# Patient Record
Sex: FEMALE | Race: BLACK OR AFRICAN AMERICAN | NOT HISPANIC OR LATINO | Employment: OTHER | URBAN - METROPOLITAN AREA
[De-identification: names, ages, dates, MRNs, and addresses within clinical notes are randomized per-mention and may not be internally consistent; named-entity substitution may affect disease eponyms.]

---

## 2017-03-05 DIAGNOSIS — E78.5 HYPERLIPIDEMIA LDL GOAL <100: ICD-10-CM

## 2017-03-06 NOTE — TELEPHONE ENCOUNTER
simvastatin (ZOCOR) 20 MG tablet 90 tablet 3 12/22/2016          Last Written Prescription Date: 12/22/2016  Last Fill Quantity: 90, # refills: 3  Last Office Visit with [facility], [facility] or [facility] prescribing provider: 12/22/2016       Lab Results   Component Value Date    CHOL 143 07/07/2016     Lab Results   Component Value Date    HDL 51 07/07/2016     Lab Results   Component Value Date    LDL 72 07/07/2016     Lab Results   Component Value Date    TRIG 100 07/07/2016     Lab Results   Component Value Date    CHOLHDLRATIO 2.7 09/22/2015

## 2017-03-07 RX ORDER — SIMVASTATIN 20 MG
TABLET ORAL
Qty: 90 TABLET | Refills: 1 | Status: SHIPPED | OUTPATIENT
Start: 2017-03-07 | End: 2017-11-16

## 2017-03-07 NOTE — TELEPHONE ENCOUNTER
Prescription approved per [facility] [place] [place] Refill Protocol.  Ira Quarles RN, BSN

## 2017-04-21 ENCOUNTER — TELEPHONE (OUTPATIENT)
Dept: FAMILY MEDICINE | Facility: CLINIC | Age: 77
End: 2017-04-21

## 2017-04-21 ENCOUNTER — OFFICE VISIT (OUTPATIENT)
Dept: FAMILY MEDICINE | Facility: CLINIC | Age: 77
End: 2017-04-21
Payer: COMMERCIAL

## 2017-04-21 VITALS
TEMPERATURE: 97.7 F | WEIGHT: 156 LBS | SYSTOLIC BLOOD PRESSURE: 140 MMHG | HEIGHT: 66 IN | DIASTOLIC BLOOD PRESSURE: 80 MMHG | BODY MASS INDEX: 25.07 KG/M2 | OXYGEN SATURATION: 99 % | HEART RATE: 90 BPM

## 2017-04-21 DIAGNOSIS — R07.9 LEFT SIDED CHEST PAIN: Primary | ICD-10-CM

## 2017-04-21 PROCEDURE — 93000 ELECTROCARDIOGRAM COMPLETE: CPT | Performed by: INTERNAL MEDICINE

## 2017-04-21 PROCEDURE — 99213 OFFICE O/P EST LOW 20 MIN: CPT | Performed by: INTERNAL MEDICINE

## 2017-04-21 NOTE — MR AVS SNAPSHOT
"              After Visit Summary   2017    Ashley Liu    MRN: [id number]           Patient Information     Date Of Birth          1940        Visit Information        Provider Department      2017 12:45 PM Julio César Peres MD [facility]        Today's Diagnoses     Left sided chest pain    -  1       Follow-ups after your visit        Who to contact     If you have questions or need follow up information about today's clinic visit or your schedule please contact [facility] directly at [phone].  Normal or non-critical lab and imaging results will be communicated to you by [employer], letter or phone within 4 business days after the clinic has received the results. If you do not hear from us within 7 days, please contact the clinic through [employer] or phone. If you have a critical or abnormal lab result, we will notify you by phone as soon as possible.  Submit refill requests through [employer] | mentor | invest or call your pharmacy and they will forward the refill request to us. Please allow 3 business days for your refill to be completed.          Additional Information About Your Visit        MyChart Information     [employer] lets you send messages to your doctor, view your test results, renew your prescriptions, schedule appointments and more. To sign up, go to [email or web address] | mentor | invest . Click on \"Log in\" on the left side of the screen, which will take you to the Welcome page. Then click on \"Sign up Now\" on the right side of the page.     You will be asked to enter the access code listed below, as well as some personal information. Please follow the directions to create your username and password.     Your access code is: [email or web address]  Expires: 2017  1:14 PM     Your access code will  in 90 days. If you need help or a new code, please call your [facility] or [phone].        Care EveryWhere ID     This is your Care EveryWhere ID. This could be used by other " "organizations to access your [facility] medical records  [id number]        Your Vitals Were     Pulse Temperature Height Pulse Oximetry Breastfeeding? BMI (Body Mass Index)    90 97.7  F (36.5  C) (Oral) 5' 6\" (1.676 m) 99% No 25.18 kg/m2       Blood Pressure from Last 3 Encounters:   04/21/17 140/80   12/22/16 138/68   07/07/16 132/74    Weight from Last 3 Encounters:   04/21/17 156 lb (70.8 kg)   12/22/16 158 lb (71.7 kg)   07/07/16 158 lb (71.7 kg)              We Performed the Following     EKG 12-lead complete w/read - Clinics        Primary Care Provider Office Phone # Fax #    Julio César Peres [phone]       [facility] [address]  [facility] [address]        Thank you!     Thank you for choosing [facility]  for your care. Our goal is always to provide you with excellent care. Hearing back from our patients is one way we can continue to improve our services. Please take a few minutes to complete the written survey that you may receive in the mail after your visit with us. Thank you!             Your Updated Medication List - Protect others around you: Learn how to safely use, store and throw away your medicines at www.disposemymeds.org.          This list is accurate as of: 4/21/17  1:14 PM.  Always use your most recent med list.                   Brand Name Dispense Instructions for use    ALPHAGAN P 0.1 % ophthalmic solution   Generic drug:  brimonidine      Place 1 drop into both eyes 2 times daily.       amLODIPine 5 MG tablet    NORVASC    90 tablet    Take 1 tablet (5 mg) by mouth daily       aspirin 325 MG EC tablet     100 tablet    Take 1 tablet by mouth daily.       blood glucose monitoring lancets     1 Box    1 each daily.       blood glucose monitoring test strip    ACCU-CHEK DEIDRA PLUS    50 each    USE ONE STRIP ONCE DAILY       colchicine 0.6 MG tablet    COLCRYS    20 tablet    Take 2 tablets at the first sign of flare, take 1 " additional tablet one hour later.       irbesartan 300 MG tablet    AVAPRO    90 tablet    Take 1 tablet (300 mg) by mouth At Bedtime       LORazepam 0.5 MG tablet    ATIVAN    30 tablet    Take 1 tablet by mouth daily as needed for anxiety.       MULTIPLE VITAMIN PO      Take  by mouth. 2 childrens multi vitamins daily.       simvastatin 20 MG tablet    ZOCOR    90 tablet    TAKE ONE TABLET BY MOUTH AT BEDTIME       spironolactone 25 MG tablet    ALDACTONE    90 tablet    Take 1 tablet (25 mg) by mouth daily       TRAVATAN 0.004 % ophthalmic solution   Generic drug:  travoprost Z (benzalkonium)      1 drop every evening.       vitamin D 2000 UNITS Caps      Take 2,000 Units by mouth.

## 2017-04-21 NOTE — TELEPHONE ENCOUNTER
Reason for call:  Patient reporting a symptom    Symptom or request: Pain in left breast    Duration (how long have symptoms been present): woke up with pain this morning    Have you been treated for this before? No    Additional comments: patient is wondering what she can do to relieve the pain and if she should be worried.    Phone Number patient can be reached at:  Home number on file [phone] (home)    Best Time:  Any time    Can we leave a detailed message on this number:  YES    Call taken on 4/21/2017 at 10:25 AM by Ly Pulido.

## 2017-04-21 NOTE — TELEPHONE ENCOUNTER
"Call to patient.  She states she has been having left breast pain since waking up this morning.  She notes the pain to go from her nipple to \"almost\" her axillary area on the left side.  She denies any nipple discharge, no apparent breast swelling.  She states pain is not severe, but \"enough to know it is there\" and it feels more like a dull ache.  She denies any other chest symptoms such as SOB, heaviness, or pain.  She denies N/V, headache or vision concerns, or clammy/sweaty skin.  There is no radiation of pain to any other areas.  Informed would check with PCP and call her back.  Advised if worsening symptoms to call 911.  Provider please review and advise.  Olimpia Tate RN       "

## 2017-04-21 NOTE — NURSING NOTE
"Chief Complaint   Patient presents with     Breast Problem       Initial /80  Pulse 90  Temp 97.7  F (36.5  C) (Oral)  Ht 5' 6\" (1.676 m)  Wt 156 lb (70.8 kg)  SpO2 99%  Breastfeeding? No  BMI 25.18 kg/m2 Estimated body mass index is 25.18 kg/(m^2) as calculated from the following:    Height as of this encounter: 5' 6\" (1.676 m).    Weight as of this encounter: 156 lb (70.8 kg).  Medication Reconciliation: complete   Carolann ROTH CMA      "

## 2017-04-21 NOTE — PROGRESS NOTES
Ashley Liu is a 76 year old female who presents for discomfort left breast area.  Started when woke this am, not before.  Nothing makes it better or worse.  No f,c,s.  NO chest pain or shortness of breath, no rash.  NO gi c/o and otherwise feels fine.  Has not had this before, right side fine.  NO lifting or trauma.    Past Medical History:   Diagnosis Date     Anxiety     rare ativan use     Atypical chest pain 2013    neg est echo     CKD (chronic kidney disease) stage 3, GFR 30-59 ml/min      Dizziness 2014    neg est echo     Glaucoma      Gout attack 6/29/15    elevated uric acid     HTN (hypertension)     stopped hctz 2015 due to gout     Hx of colonoscopy 2008    tics     Hypercholesteremia     added rx 12/12, aches with zocor, then added lipitor 2015     OA (osteoarthritis)      Peripheral neuropathy (H)      Stroke (H) 2004    intimal dissection     Type II or unspecified type diabetes mellitus without mention of complication, not stated as uncontrolled     diet controlled     Vertigo 2004    hosp fsd, mri pos     Vitamin D deficiency 2013     Past Surgical History:   Procedure Laterality Date     ECTOPIC PREGNANCY SURGERY  80's     HYSTERECTOMY, PAP NO LONGER INDICATED  1980    fibriods and endometriosis, maggy and bso     Social History     Social History     Marital status:      Spouse name: N/A     Number of children: 2     Years of education: N/A     Occupational History     retired, worked for [employer]      Social History Main Topics     Smoking status: Never Smoker     Smokeless tobacco: Never Used     Alcohol use No     Drug use: No     Sexual activity: Not Currently     Other Topics Concern     Not on file     Social History Narrative     Current Outpatient Prescriptions   Medication Sig Dispense Refill     simvastatin (ZOCOR) 20 MG tablet TAKE ONE TABLET BY MOUTH AT BEDTIME 90 tablet 1     irbesartan (AVAPRO) 300 MG tablet Take 1 tablet (300 mg) by mouth At Bedtime 90 tablet 3      "spironolactone (ALDACTONE) 25 MG tablet Take 1 tablet (25 mg) by mouth daily 90 tablet 3     amLODIPine (NORVASC) 5 MG tablet Take 1 tablet (5 mg) by mouth daily 90 tablet 3     blood glucose monitoring (ACCU-CHEK DEIDRA PLUS) test strip USE ONE STRIP ONCE DAILY 50 each 3     Cholecalciferol (VITAMIN D) 2000 UNITS CAPS Take 2,000 Units by mouth.       ACCU-CHEK FASTCLIX LANCETS MISC 1 each daily. 1 Box 3     aspirin 325 MG EC tablet Take 1 tablet by mouth daily. 100 tablet 3     MULTIPLE VITAMIN PO Take  by mouth. 2 childrens multi vitamins daily.        travoprost Z, benzalkonium, (TRAVATAN) 0.004 % ophthalmic solution 1 drop every evening.       brimonidine (ALPHAGAN P) 0.1 % ophthalmic solution Place 1 drop into both eyes 2 times daily.       colchicine (COLCRYS) 0.6 MG tablet Take 2 tablets at the first sign of flare, take 1 additional tablet one hour later. (Patient not taking: Reported on 4/21/2017) 20 tablet 0     LORazepam (ATIVAN) 0.5 MG tablet Take 1 tablet by mouth daily as needed for anxiety. (Patient not taking: Reported on 4/21/2017) 30 tablet 0     No Known Allergies  FAMILY HISTORY NOTED AND REVIEWED    REVIEW OF SYSTEMS: above    PHYSICAL EXAM    /80  Pulse 90  Temp 97.7  F (36.5  C) (Oral)  Ht 5' 6\" (1.676 m)  Wt 156 lb (70.8 kg)  SpO2 99%  Breastfeeding? No  BMI 25.18 kg/m2    Patient appears non toxic  Lungs clear  cv reglar rate and rhythm, nomrg, no jvp  Abdomen non-tender, no mgr, no hepatosplenomegaly  Left chest wall not red, no lesions, nipple within normal limits, breast within normal limits on left, does have area of tenderness, no masses, no nipple dc, no ax adenopathy.    ekg - nsr, within normal limits.    ASSESSMENT:  Chest wall pain, most c/w msk, doubt cv ischemia, ptc, pulmonary embolis, infection    PLAN:  If rash call for treatment  Use heat, advil  If worsens, shortness of breath, fever call if not gone by Monday call    Julio César Peres M.D.        "

## 2017-06-11 DIAGNOSIS — E11.9 TYPE 2 DIABETES MELLITUS WITHOUT COMPLICATIONS (H): ICD-10-CM

## 2017-06-12 NOTE — TELEPHONE ENCOUNTER
ACCU-CHEK DEIDRA PLUS test strip        Last Written Prescription Date: 7/7/2016  Last Fill Quantity: 50,  # refills: 3   Last Office Visit with [facility], [facility] or [facility] prescribing provider: 4/21/2017

## 2017-06-13 RX ORDER — BLOOD SUGAR DIAGNOSTIC
STRIP MISCELLANEOUS
Qty: 50 STRIP | Refills: 6 | Status: SHIPPED | OUTPATIENT
Start: 2017-06-13 | End: 2018-01-20

## 2017-06-13 NOTE — TELEPHONE ENCOUNTER
Prescription approved per [facility] Refill Protocol.  Jojo Aguilar RN

## 2017-10-31 ENCOUNTER — TELEPHONE (OUTPATIENT)
Dept: FAMILY MEDICINE | Facility: CLINIC | Age: 77
End: 2017-10-31

## 2017-10-31 NOTE — TELEPHONE ENCOUNTER
Reason for Call:   appointment    Detailed comments: patient wants to know if  can squeeze her in  She will be available between Nov.14th-21st and she prefers to see     Phone Number Patient can be reached at: [phone]    Best Time: anytime    Can we leave a detailed message on this number? YES    Call taken on 10/31/2017 at 9:28 AM by Saman Tello

## 2017-11-16 ENCOUNTER — OFFICE VISIT (OUTPATIENT)
Dept: FAMILY MEDICINE | Facility: CLINIC | Age: 77
End: 2017-11-16
Payer: COMMERCIAL

## 2017-11-16 ENCOUNTER — TRANSFERRED RECORDS (OUTPATIENT)
Dept: HEALTH INFORMATION MANAGEMENT | Facility: CLINIC | Age: 77
End: 2017-11-16

## 2017-11-16 VITALS
WEIGHT: 149 LBS | DIASTOLIC BLOOD PRESSURE: 71 MMHG | OXYGEN SATURATION: 99 % | TEMPERATURE: 96.7 F | HEIGHT: 66 IN | BODY MASS INDEX: 23.95 KG/M2 | HEART RATE: 70 BPM | SYSTOLIC BLOOD PRESSURE: 134 MMHG

## 2017-11-16 DIAGNOSIS — E11.9 TYPE 2 DIABETES MELLITUS WITHOUT COMPLICATION, WITHOUT LONG-TERM CURRENT USE OF INSULIN (H): Primary | ICD-10-CM

## 2017-11-16 DIAGNOSIS — I63.9 CEREBROVASCULAR ACCIDENT (CVA), UNSPECIFIED MECHANISM (H): ICD-10-CM

## 2017-11-16 DIAGNOSIS — G62.9 PERIPHERAL POLYNEUROPATHY: ICD-10-CM

## 2017-11-16 DIAGNOSIS — E55.9 VITAMIN D DEFICIENCY: ICD-10-CM

## 2017-11-16 DIAGNOSIS — I10 ESSENTIAL HYPERTENSION, BENIGN: ICD-10-CM

## 2017-11-16 DIAGNOSIS — N18.30 CKD (CHRONIC KIDNEY DISEASE) STAGE 3, GFR 30-59 ML/MIN (H): ICD-10-CM

## 2017-11-16 DIAGNOSIS — F41.9 ANXIETY: ICD-10-CM

## 2017-11-16 DIAGNOSIS — E78.5 HYPERLIPIDEMIA LDL GOAL <100: ICD-10-CM

## 2017-11-16 DIAGNOSIS — M62.830 BACK MUSCLE SPASM: ICD-10-CM

## 2017-11-16 LAB
ANION GAP SERPL CALCULATED.3IONS-SCNC: 6 MMOL/L (ref 3–14)
BUN SERPL-MCNC: 16 MG/DL (ref 7–30)
CALCIUM SERPL-MCNC: 9.6 MG/DL (ref 8.5–10.1)
CHLORIDE SERPL-SCNC: 109 MMOL/L (ref 94–109)
CHOLEST SERPL-MCNC: 127 MG/DL
CO2 SERPL-SCNC: 26 MMOL/L (ref 20–32)
CREAT SERPL-MCNC: 1.12 MG/DL (ref 0.52–1.04)
CREAT UR-MCNC: 155 MG/DL
ERYTHROCYTE [DISTWIDTH] IN BLOOD BY AUTOMATED COUNT: 14 % (ref 10–15)
GFR SERPL CREATININE-BSD FRML MDRD: 47 ML/MIN/1.7M2
GLUCOSE SERPL-MCNC: 122 MG/DL (ref 70–99)
HBA1C MFR BLD: 6.6 % (ref 4.3–6)
HCT VFR BLD AUTO: 39 % (ref 35–47)
HDLC SERPL-MCNC: 57 MG/DL
HGB BLD-MCNC: 12.9 G/DL (ref 11.7–15.7)
LDLC SERPL CALC-MCNC: 58 MG/DL
MCH RBC QN AUTO: 31.2 PG (ref 26.5–33)
MCHC RBC AUTO-ENTMCNC: 33.1 G/DL (ref 31.5–36.5)
MCV RBC AUTO: 94 FL (ref 78–100)
MICROALBUMIN UR-MCNC: 6 MG/L
MICROALBUMIN/CREAT UR: 4.14 MG/G CR (ref 0–25)
NONHDLC SERPL-MCNC: 70 MG/DL
PLATELET # BLD AUTO: 299 10E9/L (ref 150–450)
POTASSIUM SERPL-SCNC: 4.7 MMOL/L (ref 3.4–5.3)
RBC # BLD AUTO: 4.14 10E12/L (ref 3.8–5.2)
SODIUM SERPL-SCNC: 141 MMOL/L (ref 133–144)
TRIGL SERPL-MCNC: 60 MG/DL
WBC # BLD AUTO: 5.4 10E9/L (ref 4–11)

## 2017-11-16 PROCEDURE — 82043 UR ALBUMIN QUANTITATIVE: CPT | Performed by: INTERNAL MEDICINE

## 2017-11-16 PROCEDURE — 99214 OFFICE O/P EST MOD 30 MIN: CPT | Mod: 25 | Performed by: INTERNAL MEDICINE

## 2017-11-16 PROCEDURE — 80048 BASIC METABOLIC PNL TOTAL CA: CPT | Performed by: INTERNAL MEDICINE

## 2017-11-16 PROCEDURE — 99207 C FOOT EXAM  NO CHARGE: CPT | Performed by: INTERNAL MEDICINE

## 2017-11-16 PROCEDURE — 85027 COMPLETE CBC AUTOMATED: CPT | Performed by: INTERNAL MEDICINE

## 2017-11-16 PROCEDURE — 83036 HEMOGLOBIN GLYCOSYLATED A1C: CPT | Performed by: INTERNAL MEDICINE

## 2017-11-16 PROCEDURE — 80061 LIPID PANEL: CPT | Performed by: INTERNAL MEDICINE

## 2017-11-16 PROCEDURE — 82306 VITAMIN D 25 HYDROXY: CPT | Performed by: INTERNAL MEDICINE

## 2017-11-16 PROCEDURE — 36415 COLL VENOUS BLD VENIPUNCTURE: CPT | Performed by: INTERNAL MEDICINE

## 2017-11-16 RX ORDER — SIMVASTATIN 20 MG
TABLET ORAL
Qty: 90 TABLET | Refills: 3 | Status: SHIPPED | OUTPATIENT
Start: 2017-11-16 | End: 2019-01-20

## 2017-11-16 RX ORDER — LORAZEPAM 0.5 MG/1
0.5 TABLET ORAL DAILY PRN
Qty: 30 TABLET | Refills: 0 | Status: SHIPPED | OUTPATIENT
Start: 2017-11-16 | End: 2019-09-16

## 2017-11-16 RX ORDER — IRBESARTAN 300 MG/1
300 TABLET ORAL AT BEDTIME
Qty: 90 TABLET | Refills: 3 | Status: SHIPPED | OUTPATIENT
Start: 2017-11-16 | End: 2019-01-20

## 2017-11-16 RX ORDER — CYCLOBENZAPRINE HCL 10 MG
10 TABLET ORAL 3 TIMES DAILY PRN
Qty: 30 TABLET | Refills: 0 | Status: ON HOLD | OUTPATIENT
Start: 2017-11-16 | End: 2018-08-23

## 2017-11-16 RX ORDER — SPIRONOLACTONE 25 MG/1
25 TABLET ORAL DAILY
Qty: 90 TABLET | Refills: 3 | Status: SHIPPED | OUTPATIENT
Start: 2017-11-16 | End: 2019-01-20

## 2017-11-16 RX ORDER — AMLODIPINE BESYLATE 5 MG/1
5 TABLET ORAL DAILY
Qty: 90 TABLET | Refills: 3 | Status: SHIPPED | OUTPATIENT
Start: 2017-11-16 | End: 2019-01-20

## 2017-11-16 NOTE — NURSING NOTE
"Chief Complaint   Patient presents with     RECHECK     Hypertension     Diabetes     Lipids       Initial /71 (BP Location: Left arm, Cuff Size: Adult Regular)  Pulse 70  Temp 96.7  F (35.9  C) (Tympanic)  Ht 5' 6\" (1.676 m)  Wt 149 lb (67.6 kg)  SpO2 99%  BMI 24.05 kg/m2 Estimated body mass index is 24.05 kg/(m^2) as calculated from the following:    Height as of this encounter: 5' 6\" (1.676 m).    Weight as of this encounter: 149 lb (67.6 kg).  Medication Reconciliation: complete     Sharon Arnold MA    "

## 2017-11-16 NOTE — PROGRESS NOTES
Ashley Liu is a 76 year old female who presents for follow up mmp    1. Follow up diabetes, not on meds for it, up to date eye exam and no sores on feet or toes, no t/n.  Checks sugars daily and on avg 120 or so    2. Follow up cvi, as noted had prior and on med mgmt and no issues    3. Follow up ckd, to get labs today    4. Follow up peripheral neurop, minimal symptoms    5. Follow up vit d defic, to get labs today    6. Elevated cholesterol, taking statin and no diff    7. Follow up hypertension, blood pressure fine as noted.    8. Occasionally has back spasms, off and on, not often or new, wants muscle relaxant to use prn    She has had stress dealing with son in [place] who had cvi.  Is working out.  No chest pain or shortness of breath, no gi or genitourinary symptoms    Past Medical History:   Diagnosis Date     Anxiety     rare ativan use     Atypical chest pain 2013    neg est echo     CKD (chronic kidney disease) stage 3, GFR 30-59 ml/min      Dizziness 2014    neg est echo     Glaucoma      Gout attack 6/29/15    elevated uric acid     HTN (hypertension)     stopped hctz 2015 due to gout     Hx of colonoscopy 2008    tics     Hypercholesteremia     added rx 12/12, aches with zocor, then added lipitor 2015     OA (osteoarthritis)      Peripheral neuropathy      Stroke (H) 2004    intimal dissection     Type II or unspecified type diabetes mellitus without mention of complication, not stated as uncontrolled     diet controlled     Vertigo 2004    hosp fsd, mri pos     Vitamin D deficiency 2013     Past Surgical History:   Procedure Laterality Date     ECTOPIC PREGNANCY SURGERY  80's     HYSTERECTOMY, PAP NO LONGER INDICATED  1980    fibriods and endometriosis, maggy and bso     Social History     Social History     Marital status:      Spouse name: N/A     Number of children: 2     Years of education: N/A     Occupational History     retired, worked for [employer]      Social History Main Topics      Smoking status: Never Smoker     Smokeless tobacco: Never Used     Alcohol use No     Drug use: No     Sexual activity: Not Currently     Other Topics Concern     Not on file     Social History Narrative     Current Outpatient Prescriptions   Medication Sig Dispense Refill     simvastatin (ZOCOR) 20 MG tablet TAKE ONE TABLET BY MOUTH AT BEDTIME 90 tablet 3     irbesartan (AVAPRO) 300 MG tablet Take 1 tablet (300 mg) by mouth At Bedtime 90 tablet 3     spironolactone (ALDACTONE) 25 MG tablet Take 1 tablet (25 mg) by mouth daily 90 tablet 3     amLODIPine (NORVASC) 5 MG tablet Take 1 tablet (5 mg) by mouth daily 90 tablet 3     LORazepam (ATIVAN) 0.5 MG tablet Take 1 tablet (0.5 mg) by mouth daily as needed for anxiety 30 tablet 0     cyclobenzaprine (FLEXERIL) 10 MG tablet Take 1 tablet (10 mg) by mouth 3 times daily as needed for muscle spasms 30 tablet 0     ACCU-CHEK DEIDRA PLUS test strip USE ONE STRIP TO TEST DAILY 50 strip 6     Cholecalciferol (VITAMIN D) 2000 UNITS CAPS Take 2,000 Units by mouth.       ACCU-CHEK FASTCLIX LANCETS MISC 1 each daily. 1 Box 3     aspirin 325 MG EC tablet Take 1 tablet by mouth daily. 100 tablet 3     MULTIPLE VITAMIN PO Take  by mouth. 2 childrens multi vitamins daily.        travoprost Z, benzalkonium, (TRAVATAN) 0.004 % ophthalmic solution 1 drop every evening.       brimonidine (ALPHAGAN P) 0.1 % ophthalmic solution Place 1 drop into both eyes 2 times daily.       [DISCONTINUED] simvastatin (ZOCOR) 20 MG tablet TAKE ONE TABLET BY MOUTH AT BEDTIME 90 tablet 1     [DISCONTINUED] irbesartan (AVAPRO) 300 MG tablet Take 1 tablet (300 mg) by mouth At Bedtime 90 tablet 3     [DISCONTINUED] spironolactone (ALDACTONE) 25 MG tablet Take 1 tablet (25 mg) by mouth daily 90 tablet 3     [DISCONTINUED] amLODIPine (NORVASC) 5 MG tablet Take 1 tablet (5 mg) by mouth daily 90 tablet 3     colchicine (COLCRYS) 0.6 MG tablet Take 2 tablets at the first sign of flare, take 1 additional tablet one  "hour later. (Patient not taking: Reported on 11/16/2017) 20 tablet 0     No Known Allergies  FAMILY HISTORY NOTED AND REVIEWED    REVIEW OF SYSTEMS: abovd    PHYSICAL EXAM    /71 (BP Location: Left arm, Cuff Size: Adult Regular)  Pulse 70  Temp 96.7  F (35.9  C) (Tympanic)  Ht 5' 6\" (1.676 m)  Wt 149 lb (67.6 kg)  SpO2 99%  BMI 24.05 kg/m2    Patient appears non toxic  Mouth - tongue midline and within normal limits, mucous membranes and posterior pharynx within normal limits, no lesions seen.  Neck - no masses, lesions or tenderness  Nodes - no supraclavicular, cervical or axially adenopathy .  Lungs - clear, normal flow  Cardiovascular - regular rate and rhythm, no murmer, rub or gallop, no jvp or edema, carotids within normal limits, no bruits.  Abdomen - normal active bowel sounds, soft, non tender, no masses, guarding or rebound, no hepatosplenomegaly  Feet within normal limits, normal pulses, no sores    Labs sent    ASSESSMENT:  1. Diabetes, no c/o, follow up labs, to get eye exam, feet fine  2. Hypertension, controlled  3. Elevated cholesterol, on statin  4. Ckd, follow up labs, blood pressure fine  5. Vit d defic, follow up labs  6. Cvi, no issues  7. Anxiety, using prn ativan and no signs abuse, rare use  8. Back spasms, doubt renal, gi    PLAN:  Flexeril prn  Exercise, diet  Did not want immunizations  Eye exam  Letter with labs      Julio César Peres M.D.                          "

## 2017-11-16 NOTE — PATIENT INSTRUCTIONS
I will send you a letter with your lab results.  If you do not get it within 2 weeks please call me.    Call if problems    Julio César Peres M.D.

## 2017-11-16 NOTE — LETTER
[facility]  [address] [place] [address]. [facility]  [address]  [place]  [address]  Tel: [phone]    November 17, 2017    Ashley Liu  [address] [facility] [address]        Dear Ms. Liu,    Your labs look super.  Your a1c or diabetes test is lower, your cholesterol is lower, your urine is clear, your Vitamin D level is normal and your blood count and chemistries are also fine.    I am happy to bring you this overall excellent report.    If you have any further questions or problems, please contact our office.      Sincerely,    Julio César Peres MD/ Brenda Martines CMA  Results for orders placed or performed in visit on 11/16/17   CBC with platelets   Result Value Ref Range    WBC 5.4 4.0 - 11.0 10e9/L    RBC Count 4.14 3.8 - 5.2 10e12/L    Hemoglobin 12.9 11.7 - 15.7 g/dL    Hematocrit 39.0 35.0 - 47.0 %    MCV 94 78 - 100 fl    MCH 31.2 26.5 - 33.0 pg    MCHC 33.1 31.5 - 36.5 g/dL    RDW 14.0 10.0 - 15.0 %    Platelet Count 299 150 - 450 10e9/L   Basic metabolic panel   Result Value Ref Range    Sodium 141 133 - 144 mmol/L    Potassium 4.7 3.4 - 5.3 mmol/L    Chloride 109 94 - 109 mmol/L    Carbon Dioxide 26 20 - 32 mmol/L    Anion Gap 6 3 - 14 mmol/L    Glucose 122 (H) 70 - 99 mg/dL    Urea Nitrogen 16 7 - 30 mg/dL    Creatinine 1.12 (H) 0.52 - 1.04 mg/dL    GFR Estimate 47 (L) >60 mL/min/1.7m2    GFR Estimate If Black 57 (L) >60 mL/min/1.7m2    Calcium 9.6 8.5 - 10.1 mg/dL   Albumin Random Urine Quantitative with Creat Ratio   Result Value Ref Range    Creatinine Urine 155 mg/dL    Albumin Urine mg/L 6 mg/L    Albumin Urine mg/g Cr 4.14 0 - 25 mg/g Cr   Hemoglobin A1c   Result Value Ref Range    Hemoglobin A1C 6.6 (H) 4.3 - 6.0 %   Lipid panel reflex to direct LDL Non-fasting   Result Value Ref Range    Cholesterol 127 <200 mg/dL    Triglycerides 60 <150 mg/dL    HDL Cholesterol 57 >49 mg/dL    LDL Cholesterol Calculated 58 <100 mg/dL    Non HDL Cholesterol 70 <130 mg/dL   Vitamin D Deficiency    Result Value Ref Range    Vitamin D Deficiency screening 44 20 - 75 ug/L               Enclosure: Lab Results

## 2017-11-16 NOTE — MR AVS SNAPSHOT
"              After Visit Summary   11/16/2017    Ashley Liu    MRN: [id number]           Patient Information     Date Of Birth          1940        Visit Information        Provider Department      11/16/2017 9:45 AM Julio César Peres MD [facility]        Today's Diagnoses     Type 2 diabetes mellitus without complication, without long-term current use of insulin (H)    -  1    Cerebrovascular accident (CVA), unspecified mechanism (H)        CKD (chronic kidney disease) stage 3, GFR 30-59 ml/min        Peripheral polyneuropathy        Vitamin D deficiency        Hyperlipidemia LDL goal <100        Essential hypertension, benign        Anxiety        Back muscle spasm          Care Instructions    I will send you a letter with your lab results.  If you do not get it within 2 weeks please call me.    Call if problems    Julio César Peres M.D.            Follow-ups after your visit        Who to contact     If you have questions or need follow up information about today's clinic visit or your schedule please contact [facility] directly at [phone].  Normal or non-critical lab and imaging results will be communicated to you by MyChart, letter or phone within 4 business days after the clinic has received the results. If you do not hear from us within 7 days, please contact the clinic through Sunnyloftt or phone. If you have a critical or abnormal lab result, we will notify you by phone as soon as possible.  Submit refill requests through Intergloss or call your pharmacy and they will forward the refill request to us. Please allow 3 business days for your refill to be completed.          Additional Information About Your Visit        WagonharAriisto Information     Intergloss lets you send messages to your doctor, view your test results, renew your prescriptions, schedule appointments and more. To sign up, go to [email or web address] . Click on \"Log in\" on the left side of the screen, which will " "take you to the Welcome page. Then click on \"Sign up Now\" on the right side of the page.     You will be asked to enter the access code listed below, as well as some personal information. Please follow the directions to create your username and password.     Your access code is: [email or web address]  Expires: 2018  9:57 AM     Your access code will  in 90 days. If you need help or a new code, please call your [place] clinic or [phone].        Care EveryWhere ID     This is your Care EveryWhere ID. This could be used by other organizations to access your [place] medical records  [id number]        Your Vitals Were     Pulse Temperature Height Pulse Oximetry BMI (Body Mass Index)       70 96.7  F (35.9  C) (Tympanic) 5' 6\" (1.676 m) 99% 24.05 kg/m2        Blood Pressure from Last 3 Encounters:   17 134/71   17 140/80   16 138/68    Weight from Last 3 Encounters:   17 149 lb (67.6 kg)   17 156 lb (70.8 kg)   16 158 lb (71.7 kg)              We Performed the Following     Albumin Random Urine Quantitative with Creat Ratio     Basic metabolic panel     C FOOT EXAM  NO CHARGE     CBC with platelets     Hemoglobin A1c     Lipid panel reflex to direct LDL Non-fasting     Vitamin D Deficiency          Today's Medication Changes          These changes are accurate as of: 17 11:59 PM.  If you have any questions, ask your nurse or doctor.               Start taking these medicines.        Dose/Directions    cyclobenzaprine 10 MG tablet   Commonly known as:  FLEXERIL   Used for:  Back muscle spasm   Started by:  Julio César Peres MD        Dose:  10 mg   Take 1 tablet (10 mg) by mouth 3 times daily as needed for muscle spasms   Quantity:  30 tablet   Refills:  0         These medicines have changed or have updated prescriptions.        Dose/Directions    simvastatin 20 MG tablet   Commonly known as:  ZOCOR   This may have changed:  See the new instructions.   Used for:  " Hyperlipidemia LDL goal <100   Changed by:  Julio César Peres MD        TAKE ONE TABLET BY MOUTH AT BEDTIME   Quantity:  90 tablet   Refills:  3            Where to get your medicines      These medications were sent to [place] [address] [place] [facility] [place] [address]  [address], [facility] [address]     Phone:  [phone]     amLODIPine 5 MG tablet    cyclobenzaprine 10 MG tablet    irbesartan 300 MG tablet    simvastatin 20 MG tablet    spironolactone 25 MG tablet         Some of these will need a paper prescription and others can be bought over the counter.  Ask your nurse if you have questions.     Bring a paper prescription for each of these medications     LORazepam 0.5 MG tablet                Primary Care Provider Office Phone # Fax #    Julio César Peres [phone] [address] [address]        Equal Access to Services     [facility]: Hadii aad ku hadasho Soomaali, waaxda luqadaha, qaybta kaalmada adeegyada, waxay tainain hayaan kermit kim . So [facility] [phone].    ATENCIÓN: Si habla español, tiene a xiong disposición servicios gratuitos de asistencia lingüística. Bekah al [phone].    We comply with applicable federal civil rights laws and [place] laws. We do not discriminate on the basis of race, color, national origin, age, disability, sex, sexual orientation, or gender identity.            Thank you!     Thank you for choosing [facility]  for your care. Our goal is always to provide you with excellent care. Hearing back from our patients is one way we can continue to improve our services. Please take a few minutes to complete the written survey that you may receive in the mail after your visit with us. Thank you!             Your Updated Medication List - Protect others around you: Learn how to safely use, store and throw away your medicines at www.disposemymeds.org.          This list is accurate as of:  11/16/17 11:59 PM.  Always use your most recent med list.                   Brand Name Dispense Instructions for use Diagnosis    ACCU-CHEK DEIDRA PLUS test strip   Generic drug:  blood glucose monitoring     50 strip    USE ONE STRIP TO TEST DAILY    Type 2 diabetes mellitus without complications (H)       ALPHAGAN P 0.1 % ophthalmic solution   Generic drug:  brimonidine      Place 1 drop into both eyes 2 times daily.        amLODIPine 5 MG tablet    NORVASC    90 tablet    Take 1 tablet (5 mg) by mouth daily    Essential hypertension, benign       aspirin 325 MG EC tablet     100 tablet    Take 1 tablet by mouth daily.        blood glucose monitoring lancets     1 Box    1 each daily.    Type 2 diabetes, HbA1c goal < 7% (H)       colchicine 0.6 MG tablet    COLCRYS    20 tablet    Take 2 tablets at the first sign of flare, take 1 additional tablet one hour later.    Gout attack       cyclobenzaprine 10 MG tablet    FLEXERIL    30 tablet    Take 1 tablet (10 mg) by mouth 3 times daily as needed for muscle spasms    Back muscle spasm       irbesartan 300 MG tablet    AVAPRO    90 tablet    Take 1 tablet (300 mg) by mouth At Bedtime    Essential hypertension, benign       LORazepam 0.5 MG tablet    ATIVAN    30 tablet    Take 1 tablet (0.5 mg) by mouth daily as needed for anxiety    Anxiety       MULTIPLE VITAMIN PO      Take  by mouth. 2 childrens multi vitamins daily.        simvastatin 20 MG tablet    ZOCOR    90 tablet    TAKE ONE TABLET BY MOUTH AT BEDTIME    Hyperlipidemia LDL goal <100       spironolactone 25 MG tablet    ALDACTONE    90 tablet    Take 1 tablet (25 mg) by mouth daily    Essential hypertension, benign       TRAVATAN 0.004 % ophthalmic solution   Generic drug:  travoprost Z (benzalkonium)      1 drop every evening.        vitamin D 2000 UNITS Caps      Take 2,000 Units by mouth.

## 2017-11-17 LAB — DEPRECATED CALCIDIOL+CALCIFEROL SERPL-MC: 44 UG/L (ref 20–75)

## 2017-11-17 NOTE — PROGRESS NOTES
It was a pleasure seeing you.  I wanted to get back to you with your test results.  I have enclosed a copy for your records.    Your labs look super.  Your a1c or diabetes test is lower, your cholesterol is lower, your urine is clear, your Vitamin D level is normal and your blood count and chemistries are also fine.    I am happy to bring you this overall excellent report.  If you have any questions please call me.

## 2017-11-20 ENCOUNTER — TELEPHONE (OUTPATIENT)
Dept: FAMILY MEDICINE | Facility: CLINIC | Age: 77
End: 2017-11-20

## 2017-11-20 NOTE — TELEPHONE ENCOUNTER
Reason for Call:  Other AVS Questions     Detailed comments: Pt was seen in clinic on 11/16 and was looking over AVS and has some questions regarding Cerebrovascular accident (CVA), and Peripheral polyneuropathy        Phone Number Patient can be reached at: Home number on file [phone] (home)    Best Time: Afternoon     Can we leave a detailed message on this number? YES    Call taken on 11/20/2017 at 9:37 AM by Tri Maddox

## 2017-11-20 NOTE — TELEPHONE ENCOUNTER
Spoke to Ashley and was able to answer the questions she had regarding diagnosis hx. She just wanted to know if there was a new issue she had not been aware of. No other questions.

## 2018-01-20 DIAGNOSIS — E11.9 TYPE 2 DIABETES MELLITUS WITHOUT COMPLICATIONS (H): ICD-10-CM

## 2018-01-20 NOTE — TELEPHONE ENCOUNTER
Last Written Prescription Date:  6/13/17  Last Fill Quantity: 50 strip,  # refills: 6   Last Office Visit with G, P or [facility] prescribing provider:  11/16/17 [facility]   Future Office Visit:       Requested Prescriptions   Pending Prescriptions Disp Refills     ACCU-CHEK DEIDRA PLUS test strip [Pharmacy Med Name: ACCU-CHEK DEIDRA PLUS TEST STRP] 50 strip 6     Sig: TEST ONCE DAILY    Diabetic Supplies Protocol Passed    1/20/2018  8:29 AM       Passed - Patient is 18 years of age or older       Passed - Patient has had appt within past 6 mos    IV to PO - Antibiotics     None

## 2018-01-23 RX ORDER — BLOOD SUGAR DIAGNOSTIC
STRIP MISCELLANEOUS
Qty: 50 STRIP | Refills: 6 | Status: SHIPPED | OUTPATIENT
Start: 2018-01-23 | End: 2019-01-25

## 2018-03-13 ENCOUNTER — OFFICE VISIT (OUTPATIENT)
Dept: FAMILY MEDICINE | Facility: CLINIC | Age: 78
End: 2018-03-13
Payer: COMMERCIAL

## 2018-03-13 ENCOUNTER — TELEPHONE (OUTPATIENT)
Dept: FAMILY MEDICINE | Facility: CLINIC | Age: 78
End: 2018-03-13

## 2018-03-13 ENCOUNTER — RADIANT APPOINTMENT (OUTPATIENT)
Dept: GENERAL RADIOLOGY | Facility: CLINIC | Age: 78
End: 2018-03-13
Attending: INTERNAL MEDICINE
Payer: COMMERCIAL

## 2018-03-13 VITALS
DIASTOLIC BLOOD PRESSURE: 70 MMHG | OXYGEN SATURATION: 100 % | HEART RATE: 88 BPM | TEMPERATURE: 97.5 F | WEIGHT: 147 LBS | BODY MASS INDEX: 23.63 KG/M2 | SYSTOLIC BLOOD PRESSURE: 115 MMHG | HEIGHT: 66 IN

## 2018-03-13 DIAGNOSIS — E11.9 TYPE 2 DIABETES MELLITUS WITHOUT COMPLICATION, WITHOUT LONG-TERM CURRENT USE OF INSULIN (H): ICD-10-CM

## 2018-03-13 DIAGNOSIS — R07.89 CHEST PRESSURE: Primary | ICD-10-CM

## 2018-03-13 DIAGNOSIS — R07.89 CHEST PRESSURE: ICD-10-CM

## 2018-03-13 LAB
ALBUMIN SERPL-MCNC: 4.1 G/DL (ref 3.4–5)
ALP SERPL-CCNC: 131 U/L (ref 40–150)
ALT SERPL W P-5'-P-CCNC: 17 U/L (ref 0–50)
AST SERPL W P-5'-P-CCNC: 12 U/L (ref 0–45)
BILIRUB DIRECT SERPL-MCNC: 0.1 MG/DL (ref 0–0.2)
BILIRUB SERPL-MCNC: 0.3 MG/DL (ref 0.2–1.3)
ERYTHROCYTE [DISTWIDTH] IN BLOOD BY AUTOMATED COUNT: 14.2 % (ref 10–15)
HBA1C MFR BLD: 6.3 % (ref 4.3–6)
HCT VFR BLD AUTO: 40.4 % (ref 35–47)
HGB BLD-MCNC: 13.1 G/DL (ref 11.7–15.7)
MCH RBC QN AUTO: 30.8 PG (ref 26.5–33)
MCHC RBC AUTO-ENTMCNC: 32.4 G/DL (ref 31.5–36.5)
MCV RBC AUTO: 95 FL (ref 78–100)
PLATELET # BLD AUTO: 292 10E9/L (ref 150–450)
PROT SERPL-MCNC: 8.1 G/DL (ref 6.8–8.8)
RBC # BLD AUTO: 4.26 10E12/L (ref 3.8–5.2)
TROPONIN I SERPL-MCNC: <0.015 UG/L (ref 0–0.04)
WBC # BLD AUTO: 4.5 10E9/L (ref 4–11)

## 2018-03-13 PROCEDURE — 36415 COLL VENOUS BLD VENIPUNCTURE: CPT | Performed by: INTERNAL MEDICINE

## 2018-03-13 PROCEDURE — 83036 HEMOGLOBIN GLYCOSYLATED A1C: CPT | Performed by: INTERNAL MEDICINE

## 2018-03-13 PROCEDURE — 85027 COMPLETE CBC AUTOMATED: CPT | Performed by: INTERNAL MEDICINE

## 2018-03-13 PROCEDURE — 71046 X-RAY EXAM CHEST 2 VIEWS: CPT

## 2018-03-13 PROCEDURE — 93005 ELECTROCARDIOGRAM TRACING: CPT | Performed by: INTERNAL MEDICINE

## 2018-03-13 PROCEDURE — 80076 HEPATIC FUNCTION PANEL: CPT | Performed by: INTERNAL MEDICINE

## 2018-03-13 PROCEDURE — 84484 ASSAY OF TROPONIN QUANT: CPT | Performed by: INTERNAL MEDICINE

## 2018-03-13 PROCEDURE — 99214 OFFICE O/P EST MOD 30 MIN: CPT | Performed by: INTERNAL MEDICINE

## 2018-03-13 NOTE — PROGRESS NOTES
The patient is here as an urgent work in for substernal chest pressure.  It started on Sunday and has been persistent ever since then.  It does not radiate.  She has had no coughs, fevers, shortness of breath, PND or edema.  She tried Pepto-Bismol and this may have helped a little bit.  She does not have acid reflux.  No dysphasia.  No bowel changes or bloody or black stools.  No history of blood clots.  She has never had this before.  Nothing makes it better or worse.  It is not severe.  She has multiple medical problems as noted below.  She otherwise feels fine.    Past Medical History:   Diagnosis Date     Anxiety     rare ativan use     Atypical chest pain 2013    neg est echo     CKD (chronic kidney disease) stage 3, GFR 30-59 ml/min      Dizziness 2014    neg est echo     Glaucoma      Gout attack 6/29/15    elevated uric acid     HTN (hypertension)     stopped hctz 2015 due to gout     Hx of colonoscopy 2008    tics     Hypercholesteremia     added rx 12/12, aches with zocor, then added lipitor 2015     OA (osteoarthritis)      Peripheral neuropathy      Stroke (H) 2004    intimal dissection     Type II or unspecified type diabetes mellitus without mention of complication, not stated as uncontrolled     diet controlled     Vertigo 2004    hosp fsd, mri pos     Vitamin D deficiency 2013     Past Surgical History:   Procedure Laterality Date     ECTOPIC PREGNANCY SURGERY  80's     HYSTERECTOMY, PAP NO LONGER INDICATED  1980    fibriods and endometriosis, maggy and bso     Social History     Social History     Marital status:      Spouse name: N/A     Number of children: 2     Years of education: N/A     Occupational History     retired, worked for [employer]      Social History Main Topics     Smoking status: Never Smoker     Smokeless tobacco: Never Used     Alcohol use No     Drug use: No     Sexual activity: Not Currently     Other Topics Concern     Not on file     Social History Narrative     Current  "Outpatient Prescriptions   Medication Sig Dispense Refill     ACCU-CHEK DEIDRA PLUS test strip TEST ONCE DAILY 50 strip 6     simvastatin (ZOCOR) 20 MG tablet TAKE ONE TABLET BY MOUTH AT BEDTIME 90 tablet 3     irbesartan (AVAPRO) 300 MG tablet Take 1 tablet (300 mg) by mouth At Bedtime 90 tablet 3     spironolactone (ALDACTONE) 25 MG tablet Take 1 tablet (25 mg) by mouth daily 90 tablet 3     amLODIPine (NORVASC) 5 MG tablet Take 1 tablet (5 mg) by mouth daily 90 tablet 3     LORazepam (ATIVAN) 0.5 MG tablet Take 1 tablet (0.5 mg) by mouth daily as needed for anxiety 30 tablet 0     cyclobenzaprine (FLEXERIL) 10 MG tablet Take 1 tablet (10 mg) by mouth 3 times daily as needed for muscle spasms 30 tablet 0     colchicine (COLCRYS) 0.6 MG tablet Take 2 tablets at the first sign of flare, take 1 additional tablet one hour later. 20 tablet 0     Cholecalciferol (VITAMIN D) 2000 UNITS CAPS Take 2,000 Units by mouth.       ACCU-CHEK FASTCLIX LANCETS MISC 1 each daily. 1 Box 3     aspirin 325 MG EC tablet Take 1 tablet by mouth daily. 100 tablet 3     MULTIPLE VITAMIN PO Take  by mouth. 2 childrens multi vitamins daily.        travoprost Z, benzalkonium, (TRAVATAN) 0.004 % ophthalmic solution 1 drop every evening.       brimonidine (ALPHAGAN P) 0.1 % ophthalmic solution Place 1 drop into both eyes 2 times daily.       No Known Allergies  FAMILY HISTORY NOTED AND REVIEWED    REVIEW OF SYSTEMS: above    PHYSICAL EXAM    /70 (BP Location: Right arm, Cuff Size: Adult Regular)  Pulse 88  Temp 97.5  F (36.4  C) (Tympanic)  Ht 5' 6\" (1.676 m)  Wt 147 lb (66.7 kg)  SpO2 100%  BMI 23.73 kg/m2    Patient appears non toxic  Mouth - tongue midline and within normal limits, mucous membranes and posterior pharynx within normal limits, no lesions seen.  Neck - no masses, lesions or tenderness  Nodes - no supraclavicular, cervical or axially adenopathy .  Lungs - clear, normal flow  Cardiovascular - regular rate and rhythm, no " murmer, rub or gallop, no jvp or edema, carotids within normal limits, no bruits.  Abdomen - normal active bowel sounds, soft, non tender, no masses, guarding or rebound, no hepatosplenomegaly  No chest wall rashes, slightly tender to pressure over the area    Labs stat labs, ekg and cxr to be done, patient will wait for the results.    ASSESSMENT:  Chest pressure, needs immediate evaluation.  Will get stat labs, ekg and cxr and then discussed with patient further    PLAN:  Above          Julio César Peres M.D.    Later, labs returned at 2 PM.  I discussed all the results with the patient and they are all completely normal.  Her A1c is also fine.    I suspect that this is likely musculoskeletal.  I doubt that this is cardiovascular, PE, or pneumothorax.  At this time the plan will be to use Advil 2 3 times daily with meals for the next 3-5 days.  However, if she gets increasing symptoms shortness of breath or fever she will call right away.  I have also recommended a follow-up stress test to be safe but she does not want it at this time.    Plan is as noted above.  If her symptoms are not constant she will let me know.    Julio César Peres M.D.

## 2018-03-13 NOTE — PATIENT INSTRUCTIONS
Take 2 advil three times daily with meals for the next 5 days.  If the pain worsens or you get shortness of breath or a fever call right away.    Call if the discomfort is not gone in the next few days    Julio César Peres M.D.

## 2018-03-13 NOTE — MR AVS SNAPSHOT
"              After Visit Summary   3/13/2018    Ashley Liu    MRN: [id number]           Patient Information     Date Of Birth          1940        Visit Information        Provider Department      3/13/2018 12:00 PM Julio César Peres MD [facility]        Today's Diagnoses     Chest pressure    -  1    Type 2 diabetes mellitus without complication, without long-term current use of insulin (H)          Care Instructions    Take 2 advil three times daily with meals for the next 5 days.  If the pain worsens or you get shortness of breath or a fever call right away.    Call if the discomfort is not gone in the next few days    Julio César Peres M.D.            Follow-ups after your visit        Who to contact     If you have questions or need follow up information about today's clinic visit or your schedule please contact [facility] directly at [phone].  Normal or non-critical lab and imaging results will be communicated to you by MyChart, letter or phone within 4 business days after the clinic has received the results. If you do not hear from us within 7 days, please contact the clinic through MyChart or phone. If you have a critical or abnormal lab result, we will notify you by phone as soon as possible.  Submit refill requests through MedaPhor or call your pharmacy and they will forward the refill request to us. Please allow 3 business days for your refill to be completed.          Additional Information About Your Visit        MyChart Information     MedaPhor lets you send messages to your doctor, view your test results, renew your prescriptions, schedule appointments and more. To sign up, go to [email or web address] . Click on \"Log in\" on the left side of the screen, which will take you to the Welcome page. Then click on \"Sign up Now\" on the right side of the page.     You will be asked to enter the access code listed below, as well as some personal information. Please " "follow the directions to create your username and password.     Your access code is: [email or web address]  Expires: 2018  1:58 PM     Your access code will  in 90 days. If you need help or a new code, please call your [place] clinic or [phone].        Care EveryWhere ID     This is your Care EveryWhere ID. This could be used by other organizations to access your [place] medical records  [id number]        Your Vitals Were     Pulse Temperature Height Pulse Oximetry BMI (Body Mass Index)       88 97.5  F (36.4  C) (Tympanic) 5' 6\" (1.676 m) 100% 23.73 kg/m2        Blood Pressure from Last 3 Encounters:   18 115/70   17 134/71   17 140/80    Weight from Last 3 Encounters:   18 147 lb (66.7 kg)   17 149 lb (67.6 kg)   17 156 lb (70.8 kg)              We Performed the Following     CBC with platelets     EKG 12-LEAD CLINIC READ     Hemoglobin A1c     Hepatic panel     Troponin I        Primary Care Provider Office Phone # Fax #    Julio César Peres [phone] [address] [facility] [address]  [facility] [address]        Equal Access to Services     [facility]: Hadii aad ku hadasho Soomaali, waaxda luqadaha, qaybta kaalmada adeegyada, waxay idiin hayaan aderachell kim . So [facility] [phone].    ATENCIÓN: Si habla español, tiene a xiong disposición servicios gratuitos de asistencia lingüística. Llame al [phone].    We comply with applicable federal civil rights laws and [place] laws. We do not discriminate on the basis of race, color, national origin, age, disability, sex, sexual orientation, or gender identity.            Thank you!     Thank you for choosing [facility]  for your care. Our goal is always to provide you with excellent care. Hearing back from our patients is one way we can continue to improve our services. Please take a few minutes to complete the written survey that you may receive in the mail after your visit with us. " Thank you!             Your Updated Medication List - Protect others around you: Learn how to safely use, store and throw away your medicines at www.disposemymeds.org.          This list is accurate as of 3/13/18  1:59 PM.  Always use your most recent med list.                   Brand Name Dispense Instructions for use Diagnosis    ACCU-CHEK DEIDRA PLUS test strip   Generic drug:  blood glucose monitoring     50 strip    TEST ONCE DAILY    Type 2 diabetes mellitus without complications (H)       ALPHAGAN P 0.1 % ophthalmic solution   Generic drug:  brimonidine      Place 1 drop into both eyes 2 times daily.        amLODIPine 5 MG tablet    NORVASC    90 tablet    Take 1 tablet (5 mg) by mouth daily    Essential hypertension, benign       aspirin 325 MG EC tablet     100 tablet    Take 1 tablet by mouth daily.        blood glucose monitoring lancets     1 Box    1 each daily.    Type 2 diabetes, HbA1c goal < 7% (H)       colchicine 0.6 MG tablet    COLCRYS    20 tablet    Take 2 tablets at the first sign of flare, take 1 additional tablet one hour later.    Gout attack       cyclobenzaprine 10 MG tablet    FLEXERIL    30 tablet    Take 1 tablet (10 mg) by mouth 3 times daily as needed for muscle spasms    Back muscle spasm       irbesartan 300 MG tablet    AVAPRO    90 tablet    Take 1 tablet (300 mg) by mouth At Bedtime    Essential hypertension, benign       LORazepam 0.5 MG tablet    ATIVAN    30 tablet    Take 1 tablet (0.5 mg) by mouth daily as needed for anxiety    Anxiety       MULTIPLE VITAMIN PO      Take  by mouth. 2 childrens multi vitamins daily.        simvastatin 20 MG tablet    ZOCOR    90 tablet    TAKE ONE TABLET BY MOUTH AT BEDTIME    Hyperlipidemia LDL goal <100       spironolactone 25 MG tablet    ALDACTONE    90 tablet    Take 1 tablet (25 mg) by mouth daily    Essential hypertension, benign       TRAVATAN 0.004 % ophthalmic solution   Generic drug:  travoprost Z (benzalkonium)      1 drop every  evening.        vitamin D 2000 UNITS Caps      Take 2,000 Units by mouth.

## 2018-03-13 NOTE — TELEPHONE ENCOUNTER
Returned patient's call:     Pt states this is a new symptoms x2 days. Started really noticing on Sunday, did not go away.     Took Peptobismal yesterday, relieved pressure some. Returned on Monday into today.     Constant, denies changes with positioning, deep breathing, eating   Denies radiation  SOB: Denies  Feels like she maybe isn't breathing as deeply   Wheezing: Denies   Nausea: Denies   Vision Changes: Denies     Offered [address]- Pt declined, as is unavailable until 12pm due to care needs for her son.     Will huddle with provider    Advised to call/seek care sooner with changes from pressure to pain, radiation, SOB, nausea    Pt agreed with plan,     Jazzmine GAINES RN

## 2018-03-13 NOTE — TELEPHONE ENCOUNTER
"Reason for call:  Patient reporting a symptom    Symptom or request: chest pressure    Duration (how long have symptoms been present): 2 days    Have you been treated for this before? Yes    Additional comments: pt has been complaining \"chest pressure\" but no pain just pressure. No other sx but is having little bit of the chest pressure sx at this time . Trans to [employer]     Phone Number patient can be reached at:  Home number on file [phone] (home)    Best Time:  any    Can we leave a detailed message on this number:  YES    Call taken on 3/13/2018 at 9:55 AM by Aubrey Cervantes    "

## 2018-03-13 NOTE — NURSING NOTE
"Chief Complaint   Patient presents with     Chest Pain     pt c/o pressure in chest since Sunday;  no arm pain, no SOB       Initial /70 (BP Location: Right arm, Cuff Size: Adult Regular)  Pulse 88  Temp 97.5  F (36.4  C) (Tympanic)  Ht 5' 6\" (1.676 m)  Wt 147 lb (66.7 kg)  SpO2 100%  BMI 23.73 kg/m2 Estimated body mass index is 23.73 kg/(m^2) as calculated from the following:    Height as of this encounter: 5' 6\" (1.676 m).    Weight as of this encounter: 147 lb (66.7 kg).  Medication Reconciliation: complete     Sharon Arnold MA    "

## 2018-03-13 NOTE — TELEPHONE ENCOUNTER
Huddled with provider:  Offered 12pm    Recalled patient: she will do her very best to be here by noon for appt with PCP.     Jazzmine GAINES RN

## 2018-07-17 ENCOUNTER — OFFICE VISIT (OUTPATIENT)
Dept: FAMILY MEDICINE | Facility: CLINIC | Age: 78
End: 2018-07-17
Payer: COMMERCIAL

## 2018-07-17 VITALS
OXYGEN SATURATION: 99 % | HEART RATE: 60 BPM | SYSTOLIC BLOOD PRESSURE: 138 MMHG | TEMPERATURE: 98.3 F | HEIGHT: 66 IN | WEIGHT: 147 LBS | BODY MASS INDEX: 23.63 KG/M2 | DIASTOLIC BLOOD PRESSURE: 66 MMHG

## 2018-07-17 DIAGNOSIS — N75.0 INFECTED CYST OF BARTHOLIN'S GLAND DUCT: ICD-10-CM

## 2018-07-17 DIAGNOSIS — N18.30 CKD (CHRONIC KIDNEY DISEASE) STAGE 3, GFR 30-59 ML/MIN (H): ICD-10-CM

## 2018-07-17 DIAGNOSIS — N18.30 TYPE 2 DIABETES MELLITUS WITH STAGE 3 CHRONIC KIDNEY DISEASE, WITHOUT LONG-TERM CURRENT USE OF INSULIN (H): ICD-10-CM

## 2018-07-17 DIAGNOSIS — I10 ESSENTIAL HYPERTENSION, BENIGN: ICD-10-CM

## 2018-07-17 DIAGNOSIS — Z12.11 SPECIAL SCREENING FOR MALIGNANT NEOPLASMS, COLON: ICD-10-CM

## 2018-07-17 DIAGNOSIS — E78.5 HYPERLIPIDEMIA LDL GOAL <100: Primary | ICD-10-CM

## 2018-07-17 DIAGNOSIS — G62.9 PERIPHERAL POLYNEUROPATHY: ICD-10-CM

## 2018-07-17 DIAGNOSIS — E11.22 TYPE 2 DIABETES MELLITUS WITH STAGE 3 CHRONIC KIDNEY DISEASE, WITHOUT LONG-TERM CURRENT USE OF INSULIN (H): ICD-10-CM

## 2018-07-17 LAB
ERYTHROCYTE [DISTWIDTH] IN BLOOD BY AUTOMATED COUNT: 13.8 % (ref 10–15)
HBA1C MFR BLD: 6.4 % (ref 0–5.6)
HCT VFR BLD AUTO: 39.9 % (ref 35–47)
HGB BLD-MCNC: 13.1 G/DL (ref 11.7–15.7)
MCH RBC QN AUTO: 30.5 PG (ref 26.5–33)
MCHC RBC AUTO-ENTMCNC: 32.8 G/DL (ref 31.5–36.5)
MCV RBC AUTO: 93 FL (ref 78–100)
PLATELET # BLD AUTO: 301 10E9/L (ref 150–450)
RBC # BLD AUTO: 4.29 10E12/L (ref 3.8–5.2)
WBC # BLD AUTO: 5.8 10E9/L (ref 4–11)

## 2018-07-17 PROCEDURE — 99207 C FOOT EXAM  NO CHARGE: CPT | Mod: 25 | Performed by: INTERNAL MEDICINE

## 2018-07-17 PROCEDURE — 84443 ASSAY THYROID STIM HORMONE: CPT | Performed by: INTERNAL MEDICINE

## 2018-07-17 PROCEDURE — 36415 COLL VENOUS BLD VENIPUNCTURE: CPT | Performed by: INTERNAL MEDICINE

## 2018-07-17 PROCEDURE — 80048 BASIC METABOLIC PNL TOTAL CA: CPT | Performed by: INTERNAL MEDICINE

## 2018-07-17 PROCEDURE — 99214 OFFICE O/P EST MOD 30 MIN: CPT | Performed by: INTERNAL MEDICINE

## 2018-07-17 PROCEDURE — 80061 LIPID PANEL: CPT | Performed by: INTERNAL MEDICINE

## 2018-07-17 PROCEDURE — 85027 COMPLETE CBC AUTOMATED: CPT | Performed by: INTERNAL MEDICINE

## 2018-07-17 PROCEDURE — 83036 HEMOGLOBIN GLYCOSYLATED A1C: CPT | Performed by: INTERNAL MEDICINE

## 2018-07-17 RX ORDER — CEPHALEXIN 500 MG/1
500 CAPSULE ORAL 3 TIMES DAILY
Qty: 21 CAPSULE | Refills: 0 | Status: SHIPPED | OUTPATIENT
Start: 2018-07-17 | End: 2018-10-26

## 2018-07-17 NOTE — PROGRESS NOTES
The patient is here for multiple issues.  The patient has had a lump near the right vaginal area for quite some time but in the last week or so has become painful and drained a little bit.  She does not feel ill and she has had no fevers.  No nausea or vomiting.    The patient has diabetes with stage III CKD.  She is not on medication for it.  Her feet are fine.  She is up-to-date in terms of her eye exam.  She checks her sugars regularly and they have been about 120.    Patient has hyperlipidemia for which she is on simvastatin.  She has hypertension and her blood pressure initially was high but the second 1 was fine.  She has peripheral neuropathy.  She is due for colonoscopy.    A complete review of systems is otherwise negative.    Past Medical History:   Diagnosis Date     Anxiety     rare ativan use     Atypical chest pain 2013    neg est echo     CKD (chronic kidney disease) stage 3, GFR 30-59 ml/min      Dizziness 2014    neg est echo     Glaucoma      Gout attack 6/29/15    elevated uric acid     HTN (hypertension)     stopped hctz 2015 due to gout     Hx of colonoscopy 2008    tics     Hypercholesteremia     added rx 12/12, aches with zocor, then added lipitor 2015     OA (osteoarthritis)      Peripheral neuropathy      Stroke (H) 2004    intimal dissection     Type II or unspecified type diabetes mellitus without mention of complication, not stated as uncontrolled     diet controlled     Vertigo 2004    hosp fsd, mri pos     Vitamin D deficiency 2013     Past Surgical History:   Procedure Laterality Date     ECTOPIC PREGNANCY SURGERY  80's     HYSTERECTOMY, PAP NO LONGER INDICATED  1980    fibriods and endometriosis, maggy and bso     Social History     Social History     Marital status:      Spouse name: N/A     Number of children: 2     Years of education: N/A     Occupational History     retired, worked for [employer]      Social History Main Topics     Smoking status: Never Smoker     Smokeless  "tobacco: Never Used     Alcohol use No     Drug use: No     Sexual activity: Not Currently     Other Topics Concern     Not on file     Social History Narrative     Current Outpatient Prescriptions   Medication Sig Dispense Refill     ACCU-CHEK FASTCLIX LANCETS MISC 1 each daily. 1 Box 3     amLODIPine (NORVASC) 5 MG tablet Take 1 tablet (5 mg) by mouth daily 90 tablet 3     aspirin 325 MG EC tablet Take 1 tablet by mouth daily. 100 tablet 3     brimonidine (ALPHAGAN P) 0.1 % ophthalmic solution Place 1 drop into both eyes 2 times daily.       Cholecalciferol (VITAMIN D) 2000 UNITS CAPS Take 2,000 Units by mouth.       cyclobenzaprine (FLEXERIL) 10 MG tablet Take 1 tablet (10 mg) by mouth 3 times daily as needed for muscle spasms 30 tablet 0     irbesartan (AVAPRO) 300 MG tablet Take 1 tablet (300 mg) by mouth At Bedtime 90 tablet 3     LORazepam (ATIVAN) 0.5 MG tablet Take 1 tablet (0.5 mg) by mouth daily as needed for anxiety 30 tablet 0     MULTIPLE VITAMIN PO Take  by mouth. 2 childrens multi vitamins daily.        simvastatin (ZOCOR) 20 MG tablet TAKE ONE TABLET BY MOUTH AT BEDTIME 90 tablet 3     spironolactone (ALDACTONE) 25 MG tablet Take 1 tablet (25 mg) by mouth daily 90 tablet 3     travoprost Z, benzalkonium, (TRAVATAN) 0.004 % ophthalmic solution 1 drop every evening.       ACCU-CHEK DEIDRA PLUS test strip TEST ONCE DAILY 50 strip 6     cephALEXin (KEFLEX) 500 MG capsule Take 1 capsule (500 mg) by mouth 3 times daily 21 capsule 0     colchicine (COLCRYS) 0.6 MG tablet Take 2 tablets at the first sign of flare, take 1 additional tablet one hour later. 20 tablet 0     No Known Allergies  FAMILY HISTORY NOTED AND REVIEWED    REVIEW OF SYSTEMS: above    PHYSICAL EXAM    /66  Pulse 60  Temp 98.3  F (36.8  C) (Oral)  Ht 5' 6\" (1.676 m)  Wt 147 lb (66.7 kg)  SpO2 99%  Breastfeeding? No  BMI 23.73 kg/m2    Patient appears non toxic  Mouth - tongue midline and within normal limits, mucous membranes " and posterior pharynx within normal limits, no lesions seen.  Neck - no masses, lesions or tenderness  Nodes - no supraclavicular, cervical or axially adenopathy .  Lungs - clear, normal flow  Cardiovascular - regular rate and rhythm, no murmer, rub or gallop, no jvp or edema, carotids within normal limits, no bruits.  Abdomen - normal active bowel sounds, soft, non tender, no masses, guarding or rebound, no hepatosplenomegaly  The patient has small, raised, slightly red and tender mass right groin area.  I was able to express fair amt of pus    Labs sent    ASSESSMENT:  1. Diabetes, ckd stage 3, not on meds, no other c/o  2. Ckd, follow up labs  3. Hypertension, controlled  4. Infected batholin's cyst  5. Elevated cholesterol on statin  6. hcm    PLAN:  Colonoscopy  Did not want immunizations  Keflex 500mg tid for 7 days, heat, call if worsens, not gone soon  Letter with labs      Julio César Peres M.D.

## 2018-07-17 NOTE — LETTER
[facility]  [address] [place], [place] [address]. [facility]  [address]  [place]  [address]  Tel: [phone]    July 18, 2018    Ashley Liu  [address] [place] [address]        Dear Ms. Liu,    It was a pleasure seeing you.  I wanted to get back to you with your test results.  I have enclosed a copy for your records.     Your labs look very good.  This includes your blood count, chemistries, thyroid test and cholesterol.  Your diabetes test or a1c is also very good.     If you have any questions please call me.     Sincerely,    Julio César Peres MD/emeter[facility]    Results for orders placed or performed in visit on 07/17/18   CBC with platelets   Result Value Ref Range    WBC 5.8 4.0 - 11.0 10e9/L    RBC Count 4.29 3.8 - 5.2 10e12/L    Hemoglobin 13.1 11.7 - 15.7 g/dL    Hematocrit 39.9 35.0 - 47.0 %    MCV 93 78 - 100 fl    MCH 30.5 26.5 - 33.0 pg    MCHC 32.8 31.5 - 36.5 g/dL    RDW 13.8 10.0 - 15.0 %    Platelet Count 301 150 - 450 10e9/L   Basic metabolic panel   Result Value Ref Range    Sodium 139 133 - 144 mmol/L    Potassium 4.1 3.4 - 5.3 mmol/L    Chloride 105 94 - 109 mmol/L    Carbon Dioxide 27 20 - 32 mmol/L    Anion Gap 7 3 - 14 mmol/L    Glucose 95 70 - 99 mg/dL    Urea Nitrogen 17 7 - 30 mg/dL    Creatinine 1.12 (H) 0.52 - 1.04 mg/dL    GFR Estimate 47 (L) >60 mL/min/1.7m2    GFR Estimate If Black 57 (L) >60 mL/min/1.7m2    Calcium 9.4 8.5 - 10.1 mg/dL   TSH with free T4 reflex   Result Value Ref Range    TSH 2.42 0.40 - 4.00 mU/L   Hemoglobin A1c   Result Value Ref Range    Hemoglobin A1C 6.4 (H) 0 - 5.6 %   Lipid panel reflex to direct LDL Non-fasting   Result Value Ref Range    Cholesterol 160 <200 mg/dL    Triglycerides 79 <150 mg/dL    HDL Cholesterol 61 >49 mg/dL    LDL Cholesterol Calculated 83 <100 mg/dL    Non HDL Cholesterol 99 <130 mg/dL           Enclosure: Lab Results

## 2018-07-17 NOTE — MR AVS SNAPSHOT
After Visit Summary   7/17/2018    Ashley Liu    MRN: [id number]           Patient Information     Date Of Birth          1940        Visit Information        Provider Department      7/17/2018 12:30 PM Julio César Peres MD [facility]        Today's Diagnoses     Hyperlipidemia LDL goal <100    -  1    Type 2 diabetes mellitus with stage 3 chronic kidney disease, without long-term current use of insulin (H)        CKD (chronic kidney disease) stage 3, GFR 30-59 ml/min        Essential hypertension, benign        Peripheral polyneuropathy        Infected cyst of Bartholin's gland duct        Special screening for malignant neoplasms, colon          Care Instructions    Start the antibiotics and use heat to the area 4x a day for 15 minutes.  If this does not clear up over the next week or worsens call me.    I will send you a letter with your lab results.  If you do not get it within 2 weeks please call me.    Julio César ePres M.D.            Follow-ups after your visit        Additional Services     GASTROENTEROLOGY ADULT REF PROCEDURE ONLY Misbah Braun [phone]; Dr. IRA Lea                 Who to contact     If you have questions or need follow up information about today's clinic visit or your schedule please contact [facility] directly at [phone].  Normal or non-critical lab and imaging results will be communicated to you by MyChart, letter or phone within 4 business days after the clinic has received the results. If you do not hear from us within 7 days, please contact the clinic through MyChart or phone. If you have a critical or abnormal lab result, we will notify you by phone as soon as possible.  Submit refill requests through [employer] or call your pharmacy and they will forward the refill request to us. Please allow 3 business days for your refill to be completed.          Additional Information About Your Visit        MyChart Information   "   [email or web address] lets you send messages to your doctor, view your test results, renew your prescriptions, schedule appointments and more. To sign up, go to [email or web address] . Click on \"Log in\" on the left side of the screen, which will take you to the Welcome page. Then click on \"Sign up Now\" on the right side of the page.     You will be asked to enter the access code listed below, as well as some personal information. Please follow the directions to create your username and password.     Your access code is: [email or web address]  Expires: 10/15/2018 12:52 PM     Your access code will  in 90 days. If you need help or a new code, please call your [facility] clinic or [phone].        Care EveryWhere ID     This is your Care EveryWhere ID. This could be used by other organizations to access your [facility] medical records  [id number]        Your Vitals Were     Pulse Temperature Height Pulse Oximetry Breastfeeding? BMI (Body Mass Index)    60 98.3  F (36.8  C) (Oral) 5' 6\" (1.676 m) 99% No 23.73 kg/m2       Blood Pressure from Last 3 Encounters:   18 138/66   18 115/70   17 134/71    Weight from Last 3 Encounters:   18 147 lb (66.7 kg)   18 147 lb (66.7 kg)   17 149 lb (67.6 kg)              We Performed the Following     Basic metabolic panel     C FOOT EXAM  NO CHARGE     CBC with platelets     GASTROENTEROLOGY ADULT REF PROCEDURE ONLY Misbah Braun [phone]; Dr. IRA Lea     Hemoglobin A1c     Lipid panel reflex to direct LDL Non-fasting     TSH with free T4 reflex          Today's Medication Changes          These changes are accurate as of 18 12:52 PM.  If you have any questions, ask your nurse or doctor.               Start taking these medicines.        Dose/Directions    cephALEXin 500 MG capsule   Commonly known as:  KEFLEX   Used for:  Infected cyst of Bartholin's gland duct   Started by:  Julio César Peres MD        Dose:  500 mg   Take 1 capsule " (500 mg) by mouth 3 times daily   Quantity:  21 capsule   Refills:  0            Where to get your medicines      These medications were sent to [place] [address] [place] [facility] [address]  [address] [facility] [address]     Phone:  [phone]     cephALEXin 500 MG capsule                Primary Care Provider Office Phone # Fax #    Julio César Peres [phone] [address] [facility] [address]  [facility] [address]        Equal Access to Services     [facility]: Hadii aad ku hadasho Soomaali, waaxda luqadaha, qaybta kaalmada adeegyada, waxay idiin hayaan adeeg jacque kim . So [facility] [phone].    ATENCIÓN: Si habla español, tiene a xiong disposición servicios gratuitos de asistencia lingüística. Llame al [phone].    We comply with applicable federal civil rights laws and [place] laws. We do not discriminate on the basis of race, color, national origin, age, disability, sex, sexual orientation, or gender identity.            Thank you!     Thank you for choosing [facility]  for your care. Our goal is always to provide you with excellent care. Hearing back from our patients is one way we can continue to improve our services. Please take a few minutes to complete the written survey that you may receive in the mail after your visit with us. Thank you!             Your Updated Medication List - Protect others around you: Learn how to safely use, store and throw away your medicines at www.disposemymeds.org.          This list is accurate as of 7/17/18 12:52 PM.  Always use your most recent med list.                   Brand Name Dispense Instructions for use Diagnosis    ACCU-CHEK DEIDRA PLUS test strip   Generic drug:  blood glucose monitoring     50 strip    TEST ONCE DAILY    Type 2 diabetes mellitus without complications (H)       ALPHAGAN P 0.1 % ophthalmic solution   Generic drug:  brimonidine      Place 1 drop into both eyes 2 times daily.         amLODIPine 5 MG tablet    NORVASC    90 tablet    Take 1 tablet (5 mg) by mouth daily    Essential hypertension, benign       aspirin 325 MG EC tablet     100 tablet    Take 1 tablet by mouth daily.        blood glucose monitoring lancets     1 Box    1 each daily.    Type 2 diabetes, HbA1c goal < 7% (H)       cephALEXin 500 MG capsule    KEFLEX    21 capsule    Take 1 capsule (500 mg) by mouth 3 times daily    Infected cyst of Bartholin's gland duct       colchicine 0.6 MG tablet    COLCRYS    20 tablet    Take 2 tablets at the first sign of flare, take 1 additional tablet one hour later.    Gout attack       cyclobenzaprine 10 MG tablet    FLEXERIL    30 tablet    Take 1 tablet (10 mg) by mouth 3 times daily as needed for muscle spasms    Back muscle spasm       irbesartan 300 MG tablet    AVAPRO    90 tablet    Take 1 tablet (300 mg) by mouth At Bedtime    Essential hypertension, benign       LORazepam 0.5 MG tablet    ATIVAN    30 tablet    Take 1 tablet (0.5 mg) by mouth daily as needed for anxiety    Anxiety       MULTIPLE VITAMIN PO      Take  by mouth. 2 childrens multi vitamins daily.        simvastatin 20 MG tablet    ZOCOR    90 tablet    TAKE ONE TABLET BY MOUTH AT BEDTIME    Hyperlipidemia LDL goal <100       spironolactone 25 MG tablet    ALDACTONE    90 tablet    Take 1 tablet (25 mg) by mouth daily    Essential hypertension, benign       TRAVATAN 0.004 % ophthalmic solution   Generic drug:  travoprost Z (benzalkonium)      1 drop every evening.        vitamin D 2000 units Caps      Take 2,000 Units by mouth.

## 2018-07-17 NOTE — PATIENT INSTRUCTIONS
Start the antibiotics and use heat to the area 4x a day for 15 minutes.  If this does not clear up over the next week or worsens call me.    I will send you a letter with your lab results.  If you do not get it within 2 weeks please call me.    Julio César Peres M.D.

## 2018-07-18 LAB
ANION GAP SERPL CALCULATED.3IONS-SCNC: 7 MMOL/L (ref 3–14)
BUN SERPL-MCNC: 17 MG/DL (ref 7–30)
CALCIUM SERPL-MCNC: 9.4 MG/DL (ref 8.5–10.1)
CHLORIDE SERPL-SCNC: 105 MMOL/L (ref 94–109)
CHOLEST SERPL-MCNC: 160 MG/DL
CO2 SERPL-SCNC: 27 MMOL/L (ref 20–32)
CREAT SERPL-MCNC: 1.12 MG/DL (ref 0.52–1.04)
GFR SERPL CREATININE-BSD FRML MDRD: 47 ML/MIN/1.7M2
GLUCOSE SERPL-MCNC: 95 MG/DL (ref 70–99)
HDLC SERPL-MCNC: 61 MG/DL
LDLC SERPL CALC-MCNC: 83 MG/DL
NONHDLC SERPL-MCNC: 99 MG/DL
POTASSIUM SERPL-SCNC: 4.1 MMOL/L (ref 3.4–5.3)
SODIUM SERPL-SCNC: 139 MMOL/L (ref 133–144)
TRIGL SERPL-MCNC: 79 MG/DL
TSH SERPL DL<=0.005 MIU/L-ACNC: 2.42 MU/L (ref 0.4–4)

## 2018-07-18 NOTE — PROGRESS NOTES
It was a pleasure seeing you.  I wanted to get back to you with your test results.  I have enclosed a copy for your records.    Your labs look very good.  This includes your blood count, chemistries, thyroid test and cholesterol.  Your diabetes test or a1c is also very good.    If you have any questions please call me.

## 2018-08-23 ENCOUNTER — SURGERY (OUTPATIENT)
Age: 78
End: 2018-08-23

## 2018-08-23 ENCOUNTER — HOSPITAL ENCOUNTER (OUTPATIENT)
Facility: CLINIC | Age: 78
Discharge: HOME | End: 2018-08-23
Attending: INTERNAL MEDICINE | Admitting: INTERNAL MEDICINE
Payer: COMMERCIAL

## 2018-08-23 VITALS
DIASTOLIC BLOOD PRESSURE: 61 MMHG | RESPIRATION RATE: 18 BRPM | BODY MASS INDEX: 23.78 KG/M2 | HEIGHT: 66 IN | SYSTOLIC BLOOD PRESSURE: 130 MMHG | WEIGHT: 148 LBS | OXYGEN SATURATION: 98 %

## 2018-08-23 LAB — COLONOSCOPY: NORMAL

## 2018-08-23 PROCEDURE — 25000128 H RX IP 250 OP 636: Performed by: INTERNAL MEDICINE

## 2018-08-23 PROCEDURE — G0121 COLON CA SCRN NOT HI RSK IND: HCPCS | Performed by: INTERNAL MEDICINE

## 2018-08-23 PROCEDURE — 45378 DIAGNOSTIC COLONOSCOPY: CPT | Performed by: INTERNAL MEDICINE

## 2018-08-23 PROCEDURE — G0500 MOD SEDAT ENDO SERVICE >5YRS: HCPCS | Performed by: INTERNAL MEDICINE

## 2018-08-23 RX ORDER — ONDANSETRON 2 MG/ML
4 INJECTION INTRAMUSCULAR; INTRAVENOUS
Status: DISCONTINUED | OUTPATIENT
Start: 2018-08-23 | End: 2018-08-23 | Stop reason: HOSPADM

## 2018-08-23 RX ORDER — FENTANYL CITRATE 50 UG/ML
INJECTION, SOLUTION INTRAMUSCULAR; INTRAVENOUS PRN
Status: DISCONTINUED | OUTPATIENT
Start: 2018-08-23 | End: 2018-08-23 | Stop reason: HOSPADM

## 2018-08-23 RX ORDER — LIDOCAINE 40 MG/G
CREAM TOPICAL
Status: DISCONTINUED | OUTPATIENT
Start: 2018-08-23 | End: 2018-08-23 | Stop reason: HOSPADM

## 2018-08-23 RX ADMIN — FENTANYL CITRATE 100 MCG: 50 INJECTION, SOLUTION INTRAMUSCULAR; INTRAVENOUS at 08:57

## 2018-08-23 RX ADMIN — MIDAZOLAM 2 MG: 1 INJECTION INTRAMUSCULAR; INTRAVENOUS at 09:05

## 2018-08-23 RX ADMIN — MIDAZOLAM 2 MG: 1 INJECTION INTRAMUSCULAR; INTRAVENOUS at 08:57

## 2018-10-01 ENCOUNTER — TRANSFERRED RECORDS (OUTPATIENT)
Dept: HEALTH INFORMATION MANAGEMENT | Facility: CLINIC | Age: 78
End: 2018-10-01

## 2018-10-25 ENCOUNTER — TELEPHONE (OUTPATIENT)
Dept: FAMILY MEDICINE | Facility: CLINIC | Age: 78
End: 2018-10-25

## 2018-10-25 NOTE — TELEPHONE ENCOUNTER
Reason for Call:  Other appointment    Detailed comments: The patient has had Flank pain before on the Right side and now it is back and she has an appointment for 11/27 but wants to know if she can be seen sooner?    Phone Number Patient can be reached at: Home number on file [phone] (home)    Best Time: anytime    Can we leave a detailed message on this number? YES    Call taken on 10/25/2018 at 3:02 PM by Suzie Ceballos

## 2018-10-26 ENCOUNTER — TELEPHONE (OUTPATIENT)
Dept: FAMILY MEDICINE | Facility: CLINIC | Age: 78
End: 2018-10-26

## 2018-10-26 ENCOUNTER — OFFICE VISIT (OUTPATIENT)
Dept: FAMILY MEDICINE | Facility: CLINIC | Age: 78
End: 2018-10-26
Payer: COMMERCIAL

## 2018-10-26 VITALS
SYSTOLIC BLOOD PRESSURE: 136 MMHG | WEIGHT: 150 LBS | HEIGHT: 66 IN | DIASTOLIC BLOOD PRESSURE: 64 MMHG | BODY MASS INDEX: 24.11 KG/M2 | HEART RATE: 90 BPM | OXYGEN SATURATION: 97 % | TEMPERATURE: 97.7 F

## 2018-10-26 DIAGNOSIS — M54.50 ACUTE RIGHT-SIDED LOW BACK PAIN WITHOUT SCIATICA: Primary | ICD-10-CM

## 2018-10-26 LAB
ALBUMIN UR-MCNC: NEGATIVE MG/DL
APPEARANCE UR: CLEAR
BILIRUB UR QL STRIP: NEGATIVE
COLOR UR AUTO: YELLOW
GLUCOSE UR STRIP-MCNC: NEGATIVE MG/DL
HGB UR QL STRIP: NEGATIVE
KETONES UR STRIP-MCNC: NEGATIVE MG/DL
LEUKOCYTE ESTERASE UR QL STRIP: NEGATIVE
NITRATE UR QL: NEGATIVE
PH UR STRIP: 5.5 PH (ref 5–7)
SOURCE: NORMAL
SP GR UR STRIP: <=1.005 (ref 1–1.03)
UROBILINOGEN UR STRIP-ACNC: 0.2 EU/DL (ref 0.2–1)

## 2018-10-26 PROCEDURE — 81003 URINALYSIS AUTO W/O SCOPE: CPT | Performed by: INTERNAL MEDICINE

## 2018-10-26 PROCEDURE — 99213 OFFICE O/P EST LOW 20 MIN: CPT | Performed by: INTERNAL MEDICINE

## 2018-10-26 NOTE — PROGRESS NOTES
The patient is here for right low back pain.  It has been 2 weeks.  It is a nagging ache that is not severe but is fairly constant worse at times and others.  She had no trauma and no new activities.  The pain does not radiate and there is no leg tingling numbness or weakness.  No rashes or fevers.  No GI symptoms.  No  symptoms.  Nothing seems to make it better or worse.    Past Medical History:   Diagnosis Date     Anxiety     rare ativan use     Atypical chest pain 2013    neg est echo     CKD (chronic kidney disease) stage 3, GFR 30-59 ml/min (H)      Dizziness 2014    neg est echo     Glaucoma      Gout attack 6/29/15    elevated uric acid     HTN (hypertension)     stopped hctz 2015 due to gout     Hx of colonoscopy 2008, 2018    tics     Hypercholesteremia     added rx 12/12, aches with zocor, then added lipitor 2015     OA (osteoarthritis)      Peripheral neuropathy      Stroke (H) 2004    intimal dissection     Type II or unspecified type diabetes mellitus without mention of complication, not stated as uncontrolled     diet controlled     Vertigo 2004    hosp fsd, mri pos     Vitamin D deficiency 2013     Past Surgical History:   Procedure Laterality Date     COLONOSCOPY N/A 8/23/2018    Procedure: COLONOSCOPY;  colonoscopy;  Surgeon: Alexei Lea MD;  Location:  GI     ECTOPIC PREGNANCY SURGERY  80's     HYSTERECTOMY, PAP NO LONGER INDICATED  1980    fibriods and endometriosis, maggy and bso     Social History     Social History     Marital status:      Spouse name: N/A     Number of children: 2     Years of education: N/A     Occupational History     retired, worked for [employer]      Social History Main Topics     Smoking status: Never Smoker     Smokeless tobacco: Never Used     Alcohol use No     Drug use: No     Sexual activity: Not Currently     Other Topics Concern     Not on file     Social History Narrative     Current Outpatient Prescriptions   Medication Sig Dispense Refill      "ACCU-CHEK DEIDRA PLUS test strip TEST ONCE DAILY 50 strip 6     ACCU-CHEK FASTCLIX LANCETS MISC 1 each daily. 1 Box 3     amLODIPine (NORVASC) 5 MG tablet Take 1 tablet (5 mg) by mouth daily 90 tablet 3     aspirin 325 MG EC tablet Take 1 tablet by mouth daily. 100 tablet 3     brimonidine (ALPHAGAN P) 0.1 % ophthalmic solution Place 1 drop into both eyes 2 times daily.       Cholecalciferol (VITAMIN D) 2000 UNITS CAPS Take 2,000 Units by mouth.       irbesartan (AVAPRO) 300 MG tablet Take 1 tablet (300 mg) by mouth At Bedtime 90 tablet 3     LORazepam (ATIVAN) 0.5 MG tablet Take 1 tablet (0.5 mg) by mouth daily as needed for anxiety 30 tablet 0     MULTIPLE VITAMIN PO Take  by mouth. 2 childrens multi vitamins daily.        simvastatin (ZOCOR) 20 MG tablet TAKE ONE TABLET BY MOUTH AT BEDTIME 90 tablet 3     spironolactone (ALDACTONE) 25 MG tablet Take 1 tablet (25 mg) by mouth daily 90 tablet 3     travoprost Z, benzalkonium, (TRAVATAN) 0.004 % ophthalmic solution 1 drop every evening.       No Known Allergies  FAMILY HISTORY NOTED AND REVIEWED    REVIEW OF SYSTEMS: above    PHYSICAL EXAM    /64  Pulse 90  Temp 97.7  F (36.5  C) (Oral)  Ht 5' 6\" (1.676 m)  Wt 150 lb (68 kg)  SpO2 97%  Breastfeeding? No  BMI 24.21 kg/m2    Patient appears non toxic  Abdomen normal active bowel sounds, soft non-tender, no mgr, no hepatosplenomegaly  No back lesions or tenderness  Gait within normal limits    ua sent    ASSESSMENT:  Low back pain, I believe msk, doubt gi or genitourinary issues, doubt stone, tumor    PLAN:  If ua neg then heat and physical therapy and if not gone with that let me know    Julio César Peres M.D.        "

## 2018-10-26 NOTE — PATIENT INSTRUCTIONS
I will let you know the urine result.    Continue the tylenol and use heat on the back 4x a day for 20 minutes.    If this does not fix the discomfort then I want you to do physical therapy    If this does not fix it let me know    Julio César Peres M.D.

## 2018-10-26 NOTE — MR AVS SNAPSHOT
After Visit Summary   10/26/2018    Ashley Liu    MRN: [id number]           Patient Information     Date Of Birth          1940        Visit Information        Provider Department      10/26/2018 3:30 PM Julio César Peres MD [facility]        Today's Diagnoses     Acute right-sided low back pain without sciatica    -  1      Care Instructions    I will let you know the urine result.    Continue the tylenol and use heat on the back 4x a day for 20 minutes.    If this does not fix the discomfort then I want you to do physical therapy    If this does not fix it let me know    Julio César Peres M.D.            Follow-ups after your visit        Additional Services     [facility], HAND, AND CHIROPRACTIC REFERRAL       Physical Therapy, Hand Therapy and Chiropractic Care are available through:  *[facility]  *Hand Therapy (Occupational Therapy or Physical Therapy)  *[facility]    Call one number to schedule at any of the above locations: [phone].    Physical therapy, Hand therapy and/or Chiropractic care has been recommended by your physician as an excellent treatment option to reduce pain and help people return to normal activities, including sports.  Therapy and/or chiropractic care services are a great complement or alternative to expensive and invasive surgery, injections, or long-term use of prescription medications. The primary goal is to identify the underlying problem and provide you the tools to manage your condition on your own.     Please be aware that coverage of these services is subject to the terms and limitations of your health insurance plan.  Call member services at your health plan with any benefit or coverage questions.      Please bring the following to your appointment:  *Your personal calendar for scheduling future appointments  *Comfortable clothing                  Follow-up notes from your care team     Return in  "about 4 months (around 2019).      Future tests that were ordered for you today     Open Future Orders        Priority Expected Expires Ordered    [facility], HAND, AND CHIROPRACTIC REFERRAL Routine  10/26/2019 10/26/2018            Who to contact     If you have questions or need follow up information about today's clinic visit or your schedule please contact [facility] directly at [phone].  Normal or non-critical lab and imaging results will be communicated to you by Int[employer] Fingerprint[employer], letter or phone within 4 business days after the clinic has received the results. If you do not hear from us within 7 days, please contact the clinic through [employer] or phone. If you have a critical or abnormal lab result, we will notify you by phone as soon as possible.  Submit refill requests through [employer] or call your pharmacy and they will forward the refill request to us. Please allow 3 business days for your refill to be completed.          Additional Information About Your Visit        [employer] Information     [employer] lets you send messages to your doctor, view your test results, renew your prescriptions, schedule appointments and more. To sign up, go to [email or web address] . Click on \"Log in\" on the left side of the screen, which will take you to the Welcome page. Then click on \"Sign up Now\" on the right side of the page.     You will be asked to enter the access code listed below, as well as some personal information. Please follow the directions to create your username and password.     Your access code is: [email or web address]  Expires: 2019  3:26 PM     Your access code will  in 90 days. If you need help or a new code, please call your [facility] clinic or [phone].        Care EveryWhere ID     This is your Care EveryWhere ID. This could be used by other organizations to access your [facility] medical records  [id number]        Your Vitals Were     Pulse Temperature Height Pulse Oximetry Breastfeeding? " "BMI (Body Mass Index)    90 97.7  F (36.5  C) (Oral) 5' 6\" (1.676 m) 97% No 24.21 kg/m2       Blood Pressure from Last 3 Encounters:   10/26/18 136/64   08/23/18 130/61   07/17/18 138/66    Weight from Last 3 Encounters:   10/26/18 150 lb (68 kg)   08/23/18 148 lb (67.1 kg)   07/17/18 147 lb (66.7 kg)              We Performed the Following     *UA reflex to Microscopic          Today's Medication Changes          These changes are accurate as of 10/26/18  3:40 PM.  If you have any questions, ask your nurse or doctor.               Stop taking these medicines if you haven't already. Please contact your care team if you have questions.     cephALEXin 500 MG capsule   Commonly known as:  KEFLEX   Stopped by:  Julio César Peres MD                    Primary Care Provider Office Phone # Fax #    Julio César Peres [phone] [address] [address]        Equal Access to Services     [facility]: Hadii aad ku hadasho Soildefonso, waaxda luqadaha, qaybta kaalmada chandu, rosetta kim . So [facility] [phone].    ATENCIÓN: Si habla español, tiene a xiong disposición servicios gratuitos de asistencia lingüística. Bekah al [phone].    We comply with applicable federal civil rights laws and [place] laws. We do not discriminate on the basis of race, color, national origin, age, disability, sex, sexual orientation, or gender identity.            Thank you!     Thank you for choosing [facility]  for your care. Our goal is always to provide you with excellent care. Hearing back from our patients is one way we can continue to improve our services. Please take a few minutes to complete the written survey that you may receive in the mail after your visit with us. Thank you!             Your Updated Medication List - Protect others around you: Learn how to safely use, store and throw away your medicines at www.disposemymeds.org.          This " list is accurate as of 10/26/18  3:40 PM.  Always use your most recent med list.                   Brand Name Dispense Instructions for use Diagnosis    ACCU-CHEK DEIDRA PLUS test strip   Generic drug:  blood glucose monitoring     50 strip    TEST ONCE DAILY    Type 2 diabetes mellitus without complications (H)       ALPHAGAN P 0.1 % ophthalmic solution   Generic drug:  brimonidine      Place 1 drop into both eyes 2 times daily.        amLODIPine 5 MG tablet    NORVASC    90 tablet    Take 1 tablet (5 mg) by mouth daily    Essential hypertension, benign       aspirin 325 MG EC tablet     100 tablet    Take 1 tablet by mouth daily.        blood glucose monitoring lancets     1 Box    1 each daily.    Type 2 diabetes, HbA1c goal < 7% (H)       irbesartan 300 MG tablet    AVAPRO    90 tablet    Take 1 tablet (300 mg) by mouth At Bedtime    Essential hypertension, benign       LORazepam 0.5 MG tablet    ATIVAN    30 tablet    Take 1 tablet (0.5 mg) by mouth daily as needed for anxiety    Anxiety       MULTIPLE VITAMIN PO      Take  by mouth. 2 childrens multi vitamins daily.        simvastatin 20 MG tablet    ZOCOR    90 tablet    TAKE ONE TABLET BY MOUTH AT BEDTIME    Hyperlipidemia LDL goal <100       spironolactone 25 MG tablet    ALDACTONE    90 tablet    Take 1 tablet (25 mg) by mouth daily    Essential hypertension, benign       TRAVATAN 0.004 % ophthalmic solution   Generic drug:  travoprost Z (benzalkonium)      1 drop every evening.        vitamin D 2000 units Caps      Take 2,000 Units by mouth.

## 2018-10-26 NOTE — TELEPHONE ENCOUNTER
Dr. Peres I called and offered today at 10:30 but patient  Is not available. Can you fit her in next week?

## 2018-11-19 ENCOUNTER — TRANSFERRED RECORDS (OUTPATIENT)
Dept: HEALTH INFORMATION MANAGEMENT | Facility: CLINIC | Age: 78
End: 2018-11-19

## 2019-01-20 DIAGNOSIS — E78.5 HYPERLIPIDEMIA LDL GOAL <100: ICD-10-CM

## 2019-01-20 DIAGNOSIS — I10 ESSENTIAL HYPERTENSION, BENIGN: ICD-10-CM

## 2019-01-21 NOTE — TELEPHONE ENCOUNTER
"amLODIPine (NORVASC) 5 MG tablet 90 tablet 3 11/16/2017  No   Sig - Route: Take 1 tablet (5 mg) by mouth daily        spironolactone (ALDACTONE) 25 MG tablet 90 tablet 3 11/16/2017  No   Sig - Route: Take 1 tablet (25 mg) by mouth daily        simvastatin (ZOCOR) 20 MG tablet 90 tablet 3 11/16/2017  No   Sig: TAKE ONE TABLET BY MOUTH AT BEDTIME       irbesartan (AVAPRO) 300 MG tablet 90 tablet 3 11/16/2017  No   Sig - Route: Take 1 tablet (300 mg) by mouth At Bedtime      Last Written Prescription Date:  11/16/2017  Last Fill Quantity: 90,  # refills: 3   Last office visit: 10/26/2018 with prescribing provider:     Future Office Visit:      Requested Prescriptions   Pending Prescriptions Disp Refills     irbesartan (AVAPRO) 300 MG tablet [Pharmacy Med Name: IRBESARTAN 300 MG TABLET] 90 tablet 3     Sig: TAKE 1 TABLET (300 MG) BY MOUTH AT BEDTIME    Angiotensin-II Receptors Failed - 1/20/2019  6:29 PM       Failed - Normal serum creatinine on file in past 12 months    Recent Labs   Lab Test 07/17/18  1256   CR 1.12*            Passed - Blood pressure under 140/90 in past 12 months    BP Readings from Last 3 Encounters:   10/26/18 136/64   08/23/18 130/61   07/17/18 138/66                Passed - Recent (12 mo) or future (30 days) visit within the authorizing provider's specialty    Patient had office visit in the last 12 months or has a visit in the next 30 days with authorizing provider or within the authorizing provider's specialty.  See \"Patient Info\" tab in inbasket, or \"Choose Columns\" in Meds & Orders section of the refill encounter.             Passed - Medication is active on med list       Passed - Patient is age 18 or older       Passed - No active pregnancy on record       Passed - Normal serum potassium on file in past 12 months    Recent Labs   Lab Test 07/17/18  1256   POTASSIUM 4.1                   Passed - No positive pregnancy test in past 12 months        simvastatin (ZOCOR) 20 MG tablet [Pharmacy " "Med Name: SIMVASTATIN 20 MG TABLET] 90 tablet 2     Sig: TAKE ONE TABLET BY MOUTH AT BEDTIME    Statins Protocol Passed - 1/20/2019  6:29 PM       Passed - LDL on file in past 12 months    Recent Labs   Lab Test 07/17/18  1256   LDL 83            Passed - No abnormal creatine kinase in past 12 months    Recent Labs   Lab Test 09/04/14  0947   CKT 91               Passed - Recent (12 mo) or future (30 days) visit within the authorizing provider's specialty    Patient had office visit in the last 12 months or has a visit in the next 30 days with authorizing provider or within the authorizing provider's specialty.  See \"Patient Info\" tab in inbasket, or \"Choose Columns\" in Meds & Orders section of the refill encounter.             Passed - Medication is active on med list       Passed - Patient is age 18 or older       Passed - No active pregnancy on record       Passed - No positive pregnancy test in past 12 months        spironolactone (ALDACTONE) 25 MG tablet [Pharmacy Med Name: SPIRONOLACTONE 25 MG TABLET] 90 tablet 3     Sig: TAKE 1 TABLET (25 MG) BY MOUTH DAILY    Diuretics (Including Combos) Protocol Failed - 1/20/2019  6:29 PM       Failed - Normal serum creatinine on file in past 12 months    Recent Labs   Lab Test 07/17/18  1256   CR 1.12*             Passed - Blood pressure under 140/90 in past 12 months    BP Readings from Last 3 Encounters:   10/26/18 136/64   08/23/18 130/61   07/17/18 138/66                Passed - Recent (12 mo) or future (30 days) visit within the authorizing provider's specialty    Patient had office visit in the last 12 months or has a visit in the next 30 days with authorizing provider or within the authorizing provider's specialty.  See \"Patient Info\" tab in inbasket, or \"Choose Columns\" in Meds & Orders section of the refill encounter.             Passed - Medication is active on med list       Passed - Patient is age 18 or older       Passed - No active pregancy on record       " "Passed - Normal serum potassium on file in past 12 months    Recent Labs   Lab Test 07/17/18  1256   POTASSIUM 4.1                   Passed - Normal serum sodium on file in past 12 months    Recent Labs   Lab Test 07/17/18  1256                Passed - No positive pregnancy test in past 12 months        amLODIPine (NORVASC) 5 MG tablet [Pharmacy Med Name: AMLODIPINE BESYLATE 5 MG TAB] 90 tablet 3     Sig: TAKE 1 TABLET (5 MG) BY MOUTH DAILY    Calcium Channel Blockers Protocol  Failed - 1/20/2019  6:29 PM       Failed - Normal serum creatinine on file in past 12 months    Recent Labs   Lab Test 07/17/18  1256   CR 1.12*            Passed - Blood pressure under 140/90 in past 12 months    BP Readings from Last 3 Encounters:   10/26/18 136/64   08/23/18 130/61   07/17/18 138/66                Passed - Recent (12 mo) or future (30 days) visit within the authorizing provider's specialty    Patient had office visit in the last 12 months or has a visit in the next 30 days with authorizing provider or within the authorizing provider's specialty.  See \"Patient Info\" tab in inbasket, or \"Choose Columns\" in Meds & Orders section of the refill encounter.             Passed - Medication is active on med list       Passed - Patient is age 18 or older       Passed - No active pregnancy on record       Passed - No positive pregnancy test in past 12 months            "

## 2019-01-22 RX ORDER — SPIRONOLACTONE 25 MG/1
25 TABLET ORAL DAILY
Qty: 90 TABLET | Refills: 1 | Status: SHIPPED | OUTPATIENT
Start: 2019-01-22 | End: 2019-03-15

## 2019-01-22 RX ORDER — SIMVASTATIN 20 MG
TABLET ORAL
Qty: 90 TABLET | Refills: 1 | Status: SHIPPED | OUTPATIENT
Start: 2019-01-22 | End: 2019-03-15

## 2019-01-22 RX ORDER — IRBESARTAN 300 MG/1
300 TABLET ORAL AT BEDTIME
Qty: 90 TABLET | Refills: 1 | Status: SHIPPED | OUTPATIENT
Start: 2019-01-22 | End: 2019-03-15

## 2019-01-22 RX ORDER — AMLODIPINE BESYLATE 5 MG/1
5 TABLET ORAL DAILY
Qty: 90 TABLET | Refills: 1 | Status: SHIPPED | OUTPATIENT
Start: 2019-01-22 | End: 2019-03-15

## 2019-01-22 NOTE — TELEPHONE ENCOUNTER
Simvastatin - Prescription approved per [facility] Refill Protocol.    Amlodipine, Spironolactone, Irbesartan - Routing refill request to provider for review/approval because:  Labs out of range:  Last creatinine 1.12    [facility] ROTH RN

## 2019-01-25 DIAGNOSIS — E11.9 TYPE 2 DIABETES MELLITUS WITHOUT COMPLICATIONS (H): ICD-10-CM

## 2019-01-25 RX ORDER — BLOOD SUGAR DIAGNOSTIC
STRIP MISCELLANEOUS
Qty: 100 STRIP | Refills: 1 | Status: SHIPPED | OUTPATIENT
Start: 2019-01-25 | End: 2019-08-21

## 2019-01-25 NOTE — TELEPHONE ENCOUNTER
"ACCU-CHEK DEIDRA PLUS test strip 50 strip 6 1/23/2018  No   Sig: TEST ONCE DAILY       Last Written Prescription Date:  01/23/2018  Last Fill Quantity: 50,  # refills: 6   Last office visit: 10/26/2018 with prescribing provider:     Future Office Visit:      Requested Prescriptions   Pending Prescriptions Disp Refills     ACCU-CHEK DEIDRA PLUS test strip [Pharmacy Med Name: ACCU-CHEK DEIDRA PLUS TEST STRP] 50 strip 6     Sig: TEST ONCE DAILY    Diabetic Supplies Protocol Passed - 1/25/2019  1:42 AM       Passed - Medication is active on med list       Passed - Patient is 18 years of age or older       Passed - Recent (6 mo) or future (30 days) visit within the authorizing provider's specialty    Patient had office visit in the last 6 months or has a visit in the next 30 days with authorizing provider.  See \"Patient Info\" tab in inbasket, or \"Choose Columns\" in Meds & Orders section of the refill encounter.              "

## 2019-03-15 ENCOUNTER — OFFICE VISIT (OUTPATIENT)
Dept: FAMILY MEDICINE | Facility: CLINIC | Age: 79
End: 2019-03-15
Payer: COMMERCIAL

## 2019-03-15 VITALS
OXYGEN SATURATION: 100 % | DIASTOLIC BLOOD PRESSURE: 74 MMHG | HEIGHT: 66 IN | WEIGHT: 157 LBS | BODY MASS INDEX: 25.23 KG/M2 | TEMPERATURE: 97.2 F | HEART RATE: 70 BPM | SYSTOLIC BLOOD PRESSURE: 131 MMHG

## 2019-03-15 DIAGNOSIS — G62.9 PERIPHERAL POLYNEUROPATHY: ICD-10-CM

## 2019-03-15 DIAGNOSIS — I10 ESSENTIAL HYPERTENSION, BENIGN: ICD-10-CM

## 2019-03-15 DIAGNOSIS — E78.5 HYPERLIPIDEMIA LDL GOAL <100: ICD-10-CM

## 2019-03-15 DIAGNOSIS — E55.9 VITAMIN D DEFICIENCY: ICD-10-CM

## 2019-03-15 DIAGNOSIS — N18.30 TYPE 2 DIABETES MELLITUS WITH STAGE 3 CHRONIC KIDNEY DISEASE, WITHOUT LONG-TERM CURRENT USE OF INSULIN (H): ICD-10-CM

## 2019-03-15 DIAGNOSIS — E11.22 TYPE 2 DIABETES MELLITUS WITH STAGE 3 CHRONIC KIDNEY DISEASE, WITHOUT LONG-TERM CURRENT USE OF INSULIN (H): ICD-10-CM

## 2019-03-15 DIAGNOSIS — Z74.09 DECREASED FUNCTIONAL MOBILITY AND ENDURANCE: ICD-10-CM

## 2019-03-15 DIAGNOSIS — N18.30 CKD (CHRONIC KIDNEY DISEASE) STAGE 3, GFR 30-59 ML/MIN (H): Primary | ICD-10-CM

## 2019-03-15 DIAGNOSIS — R06.09 DOE (DYSPNEA ON EXERTION): ICD-10-CM

## 2019-03-15 LAB
ANION GAP SERPL CALCULATED.3IONS-SCNC: 5 MMOL/L (ref 3–14)
BUN SERPL-MCNC: 21 MG/DL (ref 7–30)
CALCIUM SERPL-MCNC: 9.7 MG/DL (ref 8.5–10.1)
CHLORIDE SERPL-SCNC: 108 MMOL/L (ref 94–109)
CHOLEST SERPL-MCNC: 156 MG/DL
CO2 SERPL-SCNC: 27 MMOL/L (ref 20–32)
CREAT SERPL-MCNC: 1.19 MG/DL (ref 0.52–1.04)
CREAT UR-MCNC: 97 MG/DL
DEPRECATED CALCIDIOL+CALCIFEROL SERPL-MC: 41 UG/L (ref 20–75)
ERYTHROCYTE [DISTWIDTH] IN BLOOD BY AUTOMATED COUNT: 13.6 % (ref 10–15)
GFR SERPL CREATININE-BSD FRML MDRD: 44 ML/MIN/{1.73_M2}
GLUCOSE SERPL-MCNC: 151 MG/DL (ref 70–99)
HBA1C MFR BLD: 7.5 % (ref 0–5.6)
HCT VFR BLD AUTO: 39.2 % (ref 35–47)
HDLC SERPL-MCNC: 49 MG/DL
HGB BLD-MCNC: 12.9 G/DL (ref 11.7–15.7)
LDLC SERPL CALC-MCNC: 91 MG/DL
MCH RBC QN AUTO: 31.1 PG (ref 26.5–33)
MCHC RBC AUTO-ENTMCNC: 32.9 G/DL (ref 31.5–36.5)
MCV RBC AUTO: 95 FL (ref 78–100)
MICROALBUMIN UR-MCNC: 7 MG/L
MICROALBUMIN/CREAT UR: 7.07 MG/G CR (ref 0–25)
NONHDLC SERPL-MCNC: 107 MG/DL
PLATELET # BLD AUTO: 289 10E9/L (ref 150–450)
POTASSIUM SERPL-SCNC: 4.5 MMOL/L (ref 3.4–5.3)
RBC # BLD AUTO: 4.15 10E12/L (ref 3.8–5.2)
SODIUM SERPL-SCNC: 140 MMOL/L (ref 133–144)
TRIGL SERPL-MCNC: 82 MG/DL
TSH SERPL DL<=0.005 MIU/L-ACNC: 2.34 MU/L (ref 0.4–4)
WBC # BLD AUTO: 5.1 10E9/L (ref 4–11)

## 2019-03-15 PROCEDURE — 99207 C FOOT EXAM  NO CHARGE: CPT | Performed by: INTERNAL MEDICINE

## 2019-03-15 PROCEDURE — 99214 OFFICE O/P EST MOD 30 MIN: CPT | Performed by: INTERNAL MEDICINE

## 2019-03-15 PROCEDURE — 80048 BASIC METABOLIC PNL TOTAL CA: CPT | Performed by: INTERNAL MEDICINE

## 2019-03-15 PROCEDURE — 82306 VITAMIN D 25 HYDROXY: CPT | Performed by: INTERNAL MEDICINE

## 2019-03-15 PROCEDURE — 84443 ASSAY THYROID STIM HORMONE: CPT | Performed by: INTERNAL MEDICINE

## 2019-03-15 PROCEDURE — 36415 COLL VENOUS BLD VENIPUNCTURE: CPT | Performed by: INTERNAL MEDICINE

## 2019-03-15 PROCEDURE — 83036 HEMOGLOBIN GLYCOSYLATED A1C: CPT | Performed by: INTERNAL MEDICINE

## 2019-03-15 PROCEDURE — 80061 LIPID PANEL: CPT | Performed by: INTERNAL MEDICINE

## 2019-03-15 PROCEDURE — 85027 COMPLETE CBC AUTOMATED: CPT | Performed by: INTERNAL MEDICINE

## 2019-03-15 PROCEDURE — 82043 UR ALBUMIN QUANTITATIVE: CPT | Performed by: INTERNAL MEDICINE

## 2019-03-15 RX ORDER — SPIRONOLACTONE 25 MG/1
25 TABLET ORAL DAILY
Qty: 90 TABLET | Refills: 3 | Status: SHIPPED | OUTPATIENT
Start: 2019-03-15 | End: 2019-09-16

## 2019-03-15 RX ORDER — IRBESARTAN 300 MG/1
300 TABLET ORAL AT BEDTIME
Qty: 90 TABLET | Refills: 3 | Status: SHIPPED | OUTPATIENT
Start: 2019-03-15 | End: 2019-09-16

## 2019-03-15 RX ORDER — AMLODIPINE BESYLATE 5 MG/1
5 TABLET ORAL DAILY
Qty: 90 TABLET | Refills: 3 | Status: SHIPPED | OUTPATIENT
Start: 2019-03-15 | End: 2019-09-16

## 2019-03-15 RX ORDER — SIMVASTATIN 20 MG
20 TABLET ORAL AT BEDTIME
Qty: 90 TABLET | Refills: 3 | Status: SHIPPED | OUTPATIENT
Start: 2019-03-15 | End: 2019-09-16

## 2019-03-15 ASSESSMENT — MIFFLIN-ST. JEOR: SCORE: 1218.9

## 2019-03-15 NOTE — LETTER
[facility]  [address] [facility]  [address]  [place]  [address]  Tel: [phone]    March 18, 2019    Ashley Liu  [address] [address]        Dear Ms. Liu,    It was a pleasure seeing you.  I wanted to get back to you with your test results.  I have enclosed a copy for your records.    For the most part your labs look good.  The only one I am worried about is the diabetes test or hemoglobin a1c.  It is 7.5 which is higher then 8 months ago.  Please be sure to exercise and eat a healthy diet to keep this down.    Your vitamin D level, blood count, urine, thyroid, chemistries and cholesterol are all fine.    If you have any questions please call me.        Sincerely,    Julio César Peres MD          Enclosure: Lab Results    Results for orders placed or performed in visit on 03/15/19   Basic metabolic panel   Result Value Ref Range    Sodium 140 133 - 144 mmol/L    Potassium 4.5 3.4 - 5.3 mmol/L    Chloride 108 94 - 109 mmol/L    Carbon Dioxide 27 20 - 32 mmol/L    Anion Gap 5 3 - 14 mmol/L    Glucose 151 (H) 70 - 99 mg/dL    Urea Nitrogen 21 7 - 30 mg/dL    Creatinine 1.19 (H) 0.52 - 1.04 mg/dL    GFR Estimate 44 (L) >60 mL/min/[1.73_m2]    GFR Estimate If Black 51 (L) >60 mL/min/[1.73_m2]    Calcium 9.7 8.5 - 10.1 mg/dL   CBC with platelets   Result Value Ref Range    WBC 5.1 4.0 - 11.0 10e9/L    RBC Count 4.15 3.8 - 5.2 10e12/L    Hemoglobin 12.9 11.7 - 15.7 g/dL    Hematocrit 39.2 35.0 - 47.0 %    MCV 95 78 - 100 fl    MCH 31.1 26.5 - 33.0 pg    MCHC 32.9 31.5 - 36.5 g/dL    RDW 13.6 10.0 - 15.0 %    Platelet Count 289 150 - 450 10e9/L   Albumin Random Urine Quantitative with Creat Ratio   Result Value Ref Range    Creatinine Urine 97 mg/dL    Albumin Urine mg/L 7 mg/L    Albumin Urine mg/g Cr 7.07 0 - 25 mg/g Cr   Hemoglobin A1c   Result Value Ref Range    Hemoglobin A1C 7.5 (H) 0 - 5.6 %   Lipid panel reflex to direct LDL Non-fasting   Result Value Ref Range    Cholesterol  156 <200 mg/dL    Triglycerides 82 <150 mg/dL    HDL Cholesterol 49 (L) >49 mg/dL    LDL Cholesterol Calculated 91 <100 mg/dL    Non HDL Cholesterol 107 <130 mg/dL   TSH with free T4 reflex   Result Value Ref Range    TSH 2.34 0.40 - 4.00 mU/L   Vitamin D Deficiency   Result Value Ref Range    Vitamin D Deficiency screening 41 20 - 75 ug/L

## 2019-03-15 NOTE — PROGRESS NOTES
The patient presents for follow-up on multiple issues.    The patient has diabetes and checks her sugars once a day with an average roughly 140.  Her eye exam was normal in October.  She is not on medication for this.  She has no sores on her feet or toes.    The patient had some chest tightness a month ago.  Since then she has not had this but has decreased energy and is harder to do things when she exerts herself but does not of chest discomfort.  She may have some slight shortness of breath.  No shortness of breath at rest, PND or edema.  No coughs or fevers.    The patient otherwise has felt well.  No other cardiovascular, respiratory, GI or  symptoms.    Past Medical History:   Diagnosis Date     Anxiety     rare ativan use     Atypical chest pain 2013    neg est echo     CKD (chronic kidney disease) stage 3, GFR 30-59 ml/min (H)      Dizziness 2014    neg est echo     Glaucoma      Gout attack 6/29/15    elevated uric acid     HTN (hypertension)     stopped hctz 2015 due to gout     Hx of colonoscopy 2008, 2018    tics     Hypercholesteremia     added rx 12/12, aches with zocor, then added lipitor 2015     OA (osteoarthritis)      Peripheral neuropathy      Stroke (H) 2004    intimal dissection     Type II or unspecified type diabetes mellitus without mention of complication, not stated as uncontrolled     diet controlled     Vertigo 2004    hosp fsd, mri pos     Vitamin D deficiency 2013     Past Surgical History:   Procedure Laterality Date     COLONOSCOPY N/A 8/23/2018    Procedure: COLONOSCOPY;  colonoscopy;  Surgeon: Alexei Lea MD;  Location:  GI     ECTOPIC PREGNANCY SURGERY  80's     HYSTERECTOMY, PAP NO LONGER INDICATED  1980    fibriods and endometriosis, maggy and bso     Social History     Socioeconomic History     Marital status:      Spouse name: Not on file     Number of children: 2     Years of education: Not on file     Highest education level: Not on file   Occupational  History     Occupation: retired, worked for [employer]   Social Needs     Financial resource strain: Not on file     Food insecurity:     Worry: Not on file     Inability: Not on file     Transportation needs:     Medical: Not on file     Non-medical: Not on file   Tobacco Use     Smoking status: Never Smoker     Smokeless tobacco: Never Used   Substance and Sexual Activity     Alcohol use: No     Alcohol/week: 0.0 oz     Drug use: No     Sexual activity: Not Currently   Lifestyle     Physical activity:     Days per week: Not on file     Minutes per session: Not on file     Stress: Not on file   Relationships     Social connections:     Talks on phone: Not on file     Gets together: Not on file     Attends Orthodox service: Not on file     Active member of club or organization: Not on file     Attends meetings of clubs or organizations: Not on file     Relationship status: Not on file     Intimate partner violence:     Fear of current or ex partner: Not on file     Emotionally abused: Not on file     Physically abused: Not on file     Forced sexual activity: Not on file   Other Topics Concern     Parent/sibling w/ CABG, MI or angioplasty before 65F 55M? Not Asked   Social History Narrative     Not on file     Current Outpatient Medications   Medication Sig Dispense Refill     ACCU-CHEK DEIDRA PLUS test strip TEST ONCE DAILY 100 strip 1     ACCU-CHEK FASTCLIX LANCETS MISC 1 each daily. 1 Box 3     amLODIPine (NORVASC) 5 MG tablet Take 1 tablet (5 mg) by mouth daily 90 tablet 3     aspirin 325 MG EC tablet Take 1 tablet by mouth daily. 100 tablet 3     brimonidine (ALPHAGAN P) 0.1 % ophthalmic solution Place 1 drop into both eyes 2 times daily.       Cholecalciferol (VITAMIN D) 2000 UNITS CAPS Take 2,000 Units by mouth.       irbesartan (AVAPRO) 300 MG tablet Take 1 tablet (300 mg) by mouth At Bedtime 90 tablet 3     LORazepam (ATIVAN) 0.5 MG tablet Take 1 tablet (0.5 mg) by mouth daily as needed for anxiety 30 tablet 0      "MULTIPLE VITAMIN PO Take  by mouth. 2 childrens multi vitamins daily.        simvastatin (ZOCOR) 20 MG tablet Take 1 tablet (20 mg) by mouth At Bedtime 90 tablet 3     spironolactone (ALDACTONE) 25 MG tablet Take 1 tablet (25 mg) by mouth daily 90 tablet 3     travoprost Z, benzalkonium, (TRAVATAN) 0.004 % ophthalmic solution 1 drop every evening.       No Known Allergies  FAMILY HISTORY NOTED AND REVIEWED    REVIEW OF SYSTEMS: above    PHYSICAL EXAM    /74 (BP Location: Right arm, Cuff Size: Adult Regular)   Pulse 70   Temp 97.2  F (36.2  C) (Oral)   Ht 1.676 m (5' 6\")   Wt 71.2 kg (157 lb)   SpO2 100%   BMI 25.34 kg/m      Patient appears non toxic  Mouth - tongue midline and within normal limits, mucous membranes and posterior pharynx within normal limits, no lesions seen.  Neck - no masses, lesions or tenderness  Nodes - no supraclavicular, cervical or axially adenopathy .  Lungs - clear, normal flow  Cardiovascular - regular rate and rhythm, no murmer, rub or gallop, no jvp or edema, carotids within normal limits, no bruits.  Abdomen - normal active bowel sounds, soft, non tender, no masses, guarding or rebound, no hepatosplenomegaly  Feet within normal limits, no sores, normal pulses    Labs sent    ASSESSMENT:  1. Diabetes, no c/o, not on meds, follow up labs  2. Endurance and dyspnea on exertion, needs eval, labs today and est  3. Elevated cholesterol on statin  4. Hypertension, controlled  5. Vit d defic, follow up labs  6. neurop      PLAN:  Labs  Est echo  Call if changes  Follow up 6 months      Julio César Peres M.D.                  "

## 2019-03-16 NOTE — RESULT ENCOUNTER NOTE
It was a pleasure seeing you.  I wanted to get back to you with your test results.  I have enclosed a copy for your records.    For the most part your labs look good.  The only one I am worried about is the diabetes test or hemoglobin a1c.  It is 7.5 which is higher then 8 months ago.  Please be sure to exercise and eat a healthy diet to keep this down.    Your vitamin D level, blood count, urine, thyroid, chemistries and cholesterol are all fine.    If you have any questions please call me.

## 2019-03-22 ENCOUNTER — HOSPITAL ENCOUNTER (OUTPATIENT)
Dept: CARDIOLOGY | Facility: CLINIC | Age: 79
Discharge: HOME | End: 2019-03-22
Attending: INTERNAL MEDICINE | Admitting: INTERNAL MEDICINE
Payer: COMMERCIAL

## 2019-03-22 DIAGNOSIS — Z74.09 DECREASED FUNCTIONAL MOBILITY AND ENDURANCE: ICD-10-CM

## 2019-03-22 DIAGNOSIS — R06.09 DOE (DYSPNEA ON EXERTION): ICD-10-CM

## 2019-03-22 PROCEDURE — 25500064 ZZH RX 255 OP 636: Performed by: INTERNAL MEDICINE

## 2019-03-22 PROCEDURE — 40000264 ECHO STRESS ECHOCARDIOGRAM

## 2019-03-22 PROCEDURE — 93016 CV STRESS TEST SUPVJ ONLY: CPT | Performed by: INTERNAL MEDICINE

## 2019-03-22 PROCEDURE — 93018 CV STRESS TEST I&R ONLY: CPT | Performed by: INTERNAL MEDICINE

## 2019-03-22 PROCEDURE — 93325 DOPPLER ECHO COLOR FLOW MAPG: CPT | Mod: 26 | Performed by: INTERNAL MEDICINE

## 2019-03-22 PROCEDURE — 93321 DOPPLER ECHO F-UP/LMTD STD: CPT | Mod: 26 | Performed by: INTERNAL MEDICINE

## 2019-03-22 PROCEDURE — 93350 STRESS TTE ONLY: CPT | Mod: 26 | Performed by: INTERNAL MEDICINE

## 2019-03-22 RX ADMIN — HUMAN ALBUMIN MICROSPHERES AND PERFLUTREN 6 ML: 10; .22 INJECTION, SOLUTION INTRAVENOUS at 13:30

## 2019-03-22 NOTE — LETTER
[facility]  [address] [facility]  [address]  [place]  [address]  Tel: [phone]    2019    Ashley Liu  [address] [address]        Dear Ms. Liu,    As you likely know your stress test is normal.  Let me know if you have more chest symptoms or shortness of breath.    If you have any further questions or problems, please contact our office.      Sincerely,    Julio César Peres MD / kenny       Results for orders placed or performed during the hospital encounter of 19   Echo Stress Test with Definity    Narrative    [id number]  [id number]  [id number]  [id number]^GARY^JULIO CÉSAR^SUSANNA        [facility]  [facility]  [address]  [place] [address]  Phone [phone]  Fax [phone]        Name: ASHLEY LIU  MRN: [id number]  : 1940  Study Date: 2019 01:03 PM  Age: 78 yrs  Gender: Female  Patient Location: [facility]  Reason For Study: Decreased functional mobility and endurance, PAN (dyspnea on  exe  Ordering Physician: JULIO CÉSAR PERES  Referring Physician: JULIO CÉSAR PERES  Performed By: Vikki Vazquez     BSA: 1.8 m2  Height: 66 in  Weight: 157 lb  HR: 74  BP: 134/80 mmHg  _____________________________________________________________________________  __     Procedure  Stress Echo Complete. Contrast Optison.     _____________________________________________________________________________  __        Interpretation Summary  The patient exercised 6:00.  Exercise was stopped due to fatigue.  There was a normal BP response to exercise.  Target Heart Rate was achieved.  There was no chest pain or significant ST changes with exercise.  Normal resting wall motion and no stress-induced wall motion abnormality.  _____________________________________________________________________________  __  Stress  The patient exercised 6:00.  The patient exhibited no chest pain during exercise.  Exercise was stopped  due to fatigue.  There was a normal BP response to exercise.  A treadmill exercise test according to the Leobardo protocol was performed.  Target Heart Rate was achieved.  There was no chest pain or significant ST changes with exercise.  Arrhythmia induced during stress: occasional PVC's.  The visual ejection fraction is estimated at 65-70%.  Global LV systolic function augments with exercise.  Normal resting wall motion and no stress-induced wall motion abnormality.     Baseline  Normal baseline electrocardiogram.  Normal left ventricular function and wall motion at rest and post-stress.  The visual ejection fraction is estimated at 55-60%.  Trace MR, Mild TR.        Stress Results         Protocol:  Leobardo Protocol        Maximum Predicted HR:   144 bpm         Target HR: 122 bpm               % Maximum Predicted HR: 90 %               Stage  DurationHeart Rate  BP             Comment                   (mm:ss)   (bpm)           Stage 1   3:00     113    142/76           Stage 2   3:00     130    166/74RPP: 21.580; FAC: AVG; DUKE:6          RECOVERYR  4:00      85    138/70                Stress Duration:   6:00 mm:ss        Recovery Time: 4:00 mm:ss          Maximum Stress HR: 130 bpm           METS:          7  _____________________________________________________________________________  __           Doppler Measurements & Calculations  TR max romario: 234.9 cm/sec  TR max P.3 mmHg              _____________________________________________________________________________  __        Report approved by: Ton Mar 2019 02:18 PM

## 2019-03-24 NOTE — RESULT ENCOUNTER NOTE
As you likely know your stress test is normal.  Let me know if you have more chest symptoms or shortness of breath.

## 2019-03-29 ENCOUNTER — TELEPHONE (OUTPATIENT)
Dept: FAMILY MEDICINE | Facility: CLINIC | Age: 79
End: 2019-03-29

## 2019-03-29 NOTE — TELEPHONE ENCOUNTER
Reason for Call:  Other call back    Detailed comments: patient would like a nurse to call her to discuss whether or not she should schedule an OV with Dr. Peres after her recent stress test.    (patient would not provide any additional information).    Phone Number Patient can be reached at: Home number on file [phone] (home)    Best Time: anytime    Can we leave a detailed message on this number? YES    Call taken on 3/29/2019 at 2:46 PM by Ly Pulido  .

## 2019-03-29 NOTE — TELEPHONE ENCOUNTER
Placed call.  Information given to patient from PCP.  States understood and agreed with advise.  Ira Quarles RN

## 2019-06-07 ENCOUNTER — OFFICE VISIT (OUTPATIENT)
Dept: FAMILY MEDICINE | Facility: CLINIC | Age: 79
End: 2019-06-07
Payer: COMMERCIAL

## 2019-06-07 VITALS
HEART RATE: 80 BPM | WEIGHT: 154 LBS | TEMPERATURE: 98 F | HEIGHT: 66 IN | BODY MASS INDEX: 24.75 KG/M2 | SYSTOLIC BLOOD PRESSURE: 118 MMHG | OXYGEN SATURATION: 98 % | DIASTOLIC BLOOD PRESSURE: 76 MMHG

## 2019-06-07 DIAGNOSIS — B07.0 PLANTAR WARTS: ICD-10-CM

## 2019-06-07 DIAGNOSIS — N18.30 TYPE 2 DIABETES MELLITUS WITH STAGE 3 CHRONIC KIDNEY DISEASE, WITHOUT LONG-TERM CURRENT USE OF INSULIN (H): Primary | ICD-10-CM

## 2019-06-07 DIAGNOSIS — I10 ESSENTIAL HYPERTENSION, BENIGN: ICD-10-CM

## 2019-06-07 DIAGNOSIS — E11.22 TYPE 2 DIABETES MELLITUS WITH STAGE 3 CHRONIC KIDNEY DISEASE, WITHOUT LONG-TERM CURRENT USE OF INSULIN (H): Primary | ICD-10-CM

## 2019-06-07 PROCEDURE — 99214 OFFICE O/P EST MOD 30 MIN: CPT | Mod: 25 | Performed by: INTERNAL MEDICINE

## 2019-06-07 PROCEDURE — 17110 DESTRUCTION B9 LES UP TO 14: CPT | Performed by: INTERNAL MEDICINE

## 2019-06-07 ASSESSMENT — MIFFLIN-ST. JEOR: SCORE: 1205.29

## 2019-06-07 NOTE — PROGRESS NOTES
The patient presents for a few issues.    When I saw her in March she was having energy issues.  Stress test was negative and labs were all fine although her hemoglobin A1c was higher.  Since then she notes that her energy is better but still does not seem to have full energy.  For the most part she sleeps fine but some days she does not.  She is not having chest pain or shortness of breath or GI symptoms.  No fevers.    With respect to her diabetes she finds that her control is not as good as it used to be.  Lately the lowest is in the 120s and is often 1 50-1 70s.  There is been no diet change or weight change.    She was having pain in the right neck with some tingling and numbness but this is improved.  That was about 2 weeks ago.  No radiating pain or arm tingling or numbness or headaches or fevers.    The patient has a wart on the bottom of her left foot.    She otherwise is feeling well.  She has hypertension.  She takes her medications regularly.    Past Medical History:   Diagnosis Date     Anxiety     rare ativan use     Atypical chest pain 2013    neg est echo     Chest tightness 03/2019    neg est echo     CKD (chronic kidney disease) stage 3, GFR 30-59 ml/min (H)      Dizziness 2014    neg est echo     Glaucoma      Gout attack 6/29/15    elevated uric acid     HTN (hypertension)     stopped hctz 2015 due to gout     Hx of colonoscopy 2008, 2018    tics     Hypercholesteremia     added rx 12/12, aches with zocor, then added lipitor 2015     OA (osteoarthritis)      Peripheral neuropathy      Stroke (H) 2004    intimal dissection     Type II or unspecified type diabetes mellitus without mention of complication, not stated as uncontrolled     diet controlled     Vertigo 2004    hosp fsd, mri pos     Vitamin D deficiency 2013     Past Surgical History:   Procedure Laterality Date     COLONOSCOPY N/A 8/23/2018    Procedure: COLONOSCOPY;  colonoscopy;  Surgeon: Alexei Lea MD;  Location:  GI      ECTOPIC PREGNANCY SURGERY  80's     HYSTERECTOMY, PAP NO LONGER INDICATED  1980    fibriods and endometriosis, maggy and bso     Social History     Socioeconomic History     Marital status:      Spouse name: Not on file     Number of children: 2     Years of education: Not on file     Highest education level: Not on file   Occupational History     Occupation: retired, worked for [employer]   Social Needs     Financial resource strain: Not on file     Food insecurity:     Worry: Not on file     Inability: Not on file     Transportation needs:     Medical: Not on file     Non-medical: Not on file   Tobacco Use     Smoking status: Never Smoker     Smokeless tobacco: Never Used   Substance and Sexual Activity     Alcohol use: No     Alcohol/week: 0.0 oz     Drug use: No     Sexual activity: Not Currently   Lifestyle     Physical activity:     Days per week: Not on file     Minutes per session: Not on file     Stress: Not on file   Relationships     Social connections:     Talks on phone: Not on file     Gets together: Not on file     Attends Yazdanism service: Not on file     Active member of club or organization: Not on file     Attends meetings of clubs or organizations: Not on file     Relationship status: Not on file     Intimate partner violence:     Fear of current or ex partner: Not on file     Emotionally abused: Not on file     Physically abused: Not on file     Forced sexual activity: Not on file   Other Topics Concern     Parent/sibling w/ CABG, MI or angioplasty before 65F 55M? Not Asked   Social History Narrative     Not on file     Current Outpatient Medications   Medication Sig Dispense Refill     ACCU-CHEK DEIDRA PLUS test strip TEST ONCE DAILY 100 strip 1     ACCU-CHEK FASTCLIX LANCETS MISC 1 each daily. 1 Box 3     amLODIPine (NORVASC) 5 MG tablet Take 1 tablet (5 mg) by mouth daily 90 tablet 3     aspirin 325 MG EC tablet Take 1 tablet by mouth daily. 100 tablet 3     brimonidine (ALPHAGAN P) 0.1 %  "ophthalmic solution Place 1 drop into both eyes 2 times daily.       Cholecalciferol (VITAMIN D) 2000 UNITS CAPS Take 2,000 Units by mouth.       irbesartan (AVAPRO) 300 MG tablet Take 1 tablet (300 mg) by mouth At Bedtime 90 tablet 3     LORazepam (ATIVAN) 0.5 MG tablet Take 1 tablet (0.5 mg) by mouth daily as needed for anxiety 30 tablet 0     MULTIPLE VITAMIN PO Take  by mouth. 2 childrens multi vitamins daily.        simvastatin (ZOCOR) 20 MG tablet Take 1 tablet (20 mg) by mouth At Bedtime 90 tablet 3     spironolactone (ALDACTONE) 25 MG tablet Take 1 tablet (25 mg) by mouth daily 90 tablet 3     travoprost Z, benzalkonium, (TRAVATAN) 0.004 % ophthalmic solution 1 drop every evening.       No Known Allergies  FAMILY HISTORY NOTED AND REVIEWED    REVIEW OF SYSTEMS: above    PHYSICAL EXAM    /76 (BP Location: Left arm, Patient Position: Chair, Cuff Size: Adult Large)   Pulse 80   Temp 98  F (36.7  C) (Oral)   Ht 1.676 m (5' 6\")   Wt 69.9 kg (154 lb)   SpO2 98%   Breastfeeding? No   BMI 24.86 kg/m      Patient appears non toxic  Mouth - tongue midline and within normal limits, mucous membranes and posterior pharynx within normal limits, no lesions seen.  Neck - no masses, lesions or tenderness  Nodes - no supraclavicular, cervical or axially adenopathy .  Lungs - clear, normal flow  Cardiovascular - regular rate and rhythm, no murmer, rub or gallop, no jvp or edema, carotids within normal limits, no bruits.  Abdomen - normal active bowel sounds, soft, non tender, no masses, guarding or rebound, no hepatosplenomegaly  Has small plantar wart bottom left foot    Labs noted    ASSESSMENT:  1. Energy issues, suspect age, diabetes, could be aldactone, not sure, doubt cv, pulmonary embolis, tumor, not anemic, tsh fine  2. Plantar wart, frozen today  3. Diabetes, control not great, I recommended adding a diabetes medication but she does not want to at this point.  4. Hypertension, controlled    PLAN:  Liquid " nigtrogen applied today  Discontinue aldactone and call update 2 weeks  She will consider diabetes meds      Julio César Peres M.D.

## 2019-06-07 NOTE — PATIENT INSTRUCTIONS
Stop the spironolactone and call me in 2 weeks with an update and let me know if you feel better.    Julio César Peres M.D.

## 2019-06-21 ENCOUNTER — TELEPHONE (OUTPATIENT)
Dept: FAMILY MEDICINE | Facility: CLINIC | Age: 79
End: 2019-06-21

## 2019-06-21 NOTE — TELEPHONE ENCOUNTER
Reason for Call:  Other FYI    Detailed comments: patient called to let Dr. Peres know that since she has discontinued taking spironolactone (ALDACTONE) 25 MG tablet  She is feeling much better!    No need for call back unless additional questions/concerns.    Phone Number Patient can be reached at: Home number on file [phone] (home)    Best Time: anytime    Can we leave a detailed message on this number? YES    Call taken on 6/21/2019 at 9:02 AM by Ly Pulido  .

## 2019-07-15 ENCOUNTER — TELEPHONE (OUTPATIENT)
Dept: FAMILY MEDICINE | Facility: CLINIC | Age: 79
End: 2019-07-15

## 2019-07-15 NOTE — TELEPHONE ENCOUNTER
Per last OV notes pt was to try stopping Aldactone and call with an update in 2 weeks     Per pt's follow up call she was feeling better off this medication     Noted medication is still on pt's med list - should we discontinue off med list? Pt also asking if she needs an alternative med?     Sinai ROTH RN

## 2019-07-15 NOTE — TELEPHONE ENCOUNTER
Reason for Call:  Other     Detailed comments: Pt would like to know if Dr. Peres will be replacing the spironolactone (ALDACTONE) 25 MG tablet that was discontinued     Phone Number Patient can be reached at: Home number on file [phone] (home)    Best Time: any    Can we leave a detailed message on this number? YES    Call taken on 7/15/2019 at 11:25 AM by Renea Pearson

## 2019-07-17 ENCOUNTER — ALLIED HEALTH/NURSE VISIT (OUTPATIENT)
Dept: NURSING | Facility: CLINIC | Age: 79
End: 2019-07-17
Payer: COMMERCIAL

## 2019-07-17 VITALS — HEART RATE: 74 BPM | DIASTOLIC BLOOD PRESSURE: 76 MMHG | OXYGEN SATURATION: 98 % | SYSTOLIC BLOOD PRESSURE: 152 MMHG

## 2019-07-17 DIAGNOSIS — I10 ESSENTIAL HYPERTENSION, BENIGN: Primary | ICD-10-CM

## 2019-07-17 PROCEDURE — 99207 ZZC NO CHARGE NURSE ONLY: CPT

## 2019-07-17 NOTE — Clinical Note
Ashley Liu is a 78 year old patient who comes in today for a Blood Pressure check.Initial BP:  /76 (BP Location: Right arm, Cuff Size: Adult Regular)   Pulse 74   SpO2 98%    74Disposition: follow-up as previously indicated by provider and results routed to providerBP was taken 2 times.  First reading was 145/78.  Please let her know if any changes need to be made or if she should schedule to see you soon. Sharon Arnold MA

## 2019-07-18 NOTE — PROGRESS NOTES
Ashley Liu is a 78 year old patient who comes in today for a Blood Pressure check.  Initial BP:  /76 (BP Location: Right arm, Cuff Size: Adult Regular)   Pulse 74   SpO2 98%      74  Disposition: follow-up as previously indicated by provider and results routed to provider      BP was taken 2 times.  First reading was 145/78.  Please let her know if any changes need to be made or if she should schedule to see you soon.     Sharon Arnold MA

## 2019-07-24 ENCOUNTER — ALLIED HEALTH/NURSE VISIT (OUTPATIENT)
Dept: NURSING | Facility: CLINIC | Age: 79
End: 2019-07-24
Payer: COMMERCIAL

## 2019-07-24 VITALS — SYSTOLIC BLOOD PRESSURE: 128 MMHG | DIASTOLIC BLOOD PRESSURE: 76 MMHG | HEART RATE: 76 BPM

## 2019-07-24 DIAGNOSIS — I10 ESSENTIAL HYPERTENSION, BENIGN: Primary | ICD-10-CM

## 2019-07-24 PROCEDURE — 99207 ZZC NO CHARGE NURSE ONLY: CPT

## 2019-07-24 NOTE — PROGRESS NOTES
Ashley Liu is a 78 year old patient who comes in today for a Blood Pressure check.  Initial BP:  /76 (BP Location: Right arm, Patient Position: Sitting, Cuff Size: Adult Regular)   Pulse 76      76  Disposition: results routed to provider    Leo Mckinley CMA on 7/24/2019 at 9:39 AM

## 2019-07-24 NOTE — Clinical Note
Ashley Liu is a 78 year old patient who comes in today for a Blood Pressure check.Initial BP:  /76 (BP Location: Right arm, Patient Position: Sitting, Cuff Size: Adult Regular)   Pulse 76    76Disposition: results routed to provider

## 2019-08-01 ENCOUNTER — TRANSFERRED RECORDS (OUTPATIENT)
Dept: HEALTH INFORMATION MANAGEMENT | Facility: CLINIC | Age: 79
End: 2019-08-01

## 2019-08-21 DIAGNOSIS — E11.9 TYPE 2 DIABETES MELLITUS WITHOUT COMPLICATIONS (H): ICD-10-CM

## 2019-08-21 RX ORDER — BLOOD SUGAR DIAGNOSTIC
STRIP MISCELLANEOUS
Qty: 100 STRIP | Refills: 1 | Status: SHIPPED | OUTPATIENT
Start: 2019-08-21 | End: 2020-03-04

## 2019-08-21 NOTE — TELEPHONE ENCOUNTER
Prescription approved per [facility] Refill Protocol.  Sinai ROTH RN

## 2019-08-21 NOTE — TELEPHONE ENCOUNTER
"Pending Prescriptions:                       Disp   Refills    ACCU-CHEK DEIDRA PLUS test strip [Pharmacy*50 str*3            Sig: TEST ONCE DAILY AS DIRECTED    Last Written Prescription Date:  1/25/19  Last Fill Quantity: 100,  # refills: 1   Last office visit: 6/7/2019 with prescribing provider:     Future Office Visit:   Next 5 appointments (look out 90 days)    Sep 16, 2019 10:00 AM CDT  Office Visit with Julio César Peres MD  [facility] [address] [facility] [address]  [phone]         Requested Prescriptions   Pending Prescriptions Disp Refills     ACCU-CHEK DEIDRA PLUS test strip [Pharmacy Med Name: ACCU-CHEK DEIDRA PLUS TEST STRP] 50 strip 3     Sig: TEST ONCE DAILY AS DIRECTED       Diabetic Supplies Protocol Passed - 8/21/2019  1:01 AM        Passed - Medication is active on med list        Passed - Patient is 18 years of age or older        Passed - Recent (6 mo) or future (30 days) visit within the authorizing provider's specialty     Patient had office visit in the last 6 months or has a visit in the next 30 days with authorizing provider.  See \"Patient Info\" tab in inbasket, or \"Choose Columns\" in Meds & Orders section of the refill encounter.              "

## 2019-09-10 ENCOUNTER — TELEPHONE (OUTPATIENT)
Dept: FAMILY MEDICINE | Facility: CLINIC | Age: 79
End: 2019-09-10

## 2019-09-10 DIAGNOSIS — M79.675 TOE PAIN, LEFT: ICD-10-CM

## 2019-09-10 DIAGNOSIS — M10.9 GOUT ATTACK: ICD-10-CM

## 2019-09-10 RX ORDER — COLCHICINE 0.6 MG/1
TABLET ORAL
Qty: 20 TABLET | Refills: 1 | Status: SHIPPED | OUTPATIENT
Start: 2019-09-10 | End: 2020-10-06

## 2019-09-10 NOTE — TELEPHONE ENCOUNTER
PCP,    Pt states she's having a gout flare up and would like an Rx for this. Last OV 6/7/19.  Pended Colchcine (last Rx 2015)    Please review and authorize if appropriate.    Thank you,   Tima DODGE RN

## 2019-09-10 NOTE — TELEPHONE ENCOUNTER
Reason for Call:  Medication or medication refill:    Do you use a [facility]?  Name of the pharmacy and phone number for the current request:   [facility] [id number] [facility], [address]    Name of the medication requested:   Gout medication     Other request: Pt called and is requesting a RX for the gout medication that Dr. Peres has given her in the past as she is having a flare up.     Can we leave a detailed message on this number? YES    Phone number patient can be reached at: Cell number on file:    Telephone Information:   Mobile [phone]       Best Time: Any    Call taken on 9/10/2019 at 10:16 AM by Karis Engel

## 2019-09-10 NOTE — TELEPHONE ENCOUNTER
"Patient states no trauma; this is her \"typical\" gout pain.  Awakened during noc with pain, swelling, redness.    Has appointment Monday, 9/16/19 which will be a good opportunity to check her progress.  Call sooner if not improving.    Jojo Aguilar RN on 9/10/2019 at 1:51 PM    "

## 2019-09-16 ENCOUNTER — OFFICE VISIT (OUTPATIENT)
Dept: FAMILY MEDICINE | Facility: CLINIC | Age: 79
End: 2019-09-16
Payer: COMMERCIAL

## 2019-09-16 VITALS
DIASTOLIC BLOOD PRESSURE: 84 MMHG | BODY MASS INDEX: 25.66 KG/M2 | OXYGEN SATURATION: 95 % | HEART RATE: 76 BPM | TEMPERATURE: 97.8 F | SYSTOLIC BLOOD PRESSURE: 153 MMHG | WEIGHT: 159 LBS

## 2019-09-16 DIAGNOSIS — E11.22 TYPE 2 DIABETES MELLITUS WITH STAGE 3 CHRONIC KIDNEY DISEASE, WITHOUT LONG-TERM CURRENT USE OF INSULIN (H): Primary | ICD-10-CM

## 2019-09-16 DIAGNOSIS — M10.9 ACUTE GOUT, UNSPECIFIED CAUSE, UNSPECIFIED SITE: ICD-10-CM

## 2019-09-16 DIAGNOSIS — E78.5 HYPERLIPIDEMIA LDL GOAL <100: ICD-10-CM

## 2019-09-16 DIAGNOSIS — I10 ESSENTIAL HYPERTENSION, BENIGN: ICD-10-CM

## 2019-09-16 DIAGNOSIS — N18.30 CKD (CHRONIC KIDNEY DISEASE) STAGE 3, GFR 30-59 ML/MIN (H): ICD-10-CM

## 2019-09-16 DIAGNOSIS — F41.9 ANXIETY: ICD-10-CM

## 2019-09-16 DIAGNOSIS — N18.30 TYPE 2 DIABETES MELLITUS WITH STAGE 3 CHRONIC KIDNEY DISEASE, WITHOUT LONG-TERM CURRENT USE OF INSULIN (H): Primary | ICD-10-CM

## 2019-09-16 LAB
ANION GAP SERPL CALCULATED.3IONS-SCNC: 5 MMOL/L (ref 3–14)
BUN SERPL-MCNC: 18 MG/DL (ref 7–30)
CALCIUM SERPL-MCNC: 9.6 MG/DL (ref 8.5–10.1)
CHLORIDE SERPL-SCNC: 105 MMOL/L (ref 94–109)
CHOLEST SERPL-MCNC: 155 MG/DL
CO2 SERPL-SCNC: 27 MMOL/L (ref 20–32)
CREAT SERPL-MCNC: 0.95 MG/DL (ref 0.52–1.04)
GFR SERPL CREATININE-BSD FRML MDRD: 58 ML/MIN/{1.73_M2}
GLUCOSE SERPL-MCNC: 173 MG/DL (ref 70–99)
HBA1C MFR BLD: 7.9 % (ref 0–5.6)
HDLC SERPL-MCNC: 56 MG/DL
LDLC SERPL CALC-MCNC: 75 MG/DL
NONHDLC SERPL-MCNC: 99 MG/DL
POTASSIUM SERPL-SCNC: 4.4 MMOL/L (ref 3.4–5.3)
SODIUM SERPL-SCNC: 137 MMOL/L (ref 133–144)
TRIGL SERPL-MCNC: 122 MG/DL

## 2019-09-16 PROCEDURE — 36415 COLL VENOUS BLD VENIPUNCTURE: CPT | Performed by: INTERNAL MEDICINE

## 2019-09-16 PROCEDURE — 80048 BASIC METABOLIC PNL TOTAL CA: CPT | Performed by: INTERNAL MEDICINE

## 2019-09-16 PROCEDURE — 83036 HEMOGLOBIN GLYCOSYLATED A1C: CPT | Performed by: INTERNAL MEDICINE

## 2019-09-16 PROCEDURE — 99214 OFFICE O/P EST MOD 30 MIN: CPT | Performed by: INTERNAL MEDICINE

## 2019-09-16 PROCEDURE — 80061 LIPID PANEL: CPT | Performed by: INTERNAL MEDICINE

## 2019-09-16 PROCEDURE — 99207 C FOOT EXAM  NO CHARGE: CPT | Performed by: INTERNAL MEDICINE

## 2019-09-16 RX ORDER — LORAZEPAM 0.5 MG/1
0.5 TABLET ORAL DAILY PRN
Qty: 30 TABLET | Refills: 0 | Status: SHIPPED | OUTPATIENT
Start: 2019-09-16 | End: 2020-12-15

## 2019-09-16 RX ORDER — AMLODIPINE BESYLATE 5 MG/1
5 TABLET ORAL DAILY
Qty: 90 TABLET | Refills: 3 | Status: SHIPPED | OUTPATIENT
Start: 2019-09-16 | End: 2020-09-22

## 2019-09-16 RX ORDER — IRBESARTAN 300 MG/1
300 TABLET ORAL AT BEDTIME
Qty: 90 TABLET | Refills: 3 | Status: SHIPPED | OUTPATIENT
Start: 2019-09-16 | End: 2020-09-23

## 2019-09-16 RX ORDER — SIMVASTATIN 20 MG
20 TABLET ORAL AT BEDTIME
Qty: 90 TABLET | Refills: 3 | Status: SHIPPED | OUTPATIENT
Start: 2019-09-16 | End: 2020-12-01

## 2019-09-16 NOTE — PROGRESS NOTES
The patient presents for follow-up on multiple medical problems.    Patient has diabetes and her sugars have been staying higher than normal at 1 50-1 70.  Her diet has not changed.  Her weight is up a bit.    Her energy is better since we stopped the Spironolactone.  Her blood pressure today is high.    The patient had a recent gout attack of the left great toe which is the second 1 she is had.  Her uric acid was elevated.  It has resolved with colchicine.    The patient has anxiety and would like a refill on her lorazepam.  She does not use it very often.    The patient has CKD and hyperlipidemia.    She otherwise is felt well.  No chest pain or breathing difficulty.  No GI symptoms.  She does not want a flu shot    Past Medical History:   Diagnosis Date     Anxiety     rare ativan use     Atypical chest pain 2013    neg est echo     Chest tightness 03/2019    neg est echo     CKD (chronic kidney disease) stage 3, GFR 30-59 ml/min (H)      Dizziness 2014    neg est echo     Glaucoma      Gout attack 06/29/2015, 9/19    elevated uric acid     HTN (hypertension)     stopped hctz 2015 due to gout     Hx of colonoscopy 2008, 2018    tics     Hypercholesteremia     added rx 12/12, aches with zocor, then added lipitor 2015     OA (osteoarthritis)      Peripheral neuropathy      Stroke (H) 2004    intimal dissection     Type II or unspecified type diabetes mellitus without mention of complication, not stated as uncontrolled     diet controlled     Vertigo 2004    hosp fsd, mri pos     Vitamin D deficiency 2013     Past Surgical History:   Procedure Laterality Date     COLONOSCOPY N/A 8/23/2018    Procedure: COLONOSCOPY;  colonoscopy;  Surgeon: Alexei Lea MD;  Location:  GI     ECTOPIC PREGNANCY SURGERY  80's     HYSTERECTOMY, PAP NO LONGER INDICATED  1980    fibriods and endometriosis, maggy and bso     Social History     Socioeconomic History     Marital status:      Spouse name: Not on file      Number of children: 2     Years of education: Not on file     Highest education level: Not on file   Occupational History     Occupation: retired, worked for [employer]   Social Needs     Financial resource strain: Not on file     Food insecurity:     Worry: Not on file     Inability: Not on file     Transportation needs:     Medical: Not on file     Non-medical: Not on file   Tobacco Use     Smoking status: Never Smoker     Smokeless tobacco: Never Used   Substance and Sexual Activity     Alcohol use: No     Alcohol/week: 0.0 oz     Drug use: No     Sexual activity: Not Currently   Lifestyle     Physical activity:     Days per week: Not on file     Minutes per session: Not on file     Stress: Not on file   Relationships     Social connections:     Talks on phone: Not on file     Gets together: Not on file     Attends Jew service: Not on file     Active member of club or organization: Not on file     Attends meetings of clubs or organizations: Not on file     Relationship status: Not on file     Intimate partner violence:     Fear of current or ex partner: Not on file     Emotionally abused: Not on file     Physically abused: Not on file     Forced sexual activity: Not on file   Other Topics Concern     Parent/sibling w/ CABG, MI or angioplasty before 65F 55M? Not Asked   Social History Narrative     Not on file     Current Outpatient Medications   Medication Sig Dispense Refill     ACCU-CHEK DEIDRA PLUS test strip TEST ONCE DAILY AS DIRECTED 100 strip 1     ACCU-CHEK FASTCLIX LANCETS MISC 1 each daily. 1 Box 3     amLODIPine (NORVASC) 5 MG tablet Take 1 tablet (5 mg) by mouth daily 90 tablet 3     aspirin 325 MG EC tablet Take 1 tablet by mouth daily. 100 tablet 3     brimonidine (ALPHAGAN P) 0.1 % ophthalmic solution Place 1 drop into both eyes 2 times daily.       Cholecalciferol (VITAMIN D) 2000 UNITS CAPS Take 2,000 Units by mouth.       colchicine (COLCRYS) 0.6 MG tablet Take 1 tablet daily prn gout attack 20  tablet 1     irbesartan (AVAPRO) 300 MG tablet Take 1 tablet (300 mg) by mouth At Bedtime 90 tablet 3     LORazepam (ATIVAN) 0.5 MG tablet Take 1 tablet (0.5 mg) by mouth daily as needed for anxiety 30 tablet 0     MULTIPLE VITAMIN PO Take  by mouth. 2 childrens multi vitamins daily.        simvastatin (ZOCOR) 20 MG tablet Take 1 tablet (20 mg) by mouth At Bedtime 90 tablet 3     travoprost Z, benzalkonium, (TRAVATAN) 0.004 % ophthalmic solution 1 drop every evening.       No Known Allergies  FAMILY HISTORY NOTED AND REVIEWED    REVIEW OF SYSTEMS: above    PHYSICAL EXAM    BP (!) 153/84 (BP Location: Left arm, Cuff Size: Adult Regular)   Pulse 76   Temp 97.8  F (36.6  C) (Oral)   Wt 72.1 kg (159 lb)   SpO2 95%   BMI 25.66 kg/m      Patient appears non toxic  Past Medical History:   Diagnosis Date     Anxiety     rare ativan use     Atypical chest pain 2013    neg est echo     Chest tightness 03/2019    neg est echo     CKD (chronic kidney disease) stage 3, GFR 30-59 ml/min (H)      Dizziness 2014    neg est echo     Glaucoma      Gout attack 06/29/2015, 9/19    elevated uric acid     HTN (hypertension)     stopped hctz 2015 due to gout     Hx of colonoscopy 2008, 2018    tics     Hypercholesteremia     added rx 12/12, aches with zocor, then added lipitor 2015     OA (osteoarthritis)      Peripheral neuropathy      Stroke (H) 2004    intimal dissection     Type II or unspecified type diabetes mellitus without mention of complication, not stated as uncontrolled     diet controlled     Vertigo 2004    hosp fsd, mri pos     Vitamin D deficiency 2013     Past Surgical History:   Procedure Laterality Date     COLONOSCOPY N/A 8/23/2018    Procedure: COLONOSCOPY;  colonoscopy;  Surgeon: Alexei Lea MD;  Location:  GI     ECTOPIC PREGNANCY SURGERY  80's     HYSTERECTOMY, PAP NO LONGER INDICATED  1980    fibriods and endometriosis, maggy and bso     Social History     Socioeconomic History     Marital status:       Spouse name: Not on file     Number of children: 2     Years of education: Not on file     Highest education level: Not on file   Occupational History     Occupation: retired, worked for [employer]   Social Needs     Financial resource strain: Not on file     Food insecurity:     Worry: Not on file     Inability: Not on file     Transportation needs:     Medical: Not on file     Non-medical: Not on file   Tobacco Use     Smoking status: Never Smoker     Smokeless tobacco: Never Used   Substance and Sexual Activity     Alcohol use: No     Alcohol/week: 0.0 oz     Drug use: No     Sexual activity: Not Currently   Lifestyle     Physical activity:     Days per week: Not on file     Minutes per session: Not on file     Stress: Not on file   Relationships     Social connections:     Talks on phone: Not on file     Gets together: Not on file     Attends Gnosticism service: Not on file     Active member of club or organization: Not on file     Attends meetings of clubs or organizations: Not on file     Relationship status: Not on file     Intimate partner violence:     Fear of current or ex partner: Not on file     Emotionally abused: Not on file     Physically abused: Not on file     Forced sexual activity: Not on file   Other Topics Concern     Parent/sibling w/ CABG, MI or angioplasty before 65F 55M? Not Asked   Social History Narrative     Not on file     Current Outpatient Medications   Medication Sig Dispense Refill     ACCU-CHEK DEIDRA PLUS test strip TEST ONCE DAILY AS DIRECTED 100 strip 1     ACCU-CHEK FASTCLIX LANCETS MISC 1 each daily. 1 Box 3     amLODIPine (NORVASC) 5 MG tablet Take 1 tablet (5 mg) by mouth daily 90 tablet 3     aspirin 325 MG EC tablet Take 1 tablet by mouth daily. 100 tablet 3     brimonidine (ALPHAGAN P) 0.1 % ophthalmic solution Place 1 drop into both eyes 2 times daily.       Cholecalciferol (VITAMIN D) 2000 UNITS CAPS Take 2,000 Units by mouth.       colchicine (COLCRYS) 0.6 MG tablet  Take 1 tablet daily prn gout attack 20 tablet 1     irbesartan (AVAPRO) 300 MG tablet Take 1 tablet (300 mg) by mouth At Bedtime 90 tablet 3     LORazepam (ATIVAN) 0.5 MG tablet Take 1 tablet (0.5 mg) by mouth daily as needed for anxiety 30 tablet 0     MULTIPLE VITAMIN PO Take  by mouth. 2 childrens multi vitamins daily.        simvastatin (ZOCOR) 20 MG tablet Take 1 tablet (20 mg) by mouth At Bedtime 90 tablet 3     travoprost Z, benzalkonium, (TRAVATAN) 0.004 % ophthalmic solution 1 drop every evening.       No Known Allergies  FAMILY HISTORY NOTED AND REVIEWED    REVIEW OF SYSTEMS: above    PHYSICAL EXAM    BP (!) 153/84 (BP Location: Left arm, Cuff Size: Adult Regular)   Pulse 76   Temp 97.8  F (36.6  C) (Oral)   Wt 72.1 kg (159 lb)   SpO2 95%   BMI 25.66 kg/m      Patient appears non toxic  Mouth - tongue midline and within normal limits, mucous membranes and posterior pharynx within normal limits, no lesions seen.  Neck - no masses, lesions or tenderness  Nodes - no supraclavicular, cervical or axially adenopathy .  Lungs - clear, normal flow  Cardiovascular - regular rate and rhythm, no murmer, rub or gallop, no jvp or edema, carotids within normal limits, no bruits.  Abdomen - normal active bowel sounds, soft, non tender, no masses, guarding or rebound, no hepatosplenomegaly  Feet within normal limits, normal pulses, toes fine, no sores    Labs sent    ASSESSMENT:  1. Diabetes, control not great, she has been hesitant to go on medication which she is never been on in the past.  I described metformin to her and she will consider it.  2. Gout, resolved, if more often consider allopurinol  3. Elevated cholesterol on statin  4. Hypertension, ?control  5. Ckd, follow up labs  6. anxiety    PLAN:  Refilled lorazepam  Labs and consider metformin  Follow up blood pressure check 4 weeks  Office visit 4 months      Julio César Peres M.D.    a1c noted, will add metformin    Julio César Peres M.D.

## 2019-09-17 RX ORDER — METFORMIN HCL 500 MG
1000 TABLET, EXTENDED RELEASE 24 HR ORAL
Qty: 90 TABLET | Refills: 3 | Status: SHIPPED | OUTPATIENT
Start: 2019-09-17 | End: 2020-03-05

## 2019-09-17 NOTE — RESULT ENCOUNTER NOTE
It was a please seeing you.  You should be able to view your labs.    Your blood salts and kidney tests look very good.  Your cholesterol is excellent as well as you can see.    Your diabetes test however is not very good, the hemoglobin A1c.  This indicates elevated sugars for the last 3 months.  As we discussed I would like to start you on metformin.  It is very good and safe medication.  Some people have some GI side effects such as stomach discomfort or diarrhea but if that were to be the case we could always change it.  I will send this into your pharmacy at a low dose.  Please start this and if you have any problems with it let me know.  I would like to see you back in 3 months.    If you have any questions please let me know.    Julio César Peres M.D.

## 2019-10-02 ENCOUNTER — HEALTH MAINTENANCE LETTER (OUTPATIENT)
Age: 79
End: 2019-10-02

## 2019-10-15 ENCOUNTER — ALLIED HEALTH/NURSE VISIT (OUTPATIENT)
Dept: NURSING | Facility: CLINIC | Age: 79
End: 2019-10-15
Payer: COMMERCIAL

## 2019-10-15 VITALS — SYSTOLIC BLOOD PRESSURE: 134 MMHG | OXYGEN SATURATION: 100 % | DIASTOLIC BLOOD PRESSURE: 69 MMHG | HEART RATE: 68 BPM

## 2019-10-15 DIAGNOSIS — I10 ESSENTIAL HYPERTENSION, BENIGN: Primary | ICD-10-CM

## 2019-10-15 PROCEDURE — 99207 ZZC NO CHARGE NURSE ONLY: CPT

## 2019-10-15 NOTE — NURSING NOTE
Ashley Liu is a 78 year old patient who comes in today for a Blood Pressure check.  Initial BP:  /69 (BP Location: Right arm, Patient Position: Chair, Cuff Size: Adult Regular)   Pulse 68   SpO2 100%      68  Disposition: provider notified while patient in the clinic  Kayli Box CMA

## 2019-10-28 ENCOUNTER — TRANSFERRED RECORDS (OUTPATIENT)
Dept: HEALTH INFORMATION MANAGEMENT | Facility: CLINIC | Age: 79
End: 2019-10-28

## 2019-11-20 ENCOUNTER — TRANSFERRED RECORDS (OUTPATIENT)
Dept: HEALTH INFORMATION MANAGEMENT | Facility: CLINIC | Age: 79
End: 2019-11-20

## 2019-11-25 ENCOUNTER — TRANSFERRED RECORDS (OUTPATIENT)
Dept: HEALTH INFORMATION MANAGEMENT | Facility: CLINIC | Age: 79
End: 2019-11-25

## 2019-12-16 ENCOUNTER — HEALTH MAINTENANCE LETTER (OUTPATIENT)
Age: 79
End: 2019-12-16

## 2020-01-17 ENCOUNTER — OFFICE VISIT (OUTPATIENT)
Dept: FAMILY MEDICINE | Facility: CLINIC | Age: 80
End: 2020-01-17
Payer: COMMERCIAL

## 2020-01-17 VITALS
DIASTOLIC BLOOD PRESSURE: 76 MMHG | WEIGHT: 154.7 LBS | HEIGHT: 66 IN | OXYGEN SATURATION: 100 % | SYSTOLIC BLOOD PRESSURE: 138 MMHG | BODY MASS INDEX: 24.86 KG/M2 | HEART RATE: 63 BPM | TEMPERATURE: 97.7 F

## 2020-01-17 DIAGNOSIS — I63.9 CEREBROVASCULAR ACCIDENT (CVA), UNSPECIFIED MECHANISM (H): ICD-10-CM

## 2020-01-17 DIAGNOSIS — N18.30 CKD (CHRONIC KIDNEY DISEASE) STAGE 3, GFR 30-59 ML/MIN (H): Primary | ICD-10-CM

## 2020-01-17 DIAGNOSIS — E78.5 HYPERLIPIDEMIA LDL GOAL <100: ICD-10-CM

## 2020-01-17 DIAGNOSIS — N18.30 TYPE 2 DIABETES MELLITUS WITH STAGE 3 CHRONIC KIDNEY DISEASE, WITHOUT LONG-TERM CURRENT USE OF INSULIN (H): ICD-10-CM

## 2020-01-17 DIAGNOSIS — I10 ESSENTIAL HYPERTENSION, BENIGN: ICD-10-CM

## 2020-01-17 DIAGNOSIS — E11.22 TYPE 2 DIABETES MELLITUS WITH STAGE 3 CHRONIC KIDNEY DISEASE, WITHOUT LONG-TERM CURRENT USE OF INSULIN (H): ICD-10-CM

## 2020-01-17 LAB — HBA1C MFR BLD: 6 % (ref 0–5.6)

## 2020-01-17 PROCEDURE — 36415 COLL VENOUS BLD VENIPUNCTURE: CPT | Performed by: INTERNAL MEDICINE

## 2020-01-17 PROCEDURE — 83036 HEMOGLOBIN GLYCOSYLATED A1C: CPT | Performed by: INTERNAL MEDICINE

## 2020-01-17 PROCEDURE — 99214 OFFICE O/P EST MOD 30 MIN: CPT | Performed by: INTERNAL MEDICINE

## 2020-01-17 ASSESSMENT — MIFFLIN-ST. JEOR: SCORE: 1203.46

## 2020-01-17 NOTE — RESULT ENCOUNTER NOTE
Great news, your a1c is 6.0.    I forgot to mention when you were here that simvastatin can certainly cause some of the aches.  The only way to find out for sure would be to have you stop it for 3 weeks.  If you feel the aches are significant then I would have you stop it and we can follow-up at your next office visit.  If they are fairly minor then we can continue it.    Congratulations on the A1c.     Julio César Peres M.D.

## 2020-01-17 NOTE — PROGRESS NOTES
The patient presents for follow-up to multiple medical problems.    The patient has diabetes.  I added metformin last office visit in September and she has had no side effects with it.  She has no sores on her feet or toes.  She checks her sugars in the morning and they have been much better ranging from .    The patient has hypertension.  Her second blood pressure reading was better.  She had been on spironolactone but had side effects with that.    The patient has prior gout but has had no recent episodes.    The patient has high cholesterol for which she takes simvastatin.  She does have some aching with it.    The patient has anxiety which is under control.  She has CKD and her labs have been stable.  She has had a prior stroke but no recent neurologic symptoms.  In general she feels well.    Past Medical History:   Diagnosis Date     Anxiety     rare ativan use     Atypical chest pain 2013    neg est echo     Chest tightness 03/2019    neg est echo     CKD (chronic kidney disease) stage 3, GFR 30-59 ml/min (H)      Dizziness 2014    neg est echo     Glaucoma      Gout attack 06/29/2015, 9/19    elevated uric acid     HTN (hypertension)     stopped hctz 2015 due to gout     Hx of colonoscopy 2008, 2018    tics     Hypercholesteremia     added rx 12/12, aches with zocor, then added lipitor 2015     OA (osteoarthritis)      Peripheral neuropathy      Stroke (H) 2004    intimal dissection     Type II or unspecified type diabetes mellitus without mention of complication, not stated as uncontrolled     diet controlled; added metformin 9/16/19     Vertigo 2004    hosp fsd, mri pos     Vitamin D deficiency 2013     Past Surgical History:   Procedure Laterality Date     COLONOSCOPY N/A 8/23/2018    Procedure: COLONOSCOPY;  colonoscopy;  Surgeon: Alexei Lea MD;  Location:  GI     ECTOPIC PREGNANCY SURGERY  80's     HYSTERECTOMY, PAP NO LONGER INDICATED  1980    fibriods and endometriosis, maggy and bso      Social History     Socioeconomic History     Marital status:      Spouse name: Not on file     Number of children: 2     Years of education: Not on file     Highest education level: Not on file   Occupational History     Occupation: retired, worked for [employer]   Social Needs     Financial resource strain: Not on file     Food insecurity:     Worry: Not on file     Inability: Not on file     Transportation needs:     Medical: Not on file     Non-medical: Not on file   Tobacco Use     Smoking status: Never Smoker     Smokeless tobacco: Never Used   Substance and Sexual Activity     Alcohol use: No     Alcohol/week: 0.0 standard drinks     Drug use: No     Sexual activity: Not Currently   Lifestyle     Physical activity:     Days per week: Not on file     Minutes per session: Not on file     Stress: Not on file   Relationships     Social connections:     Talks on phone: Not on file     Gets together: Not on file     Attends Uatsdin service: Not on file     Active member of club or organization: Not on file     Attends meetings of clubs or organizations: Not on file     Relationship status: Not on file     Intimate partner violence:     Fear of current or ex partner: Not on file     Emotionally abused: Not on file     Physically abused: Not on file     Forced sexual activity: Not on file   Other Topics Concern     Parent/sibling w/ CABG, MI or angioplasty before 65F 55M? Not Asked   Social History Narrative     Not on file     Current Outpatient Medications   Medication Sig Dispense Refill     ACCU-CHEK DEIDRA PLUS test strip TEST ONCE DAILY AS DIRECTED 100 strip 1     ACCU-CHEK FASTCLIX LANCETS MISC 1 each daily. 1 Box 3     amLODIPine (NORVASC) 5 MG tablet Take 1 tablet (5 mg) by mouth daily 90 tablet 3     aspirin 325 MG EC tablet Take 1 tablet by mouth daily. 100 tablet 3     brimonidine (ALPHAGAN P) 0.1 % ophthalmic solution Place 1 drop into both eyes 2 times daily.       Cholecalciferol (VITAMIN D) 2000  "UNITS CAPS Take 2,000 Units by mouth.       colchicine (COLCRYS) 0.6 MG tablet Take 1 tablet daily prn gout attack 20 tablet 1     irbesartan (AVAPRO) 300 MG tablet Take 1 tablet (300 mg) by mouth At Bedtime 90 tablet 3     LORazepam (ATIVAN) 0.5 MG tablet Take 1 tablet (0.5 mg) by mouth daily as needed for anxiety 30 tablet 0     metFORMIN (GLUCOPHAGE-XR) 500 MG 24 hr tablet Take 2 tablets (1,000 mg) by mouth daily (with dinner) 90 tablet 3     MULTIPLE VITAMIN PO Take  by mouth. 2 childrens multi vitamins daily.        simvastatin (ZOCOR) 20 MG tablet Take 1 tablet (20 mg) by mouth At Bedtime 90 tablet 3     travoprost Z, benzalkonium, (TRAVATAN) 0.004 % ophthalmic solution 1 drop every evening.       No Known Allergies  FAMILY HISTORY NOTED AND REVIEWED    REVIEW OF SYSTEMS: above    PHYSICAL EXAM    /76   Pulse 63   Temp 97.7  F (36.5  C) (Tympanic)   Ht 1.676 m (5' 6\")   Wt 70.2 kg (154 lb 11.2 oz)   SpO2 100%   BMI 24.97 kg/m       Patient appears non toxic  Mouth - tongue midline and within normal limits, mucous membranes and posterior pharynx within normal limits, no lesions seen.  Neck - no masses, lesions or tenderness  Nodes - no supraclavicular, cervical or axially adenopathy .  Lungs - clear, normal flow  Cardiovascular - regular rate and rhythm, no murmer, rub or gallop, no jvp or edema, carotids within normal limits, no bruits.  Abdomen - normal active bowel sounds, soft, non tender, no masses, guarding or rebound, no hepatosplenomegaly      Labs sent    ASSESSMENT:  1. Diabetes, no c/o, on metformin as of 9/19, follow up lab today  2. Hypertension, controlled  3. Ckd, has been stabld  4. Elevated cholesterol on statin  5. Anxiety, controlled  6. Cvi, med mgmt    PLAN:  Hemoglobin a1c  Follow up 4 months  Call if problems      Julio César Peres M.D.                  "

## 2020-03-04 DIAGNOSIS — E11.9 TYPE 2 DIABETES MELLITUS WITHOUT COMPLICATIONS (H): ICD-10-CM

## 2020-03-04 RX ORDER — BLOOD SUGAR DIAGNOSTIC
STRIP MISCELLANEOUS
Qty: 100 STRIP | Refills: 0 | Status: SHIPPED | OUTPATIENT
Start: 2020-03-04 | End: 2020-06-08

## 2020-03-04 NOTE — TELEPHONE ENCOUNTER
"Last Written Prescription Date:  8/21/19  Last Fill Quantity: 100 strip,  # refills: 1   Last office visit: 1/17/2020 with prescribing provider:  Larisa   Future Office Visit:   Next 5 appointments (look out 90 days)    May 19, 2020 10:00 AM CDT  Office Visit with Julio César Peres MD  [facility] ([facility]) [address] [facility] [id number] [phone]         Requested Prescriptions   Pending Prescriptions Disp Refills     ACCU-CHEK DEIDRA PLUS test strip [Pharmacy Med Name: ACCU-CHEK DEIDRA PLUS TEST STRP] 50 strip 3     Sig: TEST ONCE DAILY AS DIRECTED       Diabetic Supplies Protocol Passed - 3/4/2020  1:45 AM        Passed - Medication is active on med list        Passed - Patient is 18 years of age or older        Passed - Recent (6 mo) or future (30 days) visit within the authorizing provider's specialty     Patient had office visit in the last 6 months or has a visit in the next 30 days with authorizing provider.  See \"Patient Info\" tab in inbasket, or \"Choose Columns\" in Meds & Orders section of the refill encounter.              "

## 2020-03-05 DIAGNOSIS — N18.30 TYPE 2 DIABETES MELLITUS WITH STAGE 3 CHRONIC KIDNEY DISEASE, WITHOUT LONG-TERM CURRENT USE OF INSULIN (H): ICD-10-CM

## 2020-03-05 DIAGNOSIS — E11.22 TYPE 2 DIABETES MELLITUS WITH STAGE 3 CHRONIC KIDNEY DISEASE, WITHOUT LONG-TERM CURRENT USE OF INSULIN (H): ICD-10-CM

## 2020-03-05 RX ORDER — METFORMIN HCL 500 MG
1000 TABLET, EXTENDED RELEASE 24 HR ORAL
Qty: 90 TABLET | Refills: 1 | Status: SHIPPED | OUTPATIENT
Start: 2020-03-05 | End: 2020-05-27

## 2020-03-05 RX ORDER — METFORMIN HCL 500 MG
TABLET, EXTENDED RELEASE 24 HR ORAL
Qty: 90 TABLET | Refills: 3 | OUTPATIENT
Start: 2020-03-05

## 2020-03-05 NOTE — TELEPHONE ENCOUNTER
Last Written Prescription Date:  9/17/19  Last Fill Quantity: 90 tablet,  # refills: 3   Last office visit: 1/17/2020 with prescribing provider:  Larisa   Future Office Visit:   Next 5 appointments (look out 90 days)    May 19, 2020 10:00 AM CDT  Office Visit with Julio César Peres MD  [facility] ([facility]) [address] [facility] [address] [phone]         Requested Prescriptions   Pending Prescriptions Disp Refills     metFORMIN (GLUCOPHAGE-XR) 500 MG 24 hr tablet [Pharmacy Med Name: METFORMIN HCL  MG TABLET] 90 tablet 3     Sig: TAKE 2 TABLETS BY MOUTH DAILY (WITH DINNER)       Biguanide Agents Passed - 3/5/2020  9:21 AM        Passed - Blood pressure less than 140/90 in past 6 months     BP Readings from Last 3 Encounters:   01/17/20 138/76   10/15/19 134/69   09/16/19 (!) 153/84                 Passed - Patient has documented LDL within the past 12 mos.     Recent Labs   Lab Test 09/16/19  1038   LDL 75             Passed - Patient has had a Microalbumin in the past 15 mos.     Recent Labs   Lab Test 03/15/19  1100   MICROL 7   UMALCR 7.07             Passed - Patient is age 10 or older        Passed - Patient has documented A1c within the specified period of time.     If HgbA1C is 8 or greater, it needs to be on file within the past 3 months.  If less than 8, must be on file within the past 6 months.     Recent Labs   Lab Test 01/17/20  1021   A1C 6.0*             Passed - Patient's CR is NOT>1.4 OR Patient's EGFR is NOT<45 within past 12 mos.     Recent Labs   Lab Test 09/16/19  1038   GFRESTIMATED 58*   GFRESTBLACK 67       Recent Labs   Lab Test 09/16/19  1038   CR 0.95             Passed - Patient does NOT have a diagnosis of CHF.        Passed - Medication is active on med list        Passed - Patient is not pregnant        Passed - Patient has not had a positive pregnancy test within the past 12 mos.         Passed - Recent (6 mo) or future (30 days)  "visit within the authorizing provider's specialty     Patient had office visit in the last 6 months or has a visit in the next 30 days with authorizing provider or within the authorizing provider's specialty.  See \"Patient Info\" tab in inbasket, or \"Choose Columns\" in Meds & Orders section of the refill encounter.              "

## 2020-03-05 NOTE — TELEPHONE ENCOUNTER
Prescription approved per [facility] Refill Protocol.  Frances Marinelli RN

## 2020-04-24 ENCOUNTER — TELEPHONE (OUTPATIENT)
Dept: FAMILY MEDICINE | Facility: CLINIC | Age: 80
End: 2020-04-24

## 2020-04-24 NOTE — TELEPHONE ENCOUNTER
Panel Management Review      Patient has the following on her problem list:     Diabetes    ASA: Passed    Last A1C  Lab Results   Component Value Date    A1C 6.0 01/17/2020    A1C 7.9 09/16/2019    A1C 7.5 03/15/2019    A1C 6.4 07/17/2018    A1C 6.3 03/13/2018     A1C tested: FAILED    Last LDL:    Lab Results   Component Value Date    CHOL 155 09/16/2019     Lab Results   Component Value Date    HDL 56 09/16/2019     Lab Results   Component Value Date    LDL 75 09/16/2019     Lab Results   Component Value Date    TRIG 122 09/16/2019     Lab Results   Component Value Date    CHOLHDLRATIO 2.7 09/22/2015     Lab Results   Component Value Date    NHDL 99 09/16/2019       Is the patient on a Statin? YES             Is the patient on Aspirin? YES    Medications     HMG CoA Reductase Inhibitors     simvastatin (ZOCOR) 20 MG tablet       Salicylates     aspirin 325 MG EC tablet             Last three blood pressure readings:  BP Readings from Last 3 Encounters:   01/17/20 138/76   10/15/19 134/69   09/16/19 (!) 153/84       Date of last diabetes office visit: 9/16/19     Tobacco History:     History   Smoking Status     Never Smoker   Smokeless Tobacco     Never Used         Hypertension   Last three blood pressure readings:  BP Readings from Last 3 Encounters:   01/17/20 138/76   10/15/19 134/69   09/16/19 (!) 153/84     Blood pressure: Passed    HTN Guidelines:  Less than 140/90      Composite cancer screening  Chart review shows that this patient is due/due soon for the following dexa  Summary:    Patient is due/failing the following:   dexa    Action needed:   None, flu shot declined    Type of outreach:    none    Questions for provider review:    None                                                                                                                                    Brenda Martines CMA       Chart routed to Care Team .

## 2020-05-08 ENCOUNTER — VIRTUAL VISIT (OUTPATIENT)
Dept: FAMILY MEDICINE | Facility: CLINIC | Age: 80
End: 2020-05-08
Payer: COMMERCIAL

## 2020-05-08 VITALS — DIASTOLIC BLOOD PRESSURE: 74 MMHG | SYSTOLIC BLOOD PRESSURE: 122 MMHG

## 2020-05-08 DIAGNOSIS — M10.9 ACUTE GOUT, UNSPECIFIED CAUSE, UNSPECIFIED SITE: ICD-10-CM

## 2020-05-08 DIAGNOSIS — F41.9 ANXIETY: ICD-10-CM

## 2020-05-08 DIAGNOSIS — I10 ESSENTIAL HYPERTENSION, BENIGN: ICD-10-CM

## 2020-05-08 DIAGNOSIS — N18.30 CKD (CHRONIC KIDNEY DISEASE) STAGE 3, GFR 30-59 ML/MIN (H): ICD-10-CM

## 2020-05-08 DIAGNOSIS — E78.5 HYPERLIPIDEMIA LDL GOAL <100: ICD-10-CM

## 2020-05-08 DIAGNOSIS — E11.22 TYPE 2 DIABETES MELLITUS WITH STAGE 3 CHRONIC KIDNEY DISEASE, WITHOUT LONG-TERM CURRENT USE OF INSULIN (H): Primary | ICD-10-CM

## 2020-05-08 DIAGNOSIS — N18.30 TYPE 2 DIABETES MELLITUS WITH STAGE 3 CHRONIC KIDNEY DISEASE, WITHOUT LONG-TERM CURRENT USE OF INSULIN (H): Primary | ICD-10-CM

## 2020-05-08 PROCEDURE — 99213 OFFICE O/P EST LOW 20 MIN: CPT | Mod: 95 | Performed by: INTERNAL MEDICINE

## 2020-05-08 NOTE — PROGRESS NOTES
"Ashley Liu is a 79 year old female who is being evaluated via a billable telephone visit.      The patient has been notified of following:     \"This telephone visit will be conducted via a call between you and your physician/provider. We have found that certain health care needs can be provided without the need for a physical exam.  This service lets us provide the care you need with a short phone conversation.  If a prescription is necessary we can send it directly to your pharmacy.  If lab work is needed we can place an order for that and you can then stop by our lab to have the test done at a later time.    Telephone visits are billed at different rates depending on your insurance coverage. During this emergency period, for some insurers they may be billed the same as an in-person visit.  Please reach out to your insurance provider with any questions.    If during the course of the call the physician/provider feels a telephone visit is not appropriate, you will not be charged for this service.\"    Patient has given verbal consent for Telephone visit?  Yes    What phone number would you like to be contacted at? [phone]    How would you like to obtain your AVS? Mail a copy    Subjective     Ashley Liu is a 79 year old female who presents to clinic today for the following health issues:    The patient has diabetes with addition of metformin 2019 and last a1c super as noted.  She checks her sugars in am and running  with metformin, rarely higher.  No sores on feet or toes.    The patient has high cholesterol and at last office visit stopped zocor due to aches.  Off it has been better with less aches and pains.    She has hypertension and checking blood pressure occasionally, has been very good.    No chest pain or shortness of breath, no coughs or fever.  No gout.      Her anxiety has been doing very well, no significant issues lately.    Past Medical History:   Diagnosis Date     Anxiety     " rare ativan use     Atypical chest pain 2013    neg est echo     Chest tightness 03/2019    neg est echo     CKD (chronic kidney disease) stage 3, GFR 30-59 ml/min (H)      Dizziness 2014    neg est echo     Glaucoma      Gout attack 06/29/2015, 9/19    elevated uric acid     HTN (hypertension)     stopped hctz 2015 due to gout     Hx of colonoscopy 2008, 2018    tics     Hypercholesteremia     added rx 12/12, aches with zocor, then added lipitor 2015     OA (osteoarthritis)      Peripheral neuropathy      Stroke (H) 2004    intimal dissection     Type II or unspecified type diabetes mellitus without mention of complication, not stated as uncontrolled     diet controlled; added metformin 9/16/19     Vertigo 2004    hosp fsd, mri pos     Vitamin D deficiency 2013     Past Surgical History:   Procedure Laterality Date     COLONOSCOPY N/A 8/23/2018    Procedure: COLONOSCOPY;  colonoscopy;  Surgeon: Alexei Lea MD;  Location:  GI     ECTOPIC PREGNANCY SURGERY  80's     HYSTERECTOMY, PAP NO LONGER INDICATED  1980    fibriods and endometriosis, maggy and bso     Social History     Socioeconomic History     Marital status:      Spouse name: Not on file     Number of children: 2     Years of education: Not on file     Highest education level: Not on file   Occupational History     Occupation: retired, worked for [employer]   Social Needs     Financial resource strain: Not on file     Food insecurity     Worry: Not on file     Inability: Not on file     Transportation needs     Medical: Not on file     Non-medical: Not on file   Tobacco Use     Smoking status: Never Smoker     Smokeless tobacco: Never Used   Substance and Sexual Activity     Alcohol use: No     Alcohol/week: 0.0 standard drinks     Drug use: No     Sexual activity: Not Currently   Lifestyle     Physical activity     Days per week: Not on file     Minutes per session: Not on file     Stress: Not on file   Relationships     Social connections      Talks on phone: Not on file     Gets together: Not on file     Attends Denominational service: Not on file     Active member of club or organization: Not on file     Attends meetings of clubs or organizations: Not on file     Relationship status: Not on file     Intimate partner violence     Fear of current or ex partner: Not on file     Emotionally abused: Not on file     Physically abused: Not on file     Forced sexual activity: Not on file   Other Topics Concern     Parent/sibling w/ CABG, MI or angioplasty before 65F 55M? Not Asked   Social History Narrative     Not on file     Current Outpatient Medications   Medication Sig Dispense Refill     ACCU-CHEK DEIDRA PLUS test strip TEST ONCE DAILY AS DIRECTED 100 strip 0     ACCU-CHEK FASTCLIX LANCETS MISC 1 each daily. 1 Box 3     amLODIPine (NORVASC) 5 MG tablet Take 1 tablet (5 mg) by mouth daily 90 tablet 3     aspirin 325 MG EC tablet Take 1 tablet by mouth daily. 100 tablet 3     brimonidine (ALPHAGAN P) 0.1 % ophthalmic solution Place 1 drop into both eyes 2 times daily.       Cholecalciferol (VITAMIN D) 2000 UNITS CAPS Take 2,000 Units by mouth.       colchicine (COLCRYS) 0.6 MG tablet Take 1 tablet daily prn gout attack 20 tablet 1     irbesartan (AVAPRO) 300 MG tablet Take 1 tablet (300 mg) by mouth At Bedtime 90 tablet 3     LORazepam (ATIVAN) 0.5 MG tablet Take 1 tablet (0.5 mg) by mouth daily as needed for anxiety 30 tablet 0     metFORMIN (GLUCOPHAGE-XR) 500 MG 24 hr tablet Take 2 tablets (1,000 mg) by mouth daily (with dinner) 90 tablet 1     MULTIPLE VITAMIN PO Take  by mouth. 2 childrens multi vitamins daily.        simvastatin (ZOCOR) 20 MG tablet Take 1 tablet (20 mg) by mouth At Bedtime 90 tablet 3     travoprost Z, benzalkonium, (TRAVATAN) 0.004 % ophthalmic solution 1 drop every evening.       No Known Allergies  FAMILY HISTORY NOTED AND REVIEWED    REVIEW OF SYSTEMS: above    PHYSICAL EXAM    /74      ASSESSMENT:  1. Diabetes with ckd, sugars  super with addition of metformin, will get follow up creat and a1c  2. Ckd, follow up labs  3. Elevated cholesterol, off statin due to aches, will follow up cholesterol and consider other prescription  4. Anxiety, controlled  5. Gout, no issues  6. Hypertension, controlled    PLAN:  Follow up labs  Exercise  Follow up 4 months    Time 10:30    Julio César Peres M.D.

## 2020-05-11 DIAGNOSIS — N18.30 TYPE 2 DIABETES MELLITUS WITH STAGE 3 CHRONIC KIDNEY DISEASE, WITHOUT LONG-TERM CURRENT USE OF INSULIN (H): ICD-10-CM

## 2020-05-11 DIAGNOSIS — E11.22 TYPE 2 DIABETES MELLITUS WITH STAGE 3 CHRONIC KIDNEY DISEASE, WITHOUT LONG-TERM CURRENT USE OF INSULIN (H): ICD-10-CM

## 2020-05-11 DIAGNOSIS — N18.30 CKD (CHRONIC KIDNEY DISEASE) STAGE 3, GFR 30-59 ML/MIN (H): ICD-10-CM

## 2020-05-11 DIAGNOSIS — E78.5 HYPERLIPIDEMIA LDL GOAL <100: ICD-10-CM

## 2020-05-11 LAB
ANION GAP SERPL CALCULATED.3IONS-SCNC: 4 MMOL/L (ref 3–14)
BUN SERPL-MCNC: 13 MG/DL (ref 7–30)
CALCIUM SERPL-MCNC: 9.3 MG/DL (ref 8.5–10.1)
CHLORIDE SERPL-SCNC: 107 MMOL/L (ref 94–109)
CHOLEST SERPL-MCNC: 211 MG/DL
CO2 SERPL-SCNC: 27 MMOL/L (ref 20–32)
CREAT SERPL-MCNC: 1.09 MG/DL (ref 0.52–1.04)
CREAT UR-MCNC: 51 MG/DL
GFR SERPL CREATININE-BSD FRML MDRD: 49 ML/MIN/{1.73_M2}
GLUCOSE SERPL-MCNC: 117 MG/DL (ref 70–99)
HBA1C MFR BLD: 6.1 % (ref 0–5.6)
HDLC SERPL-MCNC: 58 MG/DL
LDLC SERPL CALC-MCNC: 128 MG/DL
MICROALBUMIN UR-MCNC: 7 MG/L
MICROALBUMIN/CREAT UR: 13.42 MG/G CR (ref 0–25)
NONHDLC SERPL-MCNC: 153 MG/DL
POTASSIUM SERPL-SCNC: 4.5 MMOL/L (ref 3.4–5.3)
SODIUM SERPL-SCNC: 138 MMOL/L (ref 133–144)
TRIGL SERPL-MCNC: 126 MG/DL

## 2020-05-11 PROCEDURE — 80061 LIPID PANEL: CPT | Performed by: INTERNAL MEDICINE

## 2020-05-11 PROCEDURE — 83036 HEMOGLOBIN GLYCOSYLATED A1C: CPT | Performed by: INTERNAL MEDICINE

## 2020-05-11 PROCEDURE — 82043 UR ALBUMIN QUANTITATIVE: CPT | Performed by: INTERNAL MEDICINE

## 2020-05-11 PROCEDURE — 80048 BASIC METABOLIC PNL TOTAL CA: CPT | Performed by: INTERNAL MEDICINE

## 2020-05-11 PROCEDURE — 36415 COLL VENOUS BLD VENIPUNCTURE: CPT | Performed by: INTERNAL MEDICINE

## 2020-05-11 NOTE — RESULT ENCOUNTER NOTE
Your labs look super, the urine, chemistries and hemoglobin a1c.  Your cholesterol is up as expected.  I would suggest starting a different cholesterol medication and if the aches return then stop it.  Please let me know if this is ok.    Julio César Peres M.D.

## 2020-05-13 ENCOUNTER — TELEPHONE (OUTPATIENT)
Dept: FAMILY MEDICINE | Facility: CLINIC | Age: 80
End: 2020-05-13

## 2020-05-13 DIAGNOSIS — E78.5 HYPERLIPIDEMIA LDL GOAL <100: Primary | ICD-10-CM

## 2020-05-13 RX ORDER — ATORVASTATIN CALCIUM 10 MG/1
10 TABLET, FILM COATED ORAL DAILY
Qty: 60 TABLET | Refills: 6 | Status: SHIPPED | OUTPATIENT
Start: 2020-05-13 | End: 2020-12-01

## 2020-05-13 NOTE — TELEPHONE ENCOUNTER
Spoke with Pt, she is agreeable to trying atorvastatin     Will contact clinic if symptoms arise    Jazzmine GAINES RN

## 2020-05-13 NOTE — TELEPHONE ENCOUNTER
Patient calling because she could not respond to provider note in my chart.  Patient states she is willing to try a different statin as recommended by provider.    Routing to provider for review.  Pharmacy is pended.    KATHY CabelloN, RN  [facility]

## 2020-05-13 NOTE — TELEPHONE ENCOUNTER
Reason for Call:  Request for results:    Name of test or procedure: lab    Date of test of procedure:  5/11    Location of the test or procedure:  [facility]    OK to leave the result message on voice mail or with a family member? YES    Phone number Patient can be reached at:  Home number on file [phone] (home)    Additional comments: please go over results with pt    Call taken on 5/13/2020 at 9:53 AM by Aubrey Cervantes

## 2020-05-27 DIAGNOSIS — N18.30 TYPE 2 DIABETES MELLITUS WITH STAGE 3 CHRONIC KIDNEY DISEASE, WITHOUT LONG-TERM CURRENT USE OF INSULIN (H): ICD-10-CM

## 2020-05-27 DIAGNOSIS — E11.22 TYPE 2 DIABETES MELLITUS WITH STAGE 3 CHRONIC KIDNEY DISEASE, WITHOUT LONG-TERM CURRENT USE OF INSULIN (H): ICD-10-CM

## 2020-05-27 RX ORDER — METFORMIN HCL 500 MG
1000 TABLET, EXTENDED RELEASE 24 HR ORAL
Qty: 90 TABLET | Refills: 1 | Status: SHIPPED | OUTPATIENT
Start: 2020-05-27 | End: 2020-09-24

## 2020-09-21 ENCOUNTER — TELEPHONE (OUTPATIENT)
Dept: FAMILY MEDICINE | Facility: CLINIC | Age: 80
End: 2020-09-21

## 2020-09-21 DIAGNOSIS — I10 ESSENTIAL HYPERTENSION, BENIGN: ICD-10-CM

## 2020-09-21 DIAGNOSIS — E11.22 TYPE 2 DIABETES MELLITUS WITH STAGE 3 CHRONIC KIDNEY DISEASE, WITHOUT LONG-TERM CURRENT USE OF INSULIN (H): ICD-10-CM

## 2020-09-21 DIAGNOSIS — N18.30 TYPE 2 DIABETES MELLITUS WITH STAGE 3 CHRONIC KIDNEY DISEASE, WITHOUT LONG-TERM CURRENT USE OF INSULIN (H): ICD-10-CM

## 2020-09-21 RX ORDER — METFORMIN HCL 500 MG
1000 TABLET, EXTENDED RELEASE 24 HR ORAL
Qty: 90 TABLET | Refills: 1 | Status: CANCELLED | OUTPATIENT
Start: 2020-09-21

## 2020-09-21 NOTE — TELEPHONE ENCOUNTER
Refill request:    METFORMIN HCL  MG TABLET    Summary: TAKE 2 TABLETS (1,000 MG) BY MOUTH DAILY (WITH DINNER), Disp-90 tablet,R-1, E-Prescribe   Dose, Route, Frequency: 1,000 mg, Oral, DAILY WITH SUPPER  Start: 5/27/2020  Ord/Sold: 5/27/2020    AMLODIPINE 5 MG TABLET    Summary: Take 1 tablet (5 mg) by mouth daily, Disp-90 tablet, R-3, E-Prescribe   Dose, Route, Frequency: 5 mg, Oral, DAILY  Start: 9/16/2019  Ord/Sold: 9/16/2019

## 2020-09-22 DIAGNOSIS — I10 ESSENTIAL HYPERTENSION, BENIGN: ICD-10-CM

## 2020-09-22 RX ORDER — AMLODIPINE BESYLATE 5 MG/1
5 TABLET ORAL DAILY
Qty: 90 TABLET | Refills: 0 | Status: SHIPPED | OUTPATIENT
Start: 2020-09-22 | End: 2020-12-01

## 2020-09-22 NOTE — TELEPHONE ENCOUNTER
Refill request:    IRBESARTAN 300 MG TABLET    Summary: Take 1 tablet (300 mg) by mouth At Bedtime, Disp-90 tablet, R-3, E-Prescribe   Dose, Route, Frequency: 300 mg, Oral, AT BEDTIME  Start: 9/16/2019  Ord/Sold: 9/16/2019

## 2020-09-22 NOTE — TELEPHONE ENCOUNTER
Metformin:  Denied  Too early    Amlodipine:  Prescription approved per [facility] Refill Protocol.    Amanda GOMEZ RN

## 2020-09-23 RX ORDER — IRBESARTAN 300 MG/1
300 TABLET ORAL AT BEDTIME
Qty: 90 TABLET | Refills: 0 | Status: SHIPPED | OUTPATIENT
Start: 2020-09-23 | End: 2020-12-01

## 2020-09-24 RX ORDER — METFORMIN HCL 500 MG
1000 TABLET, EXTENDED RELEASE 24 HR ORAL
Qty: 180 TABLET | Refills: 0 | Status: SHIPPED | OUTPATIENT
Start: 2020-09-24 | End: 2020-12-01

## 2020-09-24 NOTE — TELEPHONE ENCOUNTER
Pt kallie call this morning about her METFORMIN HCL  MG TABLET. Pt stated that her Pharm have call in twice to get a refill for the pt but no one never got back to the pt. Pt is asking to get her METFORMIN HCL  MG TABLET refill soon. Pt last tablet will be this Sunday.  Anyone questions to give the Pt or her Pharm a call. Ph: [phone]  Pharm : [phone]

## 2020-09-24 NOTE — TELEPHONE ENCOUNTER
"Rx was denied for \"too early\" however, when written in 5/2020 quantity did not reflect sig of 2 tabs per day (was written at #90,R-1 instead of #180, R-1)    Sending Rx to pharmacy for 90 day supply. Pt due for f/u in December with PCP for diabetes check-up    Called Pt to update and scheduled for f/u with PCP in Dec    Jazzmine GAINES RN    "

## 2020-10-06 ENCOUNTER — MYC MEDICAL ADVICE (OUTPATIENT)
Dept: FAMILY MEDICINE | Facility: CLINIC | Age: 80
End: 2020-10-06

## 2020-10-06 DIAGNOSIS — M79.675 TOE PAIN, LEFT: ICD-10-CM

## 2020-10-06 RX ORDER — COLCHICINE 0.6 MG/1
TABLET ORAL
Qty: 20 TABLET | Refills: 1 | Status: SHIPPED | OUTPATIENT
Start: 2020-10-06 | End: 2022-02-01

## 2020-10-06 NOTE — TELEPHONE ENCOUNTER
See message, RX fails protocol as labs out of date, but did have virtual visit in May  Pended for review    See pt's note about holding statin and leg cramps improving.    Was on 10mg Atorvastatin daily  Lynn Blackmon RN

## 2020-11-04 ENCOUNTER — TRANSFERRED RECORDS (OUTPATIENT)
Dept: HEALTH INFORMATION MANAGEMENT | Facility: CLINIC | Age: 80
End: 2020-11-04

## 2020-11-04 LAB — RETINOPATHY: NEGATIVE

## 2020-11-24 ENCOUNTER — TRANSFERRED RECORDS (OUTPATIENT)
Dept: HEALTH INFORMATION MANAGEMENT | Facility: CLINIC | Age: 80
End: 2020-11-24

## 2020-12-01 ENCOUNTER — OFFICE VISIT (OUTPATIENT)
Dept: FAMILY MEDICINE | Facility: CLINIC | Age: 80
End: 2020-12-01
Payer: COMMERCIAL

## 2020-12-01 VITALS
WEIGHT: 152 LBS | HEART RATE: 84 BPM | OXYGEN SATURATION: 98 % | DIASTOLIC BLOOD PRESSURE: 80 MMHG | TEMPERATURE: 98.6 F | SYSTOLIC BLOOD PRESSURE: 174 MMHG | BODY MASS INDEX: 24.43 KG/M2 | HEIGHT: 66 IN

## 2020-12-01 DIAGNOSIS — E11.22 TYPE 2 DIABETES MELLITUS WITH STAGE 3A CHRONIC KIDNEY DISEASE, WITHOUT LONG-TERM CURRENT USE OF INSULIN (H): Primary | ICD-10-CM

## 2020-12-01 DIAGNOSIS — N18.31 TYPE 2 DIABETES MELLITUS WITH STAGE 3A CHRONIC KIDNEY DISEASE, WITHOUT LONG-TERM CURRENT USE OF INSULIN (H): Primary | ICD-10-CM

## 2020-12-01 DIAGNOSIS — Z23 NEED FOR PROPHYLACTIC VACCINATION AND INOCULATION AGAINST INFLUENZA: ICD-10-CM

## 2020-12-01 DIAGNOSIS — E55.9 VITAMIN D DEFICIENCY: ICD-10-CM

## 2020-12-01 DIAGNOSIS — E78.5 HYPERLIPIDEMIA LDL GOAL <100: ICD-10-CM

## 2020-12-01 DIAGNOSIS — M10.9 ACUTE GOUT, UNSPECIFIED CAUSE, UNSPECIFIED SITE: ICD-10-CM

## 2020-12-01 DIAGNOSIS — I10 ESSENTIAL HYPERTENSION, BENIGN: ICD-10-CM

## 2020-12-01 DIAGNOSIS — F41.9 ANXIETY: ICD-10-CM

## 2020-12-01 DIAGNOSIS — Z23 NEED FOR VACCINATION: ICD-10-CM

## 2020-12-01 DIAGNOSIS — Z11.59 ENCOUNTER FOR HEPATITIS C SCREENING TEST FOR LOW RISK PATIENT: ICD-10-CM

## 2020-12-01 DIAGNOSIS — Z00.00 ROUTINE GENERAL MEDICAL EXAMINATION AT A HEALTH CARE FACILITY: ICD-10-CM

## 2020-12-01 LAB
ERYTHROCYTE [DISTWIDTH] IN BLOOD BY AUTOMATED COUNT: 14.2 % (ref 10–15)
HBA1C MFR BLD: 6 % (ref 0–5.6)
HCT VFR BLD AUTO: 40.6 % (ref 35–47)
HGB BLD-MCNC: 13.6 G/DL (ref 11.7–15.7)
MCH RBC QN AUTO: 30.9 PG (ref 26.5–33)
MCHC RBC AUTO-ENTMCNC: 33.5 G/DL (ref 31.5–36.5)
MCV RBC AUTO: 92 FL (ref 78–100)
PLATELET # BLD AUTO: 304 10E9/L (ref 150–450)
RBC # BLD AUTO: 4.4 10E12/L (ref 3.8–5.2)
WBC # BLD AUTO: 5.6 10E9/L (ref 4–11)

## 2020-12-01 PROCEDURE — 90732 PPSV23 VACC 2 YRS+ SUBQ/IM: CPT | Performed by: INTERNAL MEDICINE

## 2020-12-01 PROCEDURE — 80048 BASIC METABOLIC PNL TOTAL CA: CPT | Performed by: INTERNAL MEDICINE

## 2020-12-01 PROCEDURE — 99213 OFFICE O/P EST LOW 20 MIN: CPT | Mod: 25 | Performed by: INTERNAL MEDICINE

## 2020-12-01 PROCEDURE — 36415 COLL VENOUS BLD VENIPUNCTURE: CPT | Performed by: INTERNAL MEDICINE

## 2020-12-01 PROCEDURE — 85027 COMPLETE CBC AUTOMATED: CPT | Performed by: INTERNAL MEDICINE

## 2020-12-01 PROCEDURE — 90471 IMMUNIZATION ADMIN: CPT | Performed by: INTERNAL MEDICINE

## 2020-12-01 PROCEDURE — 83036 HEMOGLOBIN GLYCOSYLATED A1C: CPT | Performed by: INTERNAL MEDICINE

## 2020-12-01 PROCEDURE — 90472 IMMUNIZATION ADMIN EACH ADD: CPT | Performed by: INTERNAL MEDICINE

## 2020-12-01 PROCEDURE — 90662 IIV NO PRSV INCREASED AG IM: CPT | Performed by: INTERNAL MEDICINE

## 2020-12-01 PROCEDURE — 99207 PR FOOT EXAM NO CHARGE: CPT | Mod: 25 | Performed by: INTERNAL MEDICINE

## 2020-12-01 PROCEDURE — 86803 HEPATITIS C AB TEST: CPT | Performed by: INTERNAL MEDICINE

## 2020-12-01 PROCEDURE — 82306 VITAMIN D 25 HYDROXY: CPT | Performed by: INTERNAL MEDICINE

## 2020-12-01 PROCEDURE — 80061 LIPID PANEL: CPT | Performed by: INTERNAL MEDICINE

## 2020-12-01 PROCEDURE — 99397 PER PM REEVAL EST PAT 65+ YR: CPT | Mod: 25 | Performed by: INTERNAL MEDICINE

## 2020-12-01 RX ORDER — IRBESARTAN 300 MG/1
300 TABLET ORAL AT BEDTIME
Qty: 90 TABLET | Refills: 3 | Status: SHIPPED | OUTPATIENT
Start: 2020-12-01 | End: 2022-02-08

## 2020-12-01 RX ORDER — METFORMIN HCL 500 MG
1000 TABLET, EXTENDED RELEASE 24 HR ORAL
Qty: 180 TABLET | Refills: 3 | Status: SHIPPED | OUTPATIENT
Start: 2020-12-01 | End: 2021-12-22

## 2020-12-01 RX ORDER — AMLODIPINE BESYLATE 5 MG/1
5 TABLET ORAL DAILY
Qty: 90 TABLET | Refills: 3 | Status: SHIPPED | OUTPATIENT
Start: 2020-12-01 | End: 2021-01-27

## 2020-12-01 ASSESSMENT — ACTIVITIES OF DAILY LIVING (ADL): CURRENT_FUNCTION: NO ASSISTANCE NEEDED

## 2020-12-01 ASSESSMENT — MIFFLIN-ST. JEOR: SCORE: 1191.22

## 2020-12-01 NOTE — NURSING NOTE
Prior to immunization administration, verified patients identity using patient s name and date of birth. Please see Immunization Activity for additional information.     Screening Questionnaire for Adult Immunization    Are you sick today?   No   Do you have allergies to medications, food, a vaccine component or latex?   No   Have you ever had a serious reaction after receiving a vaccination?   No   Do you have a long-term health problem with heart, lung, kidney, or metabolic disease (e.g., diabetes), asthma, a blood disorder, no spleen, complement component deficiency, a cochlear implant, or a spinal fluid leak?  Are you on long-term aspirin therapy?   Yes   Do you have cancer, leukemia, HIV/AIDS, or any other immune system problem?   No   Do you have a parent, brother, or sister with an immune system problem?   No   In the past 3 months, have you taken medications that affect  your immune system, such as prednisone, other steroids, or anticancer drugs; drugs for the treatment of rheumatoid arthritis, Crohn s disease, or psoriasis; or have you had radiation treatments?   No   Have you had a seizure, or a brain or other nervous system problem?   No   During the past year, have you received a transfusion of blood or blood    products, or been given immune (gamma) globulin or antiviral drug?   No   For women: Are you pregnant or is there a chance you could become       pregnant during the next month?   No   Have you received any vaccinations in the past 4 weeks?   No     Immunization questionnaire answers were all negative.        Per orders of Dr. Zamudio, injection of Pneumovax given by Sharon Huertas CMA. Patient instructed to remain in clinic for 15 minutes afterwards, and to report any adverse reaction to me immediately.       Screening performed by Sharon Huertas CMA on 12/1/2020 at 11:20 AM.

## 2020-12-01 NOTE — PROGRESS NOTES
SUBJECTIVE:   Ashley Liu is a 79 year old female who presents for Preventive Visit.    The patient is doing very well, works out some. Checks sugars reg and very good.  As noted off lipitor due to aches, resolved off them, had aches with zocor as well.  Up to date eye exam, no sores on feet or toes.  Anxiety controlled               Past Medical History:      Past Medical History:   Diagnosis Date     Anxiety     rare ativan use     Atypical chest pain 2013    neg est echo     Chest tightness 03/2019    neg est echo     CKD (chronic kidney disease) stage 3, GFR 30-59 ml/min      Dizziness 2014    neg est echo     Glaucoma      Gout attack 06/29/2015, 9/19    elevated uric acid     HTN (hypertension)     stopped hctz 2015 due to gout, side effects with aldactone     Hx of colonoscopy 2008, 2018    tics     Hypercholesteremia     added rx 12/12, aches with zocor, then added lipitor 2015, aches with lipitor 2020     OA (osteoarthritis)      Peripheral neuropathy      Stroke (H) 2004    intimal dissection     Type II or unspecified type diabetes mellitus without mention of complication, not stated as uncontrolled     diet controlled; added metformin 9/16/19     Vertigo 2004    hosp fsd, mri pos     Vitamin D deficiency 2013             Past Surgical History:      Past Surgical History:   Procedure Laterality Date     COLONOSCOPY N/A 8/23/2018    Procedure: COLONOSCOPY;  colonoscopy;  Surgeon: Alexei Lea MD;  Location:  GI     ECTOPIC PREGNANCY SURGERY  80's     HYSTERECTOMY, PAP NO LONGER INDICATED  1980    fibriods and endometriosis, maggy and bso             Social History:     Social History     Socioeconomic History     Marital status:      Spouse name: Not on file     Number of children: 2     Years of education: Not on file     Highest education level: Not on file   Occupational History     Occupation: retired, worked for "[employer]   Social Needs     Financial resource strain: Not on file      Food insecurity     Worry: Not on file     Inability: Not on file     Transportation needs     Medical: Not on file     Non-medical: Not on file   Tobacco Use     Smoking status: Never Smoker     Smokeless tobacco: Never Used   Substance and Sexual Activity     Alcohol use: No     Alcohol/week: 0.0 standard drinks     Drug use: No     Sexual activity: Not Currently   Lifestyle     Physical activity     Days per week: Not on file     Minutes per session: Not on file     Stress: Not on file   Relationships     Social connections     Talks on phone: Not on file     Gets together: Not on file     Attends Zoroastrianism service: Not on file     Active member of club or organization: Not on file     Attends meetings of clubs or organizations: Not on file     Relationship status: Not on file     Intimate partner violence     Fear of current or ex partner: Not on file     Emotionally abused: Not on file     Physically abused: Not on file     Forced sexual activity: Not on file   Other Topics Concern     Parent/sibling w/ CABG, MI or angioplasty before 65F 55M? Not Asked   Social History Narrative     Not on file             Family History:   reviewed         Allergies:   No Known Allergies          Medications:     Current Outpatient Medications   Medication Sig Dispense Refill     ACCU-CHEK DEIDRA PLUS test strip TEST ONCE DAILY AS DIRECTED 100 strip 0     ACCU-CHEK FASTCLIX LANCETS MISC 1 each daily. 1 Box 3     amLODIPine (NORVASC) 5 MG tablet Take 1 tablet (5 mg) by mouth daily 90 tablet 3     aspirin 325 MG EC tablet Take 1 tablet by mouth daily. 100 tablet 3     brimonidine (ALPHAGAN P) 0.1 % ophthalmic solution Place 1 drop into both eyes 2 times daily.       Cholecalciferol (VITAMIN D) 2000 UNITS CAPS Take 2,000 Units by mouth.       colchicine (COLCRYS) 0.6 MG tablet Take 1 tablet daily prn gout attack 20 tablet 1     irbesartan (AVAPRO) 300 MG tablet Take 1 tablet (300 mg) by mouth At Bedtime 90 tablet 3     LORazepam  "(ATIVAN) 0.5 MG tablet Take 1 tablet (0.5 mg) by mouth daily as needed for anxiety 30 tablet 0     metFORMIN (GLUCOPHAGE-XR) 500 MG 24 hr tablet Take 2 tablets (1,000 mg) by mouth daily (with dinner) 180 tablet 3     MULTIPLE VITAMIN PO Take  by mouth. 2 childrens multi vitamins daily.        travoprost Z, benzalkonium, (TRAVATAN) 0.004 % ophthalmic solution 1 drop every evening.                 Review of Systems:   The 10 point Review of Systems is negative other than noted in the HPI           Physical Exam:   Blood pressure (!) 174/80, pulse 84, temperature 98.6  F (37  C), temperature source Tympanic, height 1.676 m (5' 6\"), weight 68.9 kg (152 lb), SpO2 98 %, not currently breastfeeding.    Exam:  Constitutional: healthy appearing, alert and in no distress  Heent: Normocephalic. Head without obvious masses or lesions. PERRLDC, EOMI. Mouth exam within normal limits: tongue, mucous membranes, posterior pharynx all normal, no lesions or abnormalities seen.  Tm's and canals within normal limits bilaterally. Neck supple, no nuchal rigidity or masses. No supraclavicular, or cervical adenopathy. Thyroid symmetric, no masses.  Cardiovascular: Regular rate and rhythm, no murmer, rub or gallops.  JVP not elevated, no edema.  Carotids within normal limits bilaterally, no bruits.  Respiratory: Normal respiratory effort.  Lungs clear, normal flow, no wheezing or crackles.  Gastrointestinal: Normal active bowel sounds.   Soft, not tender, no masses, guarding or rebound.  No hepatosplenomegaly.   Musculoskeletal: extremities normal, no gross deformities noted.  Skin: no suspicious lesions or rashes   Neurologic: Mental status within normal limits.  Speech fluent.  No gross motor abnormalities and gait intact.  Psychiatric: mentation appears normal and affect normal.  Feet within normal limits bilat         Data:   Labs sent        Assessment:   1. Normal complete physical exam  2. Diabetes with ckd, follow up labs, control " "has been good  3. Vit d defic, follow up labs  4. Statin aches, consider every other day, check labs  5. Anxiezty, no issues  6. Gout, no issues  7. Hypertension, up now but super at home, she will monitor it  8. hcm         Plan:   Flu shot and pneumovax  Letter with labs  Consider every other day lipitor  Exercise, diet  Follow up 6 months      Julio César Peres M.D.                Patient has been advised of split billing requirements and indicates understanding: Yes   Are you in the first 12 months of your Medicare coverage?  No    Healthy Habits:     In general, how would you rate your overall health?  Very good    Frequency of exercise:  2-3 days/week    Duration of exercise:  45-60 minutes    Do you usually eat at least 4 servings of fruit and vegetables a day, include whole grains    & fiber and avoid regularly eating high fat or \"junk\" foods?  Yes    Taking medications regularly:  Yes    Barriers to taking medications:  None    Medication side effects:  None    Ability to successfully perform activities of daily living:  No assistance needed    Home Safety:  No safety concerns identified    Hearing Impairment:  No hearing concerns    In the past 6 months, have you been bothered by leaking of urine?  No    In general, how would you rate your overall mental or emotional health?  Very good      PHQ-2 Total Score: 0    Additional concerns today:  No    Do you feel safe in your environment? Yes    Have you ever done Advance Care Planning? (For example, a Health Directive, POLST, or a discussion with a medical provider or your loved ones about your wishes): Yes, patient states has an Advance Care Planning document and will bring a copy to the clinic.      Fall risk  Fallen 2 or more times in the past year?: No  Any fall with injury in the past year?: No    Cognitive Screening   1) Repeat 3 items (Leader, Season, Table)    2) Clock draw: NORMAL  3) 3 item recall: Recalls 3 objects  Results: NORMAL clock, 1-2 items " recalled: COGNITIVE IMPAIRMENT LESS LIKELY    Mini-CogTM Copyright JOSE Wilde. Licensed by the author for use in [facility]; reprinted with permission (imelda@[facility]). All rights reserved.          Reviewed and updated as needed this visit by clinical staff   Allergies  Meds   Med Hx            Reviewed and updated as needed this visit by Provider      Med Hx           Social History     Tobacco Use     Smoking status: Never Smoker     Smokeless tobacco: Never Used   Substance Use Topics     Alcohol use: No     Alcohol/week: 0.0 standard drinks     If you drink alcohol do you typically have >3 drinks per day or >7 drinks per week? No    Alcohol Use 5/22/2012   Prescreen: >3 drinks/day or >7 drinks/week? The patient does not drink >3 drinks per day nor >7 drinks per week.               Current providers sharing in care for this patient include:   Patient Care Team:  Julio César Peres MD as PCP - General (Internal Medicine)  Julio César Peres MD as Assigned PCP    The following health maintenance items are reviewed in Epic and correct as of today:  Health Maintenance   Topic Date Due     HEPATITIS C SCREENING  [birth year]30/1958     ZOSTER IMMUNIZATION (1 of 2) 12/30/1992     Pneumococcal Vaccine: 65+ Years (1 of 1 - PPSV23) 12/30/2007     MEDICARE ANNUAL WELLNESS VISIT  05/22/2013     ADVANCE CARE PLANNING  08/21/2017     INFLUENZA VACCINE (1) 09/01/2020     DIABETIC FOOT EXAM  09/16/2020     A1C  11/11/2020     FALL RISK ASSESSMENT  05/08/2021     BMP  05/11/2021     LIPID  05/11/2021     MICROALBUMIN  05/11/2021     EYE EXAM  11/04/2021     DTAP/TDAP/TD IMMUNIZATION (2 - Td) 03/26/2023     PHQ-2  Completed     Pneumococcal Vaccine: Pediatrics (0 to 5 Years) and At-Risk Patients (6 to 64 Years)  Aged Out     IPV IMMUNIZATION  Aged Out     MENINGITIS IMMUNIZATION  Aged Out           Review of Systems      OBJECTIVE:   There were no vitals taken for this visit. Estimated body mass index is 24.97 kg/m  as  "calculated from the following:    Height as of 1/17/20: 1.676 m (5' 6\").    Weight as of 1/17/20: 70.2 kg (154 lb 11.2 oz).  Physical Exam          ASSESSMENT / PLAN:       Patient has been advised of split billing requirements and indicates understanding: No  COUNSELING:  Reviewed preventive health counseling, as reflected in patient instructions       Regular exercise       Healthy diet/nutrition    Estimated body mass index is 24.97 kg/m  as calculated from the following:    Height as of 1/17/20: 1.676 m (5' 6\").    Weight as of 1/17/20: 70.2 kg (154 lb 11.2 oz).        She reports that she has never smoked. She has never used smokeless tobacco.      Appropriate preventive services were discussed with this patient, including applicable screening as appropriate for cardiovascular disease, diabetes, osteopenia/osteoporosis, and glaucoma.  As appropriate for age/gender, discussed screening for colorectal cancer, prostate cancer, breast cancer, and cervical cancer. Checklist reviewing preventive services available has been given to the patient.    Reviewed patients plan of care and provided an AVS. The Basic Care Plan (routine screening as documented in Health Maintenance) for Ashley meets the Care Plan requirement. This Care Plan has been established and reviewed with the Patient.    Counseling Resources:  ATP IV Guidelines  Pooled Cohorts Equation Calculator  Breast Cancer Risk Calculator  Breast Cancer: Medication to Reduce Risk  FRAX Risk Assessment  ICSI Preventive Guidelines  Dietary Guidelines for Americans, 2010  [employer]'s MyPlate  ASA Prophylaxis  Lung CA Screening    Julio César Peres MD  [facility]    Identified Health Risks:  "

## 2020-12-01 NOTE — PROGRESS NOTES
Subjective     Ashley Liu is a 79 year old female who presents to clinic today for the following health issues:    HPI         Diabetes RECHECK    {additonal problems for provider to add (Optional):945133}    Review of Systems   {ROS COMP (Optional):194391}      Objective    There were no vitals taken for this visit.  There is no height or weight on file to calculate BMI.  Physical Exam   {Exam List (Optional):585263}    {Diagnostic Test Results (Optional):164480}        {PROVIDER CHARTING PREFERENCE:227897}

## 2020-12-02 LAB
ANION GAP SERPL CALCULATED.3IONS-SCNC: 4 MMOL/L (ref 3–14)
BUN SERPL-MCNC: 17 MG/DL (ref 7–30)
CALCIUM SERPL-MCNC: 9.3 MG/DL (ref 8.5–10.1)
CHLORIDE SERPL-SCNC: 109 MMOL/L (ref 94–109)
CHOLEST SERPL-MCNC: 215 MG/DL
CO2 SERPL-SCNC: 27 MMOL/L (ref 20–32)
CREAT SERPL-MCNC: 1.03 MG/DL (ref 0.52–1.04)
DEPRECATED CALCIDIOL+CALCIFEROL SERPL-MC: 55 UG/L (ref 20–75)
GFR SERPL CREATININE-BSD FRML MDRD: 52 ML/MIN/{1.73_M2}
GLUCOSE SERPL-MCNC: 102 MG/DL (ref 70–99)
HCV AB SERPL QL IA: NONREACTIVE
HDLC SERPL-MCNC: 56 MG/DL
LDLC SERPL CALC-MCNC: 132 MG/DL
NONHDLC SERPL-MCNC: 159 MG/DL
POTASSIUM SERPL-SCNC: 4.2 MMOL/L (ref 3.4–5.3)
SODIUM SERPL-SCNC: 140 MMOL/L (ref 133–144)
TRIGL SERPL-MCNC: 135 MG/DL

## 2020-12-02 NOTE — RESULT ENCOUNTER NOTE
It was a please seeing you.  You should be able to view your labs.    Your tests look very good.  This includes the diabetes test or a1c, your blood count, vitamin d level, test for hepatitis C, blood salts and kidney tests.      Your cholesterol is back up as expected.  I would like you to try the lipitor every other day and if the aches return let me know.    If you have any questions please call me.    Julio César Peres M.D.

## 2020-12-08 ENCOUNTER — MYC MEDICAL ADVICE (OUTPATIENT)
Dept: FAMILY MEDICINE | Facility: CLINIC | Age: 80
End: 2020-12-08

## 2020-12-15 DIAGNOSIS — F41.9 ANXIETY: ICD-10-CM

## 2020-12-15 RX ORDER — LORAZEPAM 0.5 MG/1
0.5 TABLET ORAL DAILY PRN
Qty: 30 TABLET | Refills: 0 | Status: SHIPPED | OUTPATIENT
Start: 2020-12-15 | End: 2022-08-09

## 2020-12-15 NOTE — TELEPHONE ENCOUNTER
Controlled Substance Refill Request for LORazepam (ATIVAN) 0.5 MG tablet    Problem List Complete:  Yes   Anxiety  Unknown        checked in past 3 months?  No, route to RN  RN not a delegate to check  for this provider      Last visit 12/01/20 with Larisa SWENSON.  Last filled on 09/16/19 for 30 with 0 refills.

## 2020-12-17 DIAGNOSIS — E11.9 TYPE 2 DIABETES MELLITUS WITHOUT COMPLICATIONS (H): ICD-10-CM

## 2020-12-17 RX ORDER — BLOOD SUGAR DIAGNOSTIC
STRIP MISCELLANEOUS
Qty: 100 STRIP | Refills: 5 | Status: SHIPPED | OUTPATIENT
Start: 2020-12-17 | End: 2022-01-12

## 2020-12-24 ENCOUNTER — NURSE TRIAGE (OUTPATIENT)
Dept: FAMILY MEDICINE | Facility: CLINIC | Age: 80
End: 2020-12-24

## 2020-12-24 NOTE — TELEPHONE ENCOUNTER
Reason for call:  Patient reporting a symptom    Symptom or request: BP 16//79    Duration (how long have symptoms been present): last couple days     Have you been treated for this before? Yes    Additional comments: Patient is wondering if she should increase her BP medication and if so which one     Phone Number patient can be reached at:  Home number on file [phone] (home)    Best Time:  anytime    Can we leave a detailed message on this number:  YES    Call taken on 12/24/2020 at 8:46 AM by Hollie Jasso

## 2020-12-24 NOTE — TELEPHONE ENCOUNTER
Let's raise the norvasc to 10mg a day, if gets edema let me know.  Follow up readings 4 weeks    Julio César Peres M.D.

## 2020-12-24 NOTE — TELEPHONE ENCOUNTER
Pt called reporting elevated BP readings this week and a mild headache. BP readings since 12/22/2020 are 175/82,162/79,155/79, 150/84, and 150/76.    She denies, dizziness, vision problems, chest pain or unsteady gait. She wants to know if she should make any adjustments to BP medication. Pt takes amlodipine 5 mg tablet AM and irbesartan 300 mg at PM. Advised pt to schedule an appt. Pt would like a virtual appt with PCP only. Please advice if any dose medications adjustments needed or if ok to double book a virtual appt.        Additional Information    Negative: Sounds like a life-threatening emergency to the triager    Negative: Pregnant > 20 weeks or postpartum (< 6 weeks after delivery) and new hand or face swelling    Negative: Pregnant > 20 weeks and BP > 140/90    Negative: Systolic BP >= 160 OR Diastolic >= 100, and any cardiac or neurologic symptoms (e.g., chest pain, difficulty breathing, unsteady gait, blurred vision)    Negative: Patient sounds very sick or weak to the triager    Negative: BP Systolic BP >= 140 OR Diastolic >= 90 and postpartum (from 0 to 6 weeks after delivery)    Negative: Systolic BP >= 180 OR Diastolic >= 110, and missed most recent dose of blood pressure medication    Negative: Systolic BP >= 180 OR Diastolic >= 110    Negative: Patient wants to be seen    Negative: Ran out of BP medications    Negative: Taking BP medications and feels is having side effects (e.g., impotence, cough, dizziness)    Systolic BP >= 160 OR Diastolic >= 100    Protocols used: HIGH BLOOD PRESSURE-A-OH

## 2021-03-02 ENCOUNTER — IMMUNIZATION (OUTPATIENT)
Dept: NURSING | Facility: CLINIC | Age: 81
End: 2021-03-02
Payer: COMMERCIAL

## 2021-03-02 PROCEDURE — 91300 PR COVID VAC PFIZER DIL RECON 30 MCG/0.3 ML IM: CPT

## 2021-03-02 PROCEDURE — 0001A PR COVID VAC PFIZER DIL RECON 30 MCG/0.3 ML IM: CPT

## 2021-03-03 ENCOUNTER — MYC MEDICAL ADVICE (OUTPATIENT)
Dept: FAMILY MEDICINE | Facility: CLINIC | Age: 81
End: 2021-03-03

## 2021-03-04 NOTE — TELEPHONE ENCOUNTER
To PCP,  Please see patient message.    Do you want her to come in??  Please advise.    Blanca Wetzel RN

## 2021-03-05 NOTE — TELEPHONE ENCOUNTER
Pt was informed OV needed. She opts to schedule appointment  with PCP only. Future appt was scheduled 03/29/221. Pt agreed to call clinic and schedule appt with other in clinic providers if worsening arm pain. Warning signs reviewed.  No further action needed unless provider would approve use of a virtual appt slot to see her F2F.

## 2021-03-09 ENCOUNTER — OFFICE VISIT (OUTPATIENT)
Dept: FAMILY MEDICINE | Facility: CLINIC | Age: 81
End: 2021-03-09
Payer: COMMERCIAL

## 2021-03-09 VITALS
OXYGEN SATURATION: 99 % | TEMPERATURE: 97 F | BODY MASS INDEX: 24.43 KG/M2 | HEIGHT: 66 IN | HEART RATE: 65 BPM | WEIGHT: 152 LBS | DIASTOLIC BLOOD PRESSURE: 82 MMHG | SYSTOLIC BLOOD PRESSURE: 159 MMHG

## 2021-03-09 DIAGNOSIS — N18.31 TYPE 2 DIABETES MELLITUS WITH STAGE 3A CHRONIC KIDNEY DISEASE, WITHOUT LONG-TERM CURRENT USE OF INSULIN (H): ICD-10-CM

## 2021-03-09 DIAGNOSIS — M79.621 PAIN OF RIGHT UPPER ARM: Primary | ICD-10-CM

## 2021-03-09 DIAGNOSIS — E11.22 TYPE 2 DIABETES MELLITUS WITH STAGE 3A CHRONIC KIDNEY DISEASE, WITHOUT LONG-TERM CURRENT USE OF INSULIN (H): ICD-10-CM

## 2021-03-09 DIAGNOSIS — H92.03 OTALGIA OF BOTH EARS: ICD-10-CM

## 2021-03-09 PROCEDURE — 99213 OFFICE O/P EST LOW 20 MIN: CPT | Performed by: INTERNAL MEDICINE

## 2021-03-09 ASSESSMENT — MIFFLIN-ST. JEOR: SCORE: 1186.22

## 2021-03-09 NOTE — PROGRESS NOTES
The patient presents for right upper arm pain. This started about a month ago when she was reaching back to grab something in her car. It has improved but it still present. If she is at rest she does not feel it. It is only when she reaches certain ways. Nothing on the left side. She has not been ill and not having fevers. No tingling numbness or weakness. No chest pain or breathing trouble.    She also noticed that a few days ago she had pounding in her left ear and then it went to the right. It is subsequently resolved. No chest pain or shortness of breath. No palpitations. No cold symptoms.    She has diabetes with CKD. She is on Crestor and is tolerating that, this was started in lieu of Lipitor which she was having aches with.    Past Medical History:   Diagnosis Date     Anxiety     rare ativan use     Atypical chest pain 2013    neg est echo     Chest tightness 03/2019    neg est echo     CKD (chronic kidney disease) stage 3, GFR 30-59 ml/min      Dizziness 2014    neg est echo     Glaucoma      Gout attack 06/29/2015, 9/19    elevated uric acid     HTN (hypertension)     stopped hctz 2015 due to gout, side effects with aldactone     Hx of colonoscopy 2008, 2018    tics     Hypercholesteremia     added rx 12/12, aches with zocor, then added lipitor 2015, aches with lipitor 2020, changed to crestor 12/20     OA (osteoarthritis)      Peripheral neuropathy      Stroke (H) 2004    intimal dissection     Type II or unspecified type diabetes mellitus without mention of complication, not stated as uncontrolled     diet controlled; added metformin 9/16/19     Vertigo 2004    hosp fsd, mri pos     Vitamin D deficiency 2013     Past Surgical History:   Procedure Laterality Date     COLONOSCOPY N/A 8/23/2018    Procedure: COLONOSCOPY;  colonoscopy;  Surgeon: Alexei Lea MD;  Location:  GI     ECTOPIC PREGNANCY SURGERY  80's     HYSTERECTOMY, PAP NO LONGER INDICATED  1980    fibriods and endometriosis, maggy  and bso     Social History     Socioeconomic History     Marital status:      Spouse name: Not on file     Number of children: 2     Years of education: Not on file     Highest education level: Not on file   Occupational History     Occupation: retired, worked for [employer]   Social Needs     Financial resource strain: Not on file     Food insecurity     Worry: Not on file     Inability: Not on file     Transportation needs     Medical: Not on file     Non-medical: Not on file   Tobacco Use     Smoking status: Never Smoker     Smokeless tobacco: Never Used   Substance and Sexual Activity     Alcohol use: No     Alcohol/week: 0.0 standard drinks     Drug use: No     Sexual activity: Not Currently   Lifestyle     Physical activity     Days per week: Not on file     Minutes per session: Not on file     Stress: Not on file   Relationships     Social connections     Talks on phone: Not on file     Gets together: Not on file     Attends Episcopalian service: Not on file     Active member of club or organization: Not on file     Attends meetings of clubs or organizations: Not on file     Relationship status: Not on file     Intimate partner violence     Fear of current or ex partner: Not on file     Emotionally abused: Not on file     Physically abused: Not on file     Forced sexual activity: Not on file   Other Topics Concern     Parent/sibling w/ CABG, MI or angioplasty before 65F 55M? Not Asked   Social History Narrative     Not on file     Current Outpatient Medications   Medication Sig Dispense Refill     ACCU-CHEK FASTCLIX LANCETS MISC 1 each daily. 1 Box 3     amLODIPine (NORVASC) 10 MG tablet Take 1 tablet (10 mg) by mouth daily 90 tablet 3     aspirin 325 MG EC tablet Take 1 tablet by mouth daily. 100 tablet 3     blood glucose (ACCU-CHEK DEIDRA PLUS) test strip TEST ONCE DAILY AS DIRECTED 100 strip 5     brimonidine (ALPHAGAN P) 0.1 % ophthalmic solution Place 1 drop into both eyes 2 times daily.        "Cholecalciferol (VITAMIN D) 2000 UNITS CAPS Take 2,000 Units by mouth.       colchicine (COLCRYS) 0.6 MG tablet Take 1 tablet daily prn gout attack 20 tablet 1     irbesartan (AVAPRO) 300 MG tablet Take 1 tablet (300 mg) by mouth At Bedtime 90 tablet 3     LORazepam (ATIVAN) 0.5 MG tablet Take 1 tablet (0.5 mg) by mouth daily as needed for anxiety 30 tablet 0     metFORMIN (GLUCOPHAGE-XR) 500 MG 24 hr tablet Take 2 tablets (1,000 mg) by mouth daily (with dinner) 180 tablet 3     MULTIPLE VITAMIN PO Take  by mouth. 2 childrens multi vitamins daily.        rosuvastatin (CRESTOR) 5 MG tablet Take 1 tablet (5 mg) by mouth daily 30 tablet 3     travoprost Z, benzalkonium, (TRAVATAN) 0.004 % ophthalmic solution 1 drop every evening.       No Known Allergies  FAMILY HISTORY NOTED AND REVIEWED    REVIEW OF SYSTEMS: above    PHYSICAL EXAM    BP (!) 159/82 (BP Location: Left arm, Patient Position: Chair, Cuff Size: Adult Large)   Pulse 65   Temp 97  F (36.1  C) (Oral)   Ht 1.676 m (5' 6\")   Wt 68.9 kg (152 lb)   SpO2 99%   Breastfeeding No   BMI 24.53 kg/m      Patient appears non toxic  Normal cms right ue, the arm itself is not red or warm. She does have pain with some movements of her right upper extremity including abduction and reaching back.    Her TMs and canals are within normal limits.    Cardiovascular regular rate rhythm no murmur rub or gallop no JVP. Carotids within normal limits and no bruit    ASSESSMENT:  1. Right arm strain, I discussed with patient doing physical therapy but since it is improving we will wait. She will call if it worsens or is not gone soon.  2. Ear pounding, nothing on exam. No signs of infection. I doubt it is carotid related or stroke related. I doubt A. fib.  3. Diabetes with CKD    PLAN:  Above  For the pounding if more then holter and carotid us    Julio César Peres M.D.        "

## 2021-03-23 ENCOUNTER — IMMUNIZATION (OUTPATIENT)
Dept: NURSING | Facility: CLINIC | Age: 81
End: 2021-03-23
Attending: INTERNAL MEDICINE
Payer: COMMERCIAL

## 2021-03-23 PROCEDURE — 0002A PR COVID VAC PFIZER DIL RECON 30 MCG/0.3 ML IM: CPT

## 2021-03-23 PROCEDURE — 91300 PR COVID VAC PFIZER DIL RECON 30 MCG/0.3 ML IM: CPT

## 2021-04-01 DIAGNOSIS — E78.5 HYPERLIPIDEMIA LDL GOAL <100: ICD-10-CM

## 2021-04-01 RX ORDER — ROSUVASTATIN CALCIUM 5 MG/1
TABLET, COATED ORAL
Qty: 30 TABLET | Refills: 4 | Status: SHIPPED | OUTPATIENT
Start: 2021-04-01 | End: 2021-06-29

## 2021-05-03 NOTE — TELEPHONE ENCOUNTER
Patient called back.  Thank you for letting her know.  
Please call and let patient know her ua is completely clear    Julio César Peres M.D.    
none

## 2021-05-20 ENCOUNTER — TRANSFERRED RECORDS (OUTPATIENT)
Dept: HEALTH INFORMATION MANAGEMENT | Facility: CLINIC | Age: 81
End: 2021-05-20

## 2021-06-29 DIAGNOSIS — E78.5 HYPERLIPIDEMIA LDL GOAL <100: ICD-10-CM

## 2021-06-30 RX ORDER — ROSUVASTATIN CALCIUM 5 MG/1
5 TABLET, COATED ORAL DAILY
Qty: 90 TABLET | Refills: 2 | Status: SHIPPED | OUTPATIENT
Start: 2021-06-30 | End: 2022-02-08

## 2021-06-30 NOTE — TELEPHONE ENCOUNTER
Prescription approved per [facility] Refill Protocol.  Lynn Blackmon, RN  [facility] RN Triage Team

## 2021-07-01 ENCOUNTER — TRANSFERRED RECORDS (OUTPATIENT)
Dept: HEALTH INFORMATION MANAGEMENT | Facility: CLINIC | Age: 81
End: 2021-07-01

## 2021-07-10 ENCOUNTER — HEALTH MAINTENANCE LETTER (OUTPATIENT)
Age: 81
End: 2021-07-10

## 2021-08-30 ENCOUNTER — OFFICE VISIT (OUTPATIENT)
Dept: FAMILY MEDICINE | Facility: CLINIC | Age: 81
End: 2021-08-30
Payer: COMMERCIAL

## 2021-08-30 VITALS
HEART RATE: 77 BPM | RESPIRATION RATE: 17 BRPM | DIASTOLIC BLOOD PRESSURE: 76 MMHG | HEIGHT: 66 IN | WEIGHT: 156 LBS | OXYGEN SATURATION: 99 % | SYSTOLIC BLOOD PRESSURE: 138 MMHG | TEMPERATURE: 97.4 F | BODY MASS INDEX: 25.07 KG/M2

## 2021-08-30 DIAGNOSIS — M79.10 MYALGIA: ICD-10-CM

## 2021-08-30 DIAGNOSIS — E55.9 VITAMIN D DEFICIENCY: ICD-10-CM

## 2021-08-30 DIAGNOSIS — E78.5 HYPERLIPIDEMIA LDL GOAL <100: ICD-10-CM

## 2021-08-30 DIAGNOSIS — N18.30 STAGE 3 CHRONIC KIDNEY DISEASE, UNSPECIFIED WHETHER STAGE 3A OR 3B CKD (H): ICD-10-CM

## 2021-08-30 DIAGNOSIS — I10 ESSENTIAL HYPERTENSION, BENIGN: ICD-10-CM

## 2021-08-30 DIAGNOSIS — E11.22 TYPE 2 DIABETES MELLITUS WITH STAGE 3A CHRONIC KIDNEY DISEASE, WITHOUT LONG-TERM CURRENT USE OF INSULIN (H): Primary | ICD-10-CM

## 2021-08-30 DIAGNOSIS — N18.31 TYPE 2 DIABETES MELLITUS WITH STAGE 3A CHRONIC KIDNEY DISEASE, WITHOUT LONG-TERM CURRENT USE OF INSULIN (H): Primary | ICD-10-CM

## 2021-08-30 DIAGNOSIS — L29.9 ITCHING: ICD-10-CM

## 2021-08-30 LAB
ALBUMIN SERPL-MCNC: 4.1 G/DL (ref 3.4–5)
ALP SERPL-CCNC: 118 U/L (ref 40–150)
ALT SERPL W P-5'-P-CCNC: 27 U/L (ref 0–50)
ANION GAP SERPL CALCULATED.3IONS-SCNC: <1 MMOL/L (ref 3–14)
AST SERPL W P-5'-P-CCNC: 17 U/L (ref 0–45)
BILIRUB SERPL-MCNC: 0.3 MG/DL (ref 0.2–1.3)
BUN SERPL-MCNC: 18 MG/DL (ref 7–30)
CALCIUM SERPL-MCNC: 9.6 MG/DL (ref 8.5–10.1)
CHLORIDE BLD-SCNC: 110 MMOL/L (ref 94–109)
CHOLEST SERPL-MCNC: 135 MG/DL
CK SERPL-CCNC: 70 U/L (ref 30–225)
CO2 SERPL-SCNC: 30 MMOL/L (ref 20–32)
CREAT SERPL-MCNC: 1.2 MG/DL (ref 0.52–1.04)
CREAT UR-MCNC: 214 MG/DL
ERYTHROCYTE [DISTWIDTH] IN BLOOD BY AUTOMATED COUNT: 13.8 % (ref 10–15)
FASTING STATUS PATIENT QL REPORTED: YES
GFR SERPL CREATININE-BSD FRML MDRD: 43 ML/MIN/1.73M2
GLUCOSE BLD-MCNC: 115 MG/DL (ref 70–99)
HBA1C MFR BLD: 6.6 % (ref 0–5.6)
HCT VFR BLD AUTO: 39.8 % (ref 35–47)
HDLC SERPL-MCNC: 64 MG/DL
HGB BLD-MCNC: 13.5 G/DL (ref 11.7–15.7)
LDLC SERPL CALC-MCNC: 54 MG/DL
MCH RBC QN AUTO: 31 PG (ref 26.5–33)
MCHC RBC AUTO-ENTMCNC: 33.9 G/DL (ref 31.5–36.5)
MCV RBC AUTO: 91 FL (ref 78–100)
MICROALBUMIN UR-MCNC: 29 MG/L
MICROALBUMIN/CREAT UR: 13.55 MG/G CR (ref 0–25)
NONHDLC SERPL-MCNC: 71 MG/DL
PLATELET # BLD AUTO: 298 10E3/UL (ref 150–450)
POTASSIUM BLD-SCNC: 4.4 MMOL/L (ref 3.4–5.3)
PROT SERPL-MCNC: 8 G/DL (ref 6.8–8.8)
RBC # BLD AUTO: 4.36 10E6/UL (ref 3.8–5.2)
SODIUM SERPL-SCNC: 140 MMOL/L (ref 133–144)
TRIGL SERPL-MCNC: 86 MG/DL
WBC # BLD AUTO: 5.3 10E3/UL (ref 4–11)

## 2021-08-30 PROCEDURE — 36415 COLL VENOUS BLD VENIPUNCTURE: CPT | Performed by: INTERNAL MEDICINE

## 2021-08-30 PROCEDURE — 80061 LIPID PANEL: CPT | Performed by: INTERNAL MEDICINE

## 2021-08-30 PROCEDURE — 80053 COMPREHEN METABOLIC PANEL: CPT | Performed by: INTERNAL MEDICINE

## 2021-08-30 PROCEDURE — 82550 ASSAY OF CK (CPK): CPT | Performed by: INTERNAL MEDICINE

## 2021-08-30 PROCEDURE — 85027 COMPLETE CBC AUTOMATED: CPT | Performed by: INTERNAL MEDICINE

## 2021-08-30 PROCEDURE — 83036 HEMOGLOBIN GLYCOSYLATED A1C: CPT | Performed by: INTERNAL MEDICINE

## 2021-08-30 PROCEDURE — 82043 UR ALBUMIN QUANTITATIVE: CPT | Performed by: INTERNAL MEDICINE

## 2021-08-30 PROCEDURE — 99214 OFFICE O/P EST MOD 30 MIN: CPT | Performed by: INTERNAL MEDICINE

## 2021-08-30 ASSESSMENT — MIFFLIN-ST. JEOR: SCORE: 1204.36

## 2021-08-30 NOTE — PATIENT INSTRUCTIONS
1. Stop the crestor and send me an update on the aches in 3 to 4 weeks    2. Start zyrtec and take one daily for the itching    3. I will send you the labs from today    Julio César Peres M.D.

## 2021-08-30 NOTE — PROGRESS NOTES
Progress Notes by Chilo Singh [facility] self[1.1M] for[1.1T] treatment[1.1M].  She resides in[1.1T] [place], The patient presents for multiple issues.    The patient has diabetes for which she takes Metformin.  She checks her sugars in the morning and they have been a little bit higher or low under 140.  She has no sores on her feet or toes.    The patient has had itching intermittently for the last few months.  No rashes.  No fevers or night sweats.  No medication changes.    The patient also has myalgias.  These are similar to what she had with her other statins.  She takes the Crestor regularly.  She is not having headaches.  No jaw claudication.    The patient has hyperlipidemia.  She also has vitamin D deficiency and her last vitamin D level was fine.  Her initial blood pressure was high but the second 1 for me was better.  She has CKD.    Review of systems is otherwise negative.    Past Medical History:   Diagnosis Date     Anxiety     rare ativan use     Atypical chest pain 2013    neg est echo     Chest tightness 03/2019    neg est echo     CKD (chronic kidney disease) stage 3, GFR 30-59 ml/min      Dizziness 2014    neg est echo     Glaucoma      Gout attack 06/29/2015, 9/19    elevated uric acid     HTN (hypertension)     stopped hctz 2015 due to gout, side effects with aldactone     Hx of colonoscopy 2008, 2018    tics     Hypercholesteremia     added rx 12/12, aches with zocor, then added lipitor 2015, aches with lipitor 2020, changed to crestor 12/20     OA (osteoarthritis)      Peripheral neuropathy      Stroke (H) 2004    intimal dissection     Type II or unspecified type diabetes mellitus without mention of complication, not stated as uncontrolled     diet controlled; added metformin 9/16/19     Vertigo 2004    hosp fsd, mri pos     Vitamin D deficiency 2013     Past Surgical History:   Procedure Laterality Date     COLONOSCOPY N/A 8/23/2018    Procedure: COLONOSCOPY;  colonoscopy;  Surgeon: Alexei Lea MD;  Location:  GI     ECTOPIC PREGNANCY SURGERY  80's     HYSTERECTOMY, PAP NO LONGER  INDICATED  1980    fibriods and endometriosis, maggy and bso     Social History     Socioeconomic History     Marital status:      Spouse name: Not on file     Number of children: 2     Years of education: Not on file     Highest education level: Not on file   Occupational History     Occupation: retired, worked for [employer]   Tobacco Use     Smoking status: Never Smoker     Smokeless tobacco: Never Used   Substance and Sexual Activity     Alcohol use: No     Alcohol/week: 0.0 standard drinks     Drug use: No     Sexual activity: Not Currently   Other Topics Concern     Parent/sibling w/ CABG, MI or angioplasty before 65F 55M? Not Asked   Social History Narrative     Not on file     Social Determinants of Health     Financial Resource Strain:      Difficulty of Paying Living Expenses:    Food Insecurity:      Worried About Running Out of Food in the Last Year:      Ran Out of Food in the Last Year:    Transportation Needs:      Lack of Transportation (Medical):      Lack of Transportation (Non-Medical):    Physical Activity:      Days of Exercise per Week:      Minutes of Exercise per Session:    Stress:      Feeling of Stress :    Social Connections:      Frequency of Communication with Friends and Family:      Frequency of Social Gatherings with Friends and Family:      Attends Sabianism Services:      Active Member of Clubs or Organizations:      Attends Club or Organization Meetings:      Marital Status:    Intimate Partner Violence:      Fear of Current or Ex-Partner:      Emotionally Abused:      Physically Abused:      Sexually Abused:      Current Outpatient Medications   Medication Sig Dispense Refill     ACCU-CHEK FASTCLIX LANCETS MISC 1 each daily. 1 Box 3     amLODIPine (NORVASC) 10 MG tablet Take 1 tablet (10 mg) by mouth daily 90 tablet 3     aspirin 325 MG EC tablet Take 1 tablet by mouth daily. 100 tablet 3     blood glucose (ACCU-CHEK DEIDRA PLUS) test strip TEST ONCE DAILY AS DIRECTED 100 strip 5  at [employer] brimonidine (ALPHAGAN P) 0.1 % ophthalmic solution Place 1 drop into both eyes 2 times daily.       Cholecalciferol (VITAMIN D) 2000 UNITS CAPS Take 2,000 Units by mouth.       irbesartan (AVAPRO) 300 MG tablet Take 1 tablet (300 mg) by mouth At Bedtime 90 tablet 3     LORazepam (ATIVAN) 0.5 MG tablet Take 1 tablet (0.5 mg) by mouth daily as needed for anxiety 30 tablet 0     metFORMIN (GLUCOPHAGE-XR) 500 MG 24 hr tablet Take 2 tablets (1,000 mg) by mouth daily (with dinner) 180 tablet 3     MULTIPLE VITAMIN PO Take  by mouth. 2 childrens multi vitamins daily.        rosuvastatin (CRESTOR) 5 MG tablet Take 1 tablet (5 mg) by mouth daily 90 tablet 2     travoprost Z, benzalkonium, (TRAVATAN) 0.004 % ophthalmic solution 1 drop every evening.       colchicine (COLCRYS) 0.6 MG tablet Take 1 tablet daily prn gout attack (Patient not taking: Reported on 8/30/2021) 20 tablet 1     No Known Allergies  FAMILY HISTORY NOTED AND REVIEWED    REVIEW OF SYSTEMS: above    PHYSICAL EXAM    /76   Pulse 77   Temp 97.4  F (36.3  C) (Temporal)   Resp 17   Ht 1.676 m (5' 6\")   Wt 70.8 kg (156 lb)   SpO2 99%   BMI 25.18 kg/m      Patient appears non toxic  Mouth - tongue midline and within normal limits, mucous membranes and posterior pharynx within normal limits, no lesions seen.  Neck - no masses, lesions or tenderness  Nodes - no supraclavicular, cervical or axially adenopathy .  Lungs - clear, normal flow  Cardiovascular - regular rate and rhythm, no murmer, rub or gallop, no jvp or edema, carotids within normal limits, no bruits.  Abdomen - normal active bowel sounds, soft, non tender, no masses, guarding or rebound, no hepatosplenomegaly  Feet within normal limits, normal pulses    Labs sent    ASSESSMENT:  1. Diabetes with ckd, follow up labs, no issues  2. Hypertension, controlled  3. Myalgias, check ck, suspect statin  4. Pruitis, cause not clear, doubt hepatic, malig cause, check labs, try zyrtec  5. Elevated " cholesterol, on statin  6. hcm    PLAN:  shingrix at pharm  Letter with labs  Try off crestor and send me update 3 to 4 weeks  Zyrtec daily for itching  Follow up 6 months      Julio César Peres M.D.                   [facility] Providers.    Electronically Signed by:    Cihlo Singh PSY.D , 5/23/2018[KH1.1T]                   Revision History        User Key Date/Time User Provider Type Action    > KH1.1 05/23/18 10:56 AM Chilo Singh PSY.D Psychologist Sign    M - Manual, T - Template

## 2021-08-31 NOTE — RESULT ENCOUNTER NOTE
It was a please seeing you.  You should be able to view your labs.    Your urine is clear and your cholesterol is much improved which is great.  Your blood salts, kidney tests, liver tests and proteins show a couple of minor abnormalities but nothing significant.  Your complete blood count is normal.      The diabetes test or hemoglobin a1c is a bit higher this time.  Please be sure to exercise and eat a healthy diet for this.    Please send me an update on the aches 3 to 4 weeks after being off the crestor.    Julio César Peres M.D.

## 2021-09-04 ENCOUNTER — HEALTH MAINTENANCE LETTER (OUTPATIENT)
Age: 81
End: 2021-09-04

## 2021-10-07 ENCOUNTER — NURSE TRIAGE (OUTPATIENT)
Dept: FAMILY MEDICINE | Facility: CLINIC | Age: 81
End: 2021-10-07

## 2021-10-07 NOTE — TELEPHONE ENCOUNTER
S-(situation): Lower flank area with a dull discomfort on both sides. No UTI symptoms. She only will notice the pain when she intimally is lying in bed. She does not notice the pain at any other time  of day or night. Pain at  night is at a 2-3 level.     B-(background): No UTI /kidney infection symptoms.     A-(assessment): Patient concerned with lower back/flank jerome discomfort.     R-(recommendations): Will cover patient's symptoms with PCP.       Reason for Disposition    Pain mainly in flank (i.e., in the side, over the lower ribs or just below the ribs)    Additional Information    Negative: Passed out (i.e., lost consciousness, collapsed and was not responding)    Negative: Shock suspected (e.g., cold/pale/clammy skin, too weak to stand, low BP, rapid pulse)    Negative: Sounds like a life-threatening emergency to the triager    Negative: Passed out (i.e., lost consciousness, collapsed and was not responding)    Negative: Shock suspected (e.g., cold/pale/clammy skin, too weak to stand, low BP, rapid pulse)    Negative: Difficult to awaken or acting confused (e.g., disoriented, slurred speech)    Negative: Sounds like a life-threatening emergency to the triager    Negative: Followed a major injury to the back (e.g., MVA, fall > 10 feet or 3 meters, penetrating injury, etc.)    Negative: Back pain or flank pain during pregnancy    Negative: Upper, mid or lower back pain that occurs mainly in the midline    Negative: [1] SEVERE pain (e.g., excruciating, scale 8-10) AND [2] present > 1 hour    Negative: [1] SEVERE pain (e.g., excruciating, scale 8-10) AND [2] not improved after pain medicine    Negative: [1] Sudden onset of severe flank pain AND [2] age > 60    Negative: [1] Abdominal pain AND [2] age > 60    Negative: [1] Unable to urinate (or only a few drops) > 4 hours AND     [2] bladder feels very full (e.g., palpable bladder or strong urge to urinate)    Negative: Vomiting    Negative: Weakness of a leg or  "foot (e.g., unable to bear weight, dragging foot)    Negative: Patient sounds very sick or weak to the triager    Negative: Fever > 100.4 F (38.0 C)    Negative: Pain or burning with passing urine (urination)    Negative: MODERATE pain (e.g., interferes with normal activities or awakens from sleep)    Negative: Pain radiates into groin, scrotum    Negative: Blood in urine (red, pink, or tea-colored)    Negative: Pregnant  (Exception: mild pain that is only present with movement)    Negative: Diabetes mellitus or weak immune system (e.g., HIV positive, cancer chemo, splenectomy, organ transplant, chronic steroids) (Exception: mild pain that is only present with movement)    Negative: Rash in same area as pain (may be described as \"small blisters\")    Negative: [1] MILD pain (i.e., scale 1-3; does not interfere with normal activities) AND [2] present > 3 days    Negative: History of kidney stones    Protocols used: BACK PAIN-A-, FLANK PAIN-A-    Deborah Schroeder RN  [facility]     "

## 2021-11-04 ENCOUNTER — TRANSFERRED RECORDS (OUTPATIENT)
Dept: HEALTH INFORMATION MANAGEMENT | Facility: CLINIC | Age: 81
End: 2021-11-04
Payer: COMMERCIAL

## 2021-11-04 LAB — RETINOPATHY: NEGATIVE

## 2021-12-02 ENCOUNTER — TRANSFERRED RECORDS (OUTPATIENT)
Dept: HEALTH INFORMATION MANAGEMENT | Facility: CLINIC | Age: 81
End: 2021-12-02
Payer: COMMERCIAL

## 2021-12-09 ENCOUNTER — MYC MEDICAL ADVICE (OUTPATIENT)
Dept: FAMILY MEDICINE | Facility: CLINIC | Age: 81
End: 2021-12-09
Payer: COMMERCIAL

## 2021-12-10 ENCOUNTER — OFFICE VISIT (OUTPATIENT)
Dept: URGENT CARE | Facility: URGENT CARE | Age: 81
End: 2021-12-10
Payer: COMMERCIAL

## 2021-12-10 ENCOUNTER — NURSE TRIAGE (OUTPATIENT)
Dept: FAMILY MEDICINE | Facility: CLINIC | Age: 81
End: 2021-12-10

## 2021-12-10 VITALS
SYSTOLIC BLOOD PRESSURE: 147 MMHG | DIASTOLIC BLOOD PRESSURE: 70 MMHG | OXYGEN SATURATION: 100 % | RESPIRATION RATE: 16 BRPM | HEART RATE: 85 BPM | TEMPERATURE: 98.1 F

## 2021-12-10 DIAGNOSIS — K21.00 GASTROESOPHAGEAL REFLUX DISEASE WITH ESOPHAGITIS, UNSPECIFIED WHETHER HEMORRHAGE: ICD-10-CM

## 2021-12-10 DIAGNOSIS — R07.9 CHEST PAIN, UNSPECIFIED TYPE: Primary | ICD-10-CM

## 2021-12-10 PROCEDURE — 93000 ELECTROCARDIOGRAM COMPLETE: CPT | Performed by: FAMILY MEDICINE

## 2021-12-10 PROCEDURE — 99214 OFFICE O/P EST MOD 30 MIN: CPT | Performed by: FAMILY MEDICINE

## 2021-12-10 NOTE — TELEPHONE ENCOUNTER
See triage encounter on 12/10/2021.    Ivy Saldana RN  [facility]

## 2021-12-10 NOTE — PROGRESS NOTES
Assessment & Plan     Chest pain, unspecified type    - EKG 12-lead complete w/read - Clinics    Reassured with stable EKG today.  However, no troponin labs available today per lab.  Advised if sx change or worsen- to go to ED for eval.    Gastroesophageal reflux disease with esophagitis, unspecified whether hemorrhage    - omeprazole (PRILOSEC) 20 MG DR capsule; Take 1 capsule (20 mg) by mouth 2 times daily    Recommend trial of PPI bid x 2-4 weeks.  If no change or worsening sx- recommend Follow-up with PCP for recheck prn.  Advised cutting back on coffee as able to see if sx neelam.  If pain changes and should travel into chest- advised emergent eval in ED.    Jacqueline Coombs MD  [facility] Ramirez is a 80 year old who presents for the following health issues     HPI     Patient presents with worsening mid epigastric pain since Monday.  Has had some pain radiating into back.  No arm or jaw pain.  No anterior chest wall pain or chest pain.    Notes increased caffeine 1-2 cups of coffee daily.  Nonsmoker.  Has tried PeptoBismol and Pepcid AC x 1 with no relief.  No hx of CAD.  + DM and HTN and hyperlipidemia.    NO SOB.  Looks well.  No PAN.    Review of Systems   Constitutional, HEENT, cardiovascular, pulmonary, GI, , musculoskeletal, neuro, skin, endocrine and psych systems are negative, except as otherwise noted.      Objective    BP (!) 147/70   Pulse 85   Temp 98.1  F (36.7  C) (Tympanic)   Resp 16   SpO2 100%   Breastfeeding No   There is no height or weight on file to calculate BMI.  Physical Exam   GENERAL: healthy, alert and no distress  EYES: Eyes grossly normal to inspection, PERRL and conjunctivae and sclerae normal  NECK: no adenopathy, no asymmetry, masses, or scars and thyroid normal to palpation  RESP: lungs clear to auscultation - no rales, rhonchi or wheezes  CV: regular rate and rhythm, normal S1 S2, no S3 or S4, no murmur, click or rub, no  peripheral edema and peripheral pulses strong  ABDOMEN: soft, nontender, no hepatosplenomegaly, no masses and bowel sounds normal, localizes pain in midepigastric area only  MS: no gross musculoskeletal defects noted, no edema  SKIN: no suspicious lesions or rashes  PSYCH: mentation appears normal, affect normal/bright    EKG - Reviewed and interpreted by me appears normal, NSR, normal axis, normal intervals, no acute ST/T changes c/w ischemia

## 2021-12-10 NOTE — TELEPHONE ENCOUNTER
She should be seen in emergency room for this and rec she go right away, 911 is fine.    Julio César Peres M.D.

## 2021-12-10 NOTE — TELEPHONE ENCOUNTER
"Nurse Triage SBAR    Is this a 2nd Level Triage? YES, LICENSED PRACTITIONER REVIEW IS REQUIRED    Situation    :  Reports chest discomfort and back pain  Background    :  Onset Monday, thought it was acid reflux related, hx of this, took pepto and Pepcid, relieved some of the \"gas\" but still having discomfort.    Assessment   : discomfort, reports it feels like previously dx GERD but what's new is GERD symtpoms. Difficult for patient to describe, not pain/tightness/pressure, located in the \"middle\" below breast and in the back. Consistent, has not gotten better but not gotten worse    (See information below for more triage details.)    Recommendation   : Routing to provider for recommendation on DISPOSITION.    Protocol Recommended Disposition: ED/UC/OV WITH PCP APPROVAL- advised UC if patient does not hear back in an hour, patient expressed verbal understanding.    \"discomfort\", something sitting there  Location: in the middle of from breast above stomach.  Radiate: when it first started across my back, on Monday, \"its uncomfortable feeling\"  Onset: Monday  Pattern: constant  \"its not as heavy\"  \"I wouldn't say pressure, I feel like its not normal\"  Cardiac: DM, HTN  Pulm: no   Denies:dizziness, nausea, vomiting, sweating, fever, difficulty breathing, cough  Cause: unsure.  BP: has been normal  Recurrent:     Advised UC immide    Callback: [phone] to leave detailed VM.    Dr. Peres, On Monday I had what I thought was indigestion. I took pepto bismal and later pepcid ac, no change in tightness in chest. Finally drank ginger ale, I was able to burp, however, the tightness remained . I am still having discomfort in my chest and back. It is not pain. I am  concerned.  My appt with you isn't until Feb 28,2022.  What can I do now?    Reason for Disposition    Chest pain lasting longer than 5 minutes and occurred in last 3 days (72 hours) (Exception: feels exactly the same as previously diagnosed heartburn and " has accompanying sour taste in mouth)    Additional Information    Negative: Severe difficulty breathing (e.g., struggling for each breath, speaks in single words)    Negative: Passed out (i.e., fainted, collapsed and was not responding)    Negative: Difficult to awaken or acting confused (e.g., disoriented, slurred speech)    Negative: Shock suspected (e.g., cold/pale/clammy skin, too weak to stand, low BP, rapid pulse)    Negative: Chest pain lasting longer than 5 minutes and ANY of the following:* Over 45 years old* Over 30 years old and at least one cardiac risk factor (e.g., diabetes, high blood pressure, high cholesterol, smoker, or strong family history of heart disease)* History of heart disease (i.e., angina, heart attack, heart failure, bypass surgery, takes nitroglycerin)* Pain is crushing, pressure-like, or heavy    Negative: Heart beating < 50 beats per minute OR > 140 beats per minute    Negative: Visible sweat on face or sweat dripping down face    Negative: Sounds like a life-threatening emergency to the triager    Negative: Followed an injury to chest    Negative: SEVERE chest pain    Negative: Pain also in shoulder(s) or arm(s) or jaw    Negative: Difficulty breathing    Negative: Cocaine use within last 3 days    Negative: Major surgery in the past month    Negative: Hip or leg fracture (broken bone) in past month (or had cast on leg or ankle in past month)    Negative: Illness requiring prolonged bedrest in past month (e.g., immobilization, long hospital stay)    Negative: Long-distance travel in past month (e.g., car, bus, train, plane; with trip lasting 6 or more hours)    Negative: History of prior 'blood clot' in leg or lungs (i.e., deep vein thrombosis, pulmonary embolism)    Negative: History of inherited increased risk of blood clots (e.g., Factor 5 Leiden, Anti-thrombin 3, Protein C or Protein S deficiency, Prothrombin mutation)    Negative: Heart beating irregularly or very  rapidly    Protocols used: CHEST PAIN-A-[place]

## 2021-12-10 NOTE — TELEPHONE ENCOUNTER
No answer at either number   Huddled with PCP - pt already arrived to [facility] [place] [place].     Sinai ROTH, Triage RN  [facility]

## 2022-01-31 ENCOUNTER — MYC MEDICAL ADVICE (OUTPATIENT)
Dept: FAMILY MEDICINE | Facility: CLINIC | Age: 82
End: 2022-01-31
Payer: COMMERCIAL

## 2022-01-31 DIAGNOSIS — M79.675 TOE PAIN, LEFT: ICD-10-CM

## 2022-02-01 RX ORDER — COLCHICINE 0.6 MG/1
TABLET ORAL
Qty: 20 TABLET | Refills: 1 | Status: SHIPPED | OUTPATIENT
Start: 2022-02-01 | End: 2023-03-14

## 2022-02-01 NOTE — TELEPHONE ENCOUNTER
Routing refill request to provider for review/approval because:  Labs out of range:    Creatinine   Date Value Ref Range Status   08/30/2021 1.20 (H) 0.52 - 1.04 mg/dL Final   12/01/2020 1.03 0.52 - 1.04 mg/dL Final   05/11/2020 1.09 (H) 0.52 - 1.04 mg/dL Final   09/16/2019 0.95 0.52 - 1.04 mg/dL Final   03/15/2019 1.19 (H) 0.52 - 1.04 mg/dL Final   07/17/2018 1.12 (H) 0.52 - 1.04 mg/dL Final   11/16/2017 1.12 (H) 0.52 - 1.04 mg/dL Final    Has an appointment 2/8/2022  Margaret Miller RN

## 2022-02-08 ENCOUNTER — OFFICE VISIT (OUTPATIENT)
Dept: FAMILY MEDICINE | Facility: CLINIC | Age: 82
End: 2022-02-08
Payer: COMMERCIAL

## 2022-02-08 VITALS
HEIGHT: 66 IN | DIASTOLIC BLOOD PRESSURE: 73 MMHG | TEMPERATURE: 97.7 F | BODY MASS INDEX: 24.59 KG/M2 | RESPIRATION RATE: 16 BRPM | HEART RATE: 86 BPM | OXYGEN SATURATION: 100 % | WEIGHT: 153 LBS | SYSTOLIC BLOOD PRESSURE: 134 MMHG

## 2022-02-08 DIAGNOSIS — E78.5 HYPERLIPIDEMIA LDL GOAL <100: ICD-10-CM

## 2022-02-08 DIAGNOSIS — N18.30 STAGE 3 CHRONIC KIDNEY DISEASE, UNSPECIFIED WHETHER STAGE 3A OR 3B CKD (H): ICD-10-CM

## 2022-02-08 DIAGNOSIS — M10.9 ACUTE GOUT, UNSPECIFIED CAUSE, UNSPECIFIED SITE: ICD-10-CM

## 2022-02-08 DIAGNOSIS — N18.31 TYPE 2 DIABETES MELLITUS WITH STAGE 3A CHRONIC KIDNEY DISEASE, WITHOUT LONG-TERM CURRENT USE OF INSULIN (H): ICD-10-CM

## 2022-02-08 DIAGNOSIS — I63.9 CEREBROVASCULAR ACCIDENT (CVA), UNSPECIFIED MECHANISM (H): ICD-10-CM

## 2022-02-08 DIAGNOSIS — E11.9 TYPE 2 DIABETES MELLITUS WITHOUT COMPLICATION, WITHOUT LONG-TERM CURRENT USE OF INSULIN (H): ICD-10-CM

## 2022-02-08 DIAGNOSIS — I10 ESSENTIAL HYPERTENSION, BENIGN: ICD-10-CM

## 2022-02-08 DIAGNOSIS — Z00.00 MEDICARE ANNUAL WELLNESS VISIT, SUBSEQUENT: Primary | ICD-10-CM

## 2022-02-08 DIAGNOSIS — E55.9 VITAMIN D DEFICIENCY: ICD-10-CM

## 2022-02-08 DIAGNOSIS — E11.22 TYPE 2 DIABETES MELLITUS WITH STAGE 3A CHRONIC KIDNEY DISEASE, WITHOUT LONG-TERM CURRENT USE OF INSULIN (H): ICD-10-CM

## 2022-02-08 DIAGNOSIS — G62.9 PERIPHERAL POLYNEUROPATHY: ICD-10-CM

## 2022-02-08 LAB
ERYTHROCYTE [DISTWIDTH] IN BLOOD BY AUTOMATED COUNT: 14.5 % (ref 10–15)
HBA1C MFR BLD: 6.4 % (ref 0–5.6)
HCT VFR BLD AUTO: 43.2 % (ref 35–47)
HGB BLD-MCNC: 14 G/DL (ref 11.7–15.7)
MCH RBC QN AUTO: 30.2 PG (ref 26.5–33)
MCHC RBC AUTO-ENTMCNC: 32.4 G/DL (ref 31.5–36.5)
MCV RBC AUTO: 93 FL (ref 78–100)
PLATELET # BLD AUTO: 346 10E3/UL (ref 150–450)
RBC # BLD AUTO: 4.63 10E6/UL (ref 3.8–5.2)
WBC # BLD AUTO: 4.9 10E3/UL (ref 4–11)

## 2022-02-08 PROCEDURE — 80061 LIPID PANEL: CPT | Performed by: INTERNAL MEDICINE

## 2022-02-08 PROCEDURE — 85027 COMPLETE CBC AUTOMATED: CPT | Performed by: INTERNAL MEDICINE

## 2022-02-08 PROCEDURE — 99214 OFFICE O/P EST MOD 30 MIN: CPT | Mod: 25 | Performed by: INTERNAL MEDICINE

## 2022-02-08 PROCEDURE — 99397 PER PM REEVAL EST PAT 65+ YR: CPT | Performed by: INTERNAL MEDICINE

## 2022-02-08 PROCEDURE — 36415 COLL VENOUS BLD VENIPUNCTURE: CPT | Performed by: INTERNAL MEDICINE

## 2022-02-08 PROCEDURE — 83036 HEMOGLOBIN GLYCOSYLATED A1C: CPT | Performed by: INTERNAL MEDICINE

## 2022-02-08 PROCEDURE — 80048 BASIC METABOLIC PNL TOTAL CA: CPT | Performed by: INTERNAL MEDICINE

## 2022-02-08 RX ORDER — BLOOD SUGAR DIAGNOSTIC
STRIP MISCELLANEOUS
Qty: 100 STRIP | Refills: 3 | Status: SHIPPED | OUTPATIENT
Start: 2022-02-08 | End: 2022-08-26

## 2022-02-08 RX ORDER — IRBESARTAN 300 MG/1
300 TABLET ORAL AT BEDTIME
Qty: 90 TABLET | Refills: 3 | Status: SHIPPED | OUTPATIENT
Start: 2022-02-08 | End: 2023-01-31

## 2022-02-08 RX ORDER — METFORMIN HCL 500 MG
1000 TABLET, EXTENDED RELEASE 24 HR ORAL
Qty: 180 TABLET | Refills: 3 | Status: SHIPPED | OUTPATIENT
Start: 2022-02-08 | End: 2023-02-09

## 2022-02-08 RX ORDER — ROSUVASTATIN CALCIUM 5 MG/1
5 TABLET, COATED ORAL DAILY
Qty: 90 TABLET | Refills: 3 | Status: SHIPPED | OUTPATIENT
Start: 2022-02-08 | End: 2023-08-07

## 2022-02-08 RX ORDER — AMLODIPINE BESYLATE 10 MG/1
10 TABLET ORAL DAILY
Qty: 90 TABLET | Refills: 3 | Status: SHIPPED | OUTPATIENT
Start: 2022-02-08 | End: 2023-01-31

## 2022-02-08 ASSESSMENT — ENCOUNTER SYMPTOMS
ARTHRALGIAS: 1
HEARTBURN: 0
FREQUENCY: 0
HEMATURIA: 0
PALPITATIONS: 0
CONSTIPATION: 0
SORE THROAT: 0
BREAST MASS: 0
DIARRHEA: 0
EYE PAIN: 0
DIZZINESS: 0
FEVER: 0
WEAKNESS: 0
JOINT SWELLING: 1
NERVOUS/ANXIOUS: 0
MYALGIAS: 1
HEADACHES: 0
PARESTHESIAS: 0
CHILLS: 0
HEMATOCHEZIA: 0
DYSURIA: 0
COUGH: 0
ABDOMINAL PAIN: 0
SHORTNESS OF BREATH: 0
NAUSEA: 0

## 2022-02-08 ASSESSMENT — MIFFLIN-ST. JEOR: SCORE: 1185.75

## 2022-02-08 ASSESSMENT — PAIN SCALES - GENERAL: PAINLEVEL: NO PAIN (0)

## 2022-02-08 ASSESSMENT — ACTIVITIES OF DAILY LIVING (ADL): CURRENT_FUNCTION: NO ASSISTANCE NEEDED

## 2022-02-08 NOTE — PROGRESS NOTES
SUBJECTIVE:   Ashley Liu is a 81 year old female who presents for Preventive Visit.    The patient overall is doing well but not exercising regularly which I encouraged.  She has no sores on her feet or toes.  Her sugars since Christmas have been better, around 100 or so.  She otherwise feels well.  As noted, when I saw her in August she had some aches and went off her Crestor.  She is a bit vague as to whether or not things improved off of it but thinks they did.  She is not taking it every other day and for the most part doing well.  Occasionally some stiffness in her hands or arm aches but no headaches or jaw claudication.  No weakness.  No leg symptoms.  No other complaints on review of systems.               Past Medical History:      Past Medical History:   Diagnosis Date     Anxiety     rare ativan use     Atypical chest pain 2013    neg est echo     Chest tightness 03/2019    neg est echo     CKD (chronic kidney disease) stage 3, GFR 30-59 ml/min (H)      Dizziness 2014    neg est echo     Glaucoma      Gout attack 06/29/2015, 9/19    elevated uric acid     HTN (hypertension)     stopped hctz 2015 due to gout, side effects with aldactone     Hx of colonoscopy 2008, 2018    tics     Hypercholesteremia     added rx 12/12, aches with zocor, then added lipitor 2015, aches with lipitor 2020, changed to crestor 12/20     OA (osteoarthritis)      Peripheral neuropathy      Stroke (H) 2004    intimal dissection     Type II or unspecified type diabetes mellitus without mention of complication, not stated as uncontrolled     diet controlled; added metformin 9/16/19     Vertigo 2004    hosp fsd, mri pos     Vitamin D deficiency 2013             Past Surgical History:      Past Surgical History:   Procedure Laterality Date     COLONOSCOPY N/A 8/23/2018    Procedure: COLONOSCOPY;  colonoscopy;  Surgeon: Alexei Lea MD;  Location:  GI     ECTOPIC PREGNANCY SURGERY  80's     HYSTERECTOMY, PAP NO LONGER  INDICATED  1980    fibriods and endometriosis, maggy and bso             Social History:     Social History     Socioeconomic History     Marital status:      Spouse name: Not on file     Number of children: 2     Years of education: Not on file     Highest education level: Not on file   Occupational History     Occupation: retired, worked for [employer]   Tobacco Use     Smoking status: Never Smoker     Smokeless tobacco: Never Used   Substance and Sexual Activity     Alcohol use: No     Alcohol/week: 0.0 standard drinks     Drug use: No     Sexual activity: Not Currently   Other Topics Concern     Parent/sibling w/ CABG, MI or angioplasty before 65F 55M? Not Asked   Social History Narrative     Not on file     Social Determinants of Health     Financial Resource Strain: Not on file   Food Insecurity: Not on file   Transportation Needs: Not on file   Physical Activity: Not on file   Stress: Not on file   Social Connections: Not on file   Intimate Partner Violence: Not on file   Housing Stability: Not on file             Family History:   reviewed         Allergies:   No Known Allergies          Medications:     Current Outpatient Medications   Medication Sig Dispense Refill     ACCU-CHEK FASTCLIX LANCETS MISC 1 each daily. 1 Box 3     amLODIPine (NORVASC) 10 MG tablet Take 1 tablet (10 mg) by mouth daily 90 tablet 3     aspirin 325 MG EC tablet Take 1 tablet by mouth daily. 100 tablet 3     blood glucose (ACCU-CHEK DEIDRA PLUS) test strip Use to test blood sugar 2 times daily or as directed. 100 strip 3     brimonidine (ALPHAGAN P) 0.1 % ophthalmic solution Place 1 drop into both eyes 2 times daily.       Cholecalciferol (VITAMIN D) 2000 UNITS CAPS Take 2,000 Units by mouth.       colchicine (COLCRYS) 0.6 MG tablet Take 1 tablet daily prn gout attack 20 tablet 1     irbesartan (AVAPRO) 300 MG tablet Take 1 tablet (300 mg) by mouth At Bedtime 90 tablet 3     LORazepam (ATIVAN) 0.5 MG tablet Take 1 tablet (0.5 mg) by  "mouth daily as needed for anxiety 30 tablet 0     metFORMIN (GLUCOPHAGE-XR) 500 MG 24 hr tablet Take 2 tablets (1,000 mg) by mouth daily (with dinner) 180 tablet 3     MULTIPLE VITAMIN PO Take  by mouth. 2 childrens multi vitamins daily.        rosuvastatin (CRESTOR) 5 MG tablet Take 1 tablet (5 mg) by mouth daily 90 tablet 3     travoprost Z, benzalkonium, (TRAVATAN) 0.004 % ophthalmic solution 1 drop every evening.                 Review of Systems:   The 10 point Review of Systems is negative other than noted in the HPI           Physical Exam:   Blood pressure 134/73, pulse 86, temperature 97.7  F (36.5  C), temperature source Temporal, resp. rate 16, height 1.676 m (5' 6\"), weight 69.4 kg (153 lb), SpO2 100 %, not currently breastfeeding.    Exam:  Constitutional: healthy appearing, alert and in no distress  Heent: Normocephalic. Head without obvious masses or lesions. PERRLDC, EOMI. Mouth exam within normal limits: tongue, mucous membranes, posterior pharynx all normal, no lesions or abnormalities seen.  Tm's and canals within normal limits bilaterally. Neck supple, no nuchal rigidity or masses. No supraclavicular, or cervical adenopathy. Thyroid symmetric, no masses.  Cardiovascular: Regular rate and rhythm, no murmer, rub or gallops.  JVP not elevated, no edema.  Carotids within normal limits bilaterally, no bruits.  Respiratory: Normal respiratory effort.  Lungs clear, normal flow, no wheezing or crackles.  Gastrointestinal: Normal active bowel sounds.   Soft, not tender, no masses, guarding or rebound.  No hepatosplenomegaly.   Musculoskeletal: extremities normal, no gross deformities noted.  Skin: no suspicious lesions or rashes   Neurologic: Mental status within normal limits.  Speech fluent.  No gross motor abnormalities and gait intact.  Psychiatric: mentation appears normal and affect normal.  Feet within normal limits bilat  Hands not red, warm or tender         Data:   Labs sent        Assessment: " "  1. Normal complete physical exam  2. Niddm, no c/o, follow up labs  3. Elevated cholesterol, on statin, tolerating every other day, neg ck in past  4. Prior cvi, no issues. Med mgmt  5. Ckd, follow up labs  6. Hypertension, controlled  7. Gout, no issues  8. neurop  9. Vit d defic, follow up labs have been fine  10. hcm         Plan:   shingrix at pharm  Exercise, diet  Letter with labs  Call if more aches      Julio César Peres M.D.                  Are you in the first 12 months of your Medicare coverage?  No    Healthy Habits:     In general, how would you rate your overall health?  Good    Frequency of exercise:  None    Do you usually eat at least 4 servings of fruit and vegetables a day, include whole grains    & fiber and avoid regularly eating high fat or \"junk\" foods?  Yes    Taking medications regularly:  Yes    Medication side effects:  None    Ability to successfully perform activities of daily living:  No assistance needed    Home Safety:  No safety concerns identified    Hearing Impairment:  No hearing concerns    In the past 6 months, have you been bothered by leaking of urine?  No    In general, how would you rate your overall mental or emotional health?  Excellent      PHQ-2 Total Score: 0    Additional concerns today:  No    Do you feel safe in your environment? Yes    Have you ever done Advance Care Planning? (For example, a Health Directive, POLST, or a discussion with a medical provider or your loved ones about your wishes): Yes, advance care planning is on file.       Fall risk       Cognitive Screening   1) Repeat 3 items (Leader, Season, Table)    2) Clock draw: NORMAL  3) 3 item recall: Recalls 1 object   Results: NORMAL clock, 1-2 items recalled: COGNITIVE IMPAIRMENT LESS LIKELY    Mini-CogTM Copyright S Chani. Licensed by the author for use in [facility]; reprinted with permission (imelda@.[email or web address]). All rights reserved.      Do you have sleep apnea, excessive snoring or daytime " "drowsiness?: no    Reviewed and updated as needed this visit by clinical staff      Med Hx           Reviewed and updated as needed this visit by Provider      Med Hx          Social History     Tobacco Use     Smoking status: Never Smoker     Smokeless tobacco: Never Used   Substance Use Topics     Alcohol use: No     Alcohol/week: 0.0 standard drinks         Alcohol Use 2/8/2022   Prescreen: >3 drinks/day or >7 drinks/week? Not Applicable   Prescreen: >3 drinks/day or >7 drinks/week? -               Current providers sharing in care for this patient include:   Patient Care Team:  Julio César Peres MD as PCP - General (Internal Medicine)  Julio César Peres MD as Assigned PCP    The following health maintenance items are reviewed in Epic and correct as of today:  Health Maintenance Due   Topic Date Due     DEXA  Never done     ANNUAL REVIEW OF HM ORDERS  Never done     ZOSTER IMMUNIZATION (1 of 2) Never done             Pertinent mammograms are reviewed under the imaging tab.    Review of Systems      OBJECTIVE:   There were no vitals taken for this visit. Estimated body mass index is 25.18 kg/m  as calculated from the following:    Height as of 8/30/21: 1.676 m (5' 6\").    Weight as of 8/30/21: 70.8 kg (156 lb).  Physical Exam          ASSESSMENT / PLAN:           COUNSELING:  Reviewed preventive health counseling, as reflected in patient instructions       Regular exercise       Healthy diet/nutrition    Estimated body mass index is 25.18 kg/m  as calculated from the following:    Height as of 8/30/21: 1.676 m (5' 6\").    Weight as of 8/30/21: 70.8 kg (156 lb).        She reports that she has never smoked. She has never used smokeless tobacco.      Appropriate preventive services were discussed with this patient, including applicable screening as appropriate for cardiovascular disease, diabetes, osteopenia/osteoporosis, and glaucoma.  As appropriate for age/gender, discussed screening for colorectal " cancer, prostate cancer, breast cancer, and cervical cancer. Checklist reviewing preventive services available has been given to the patient.    Reviewed patients plan of care and provided an AVS. The Basic Care Plan (routine screening as documented in Health Maintenance) for Ashley meets the Care Plan requirement. This Care Plan has been established and reviewed with the Patient.    Counseling Resources:  ATP IV Guidelines  Pooled Cohorts Equation Calculator  Breast Cancer Risk Calculator  Breast Cancer: Medication to Reduce Risk  FRAX Risk Assessment  ICSI Preventive Guidelines  Dietary Guidelines for Americans, 2010  USDA's MyPlate  ASA Prophylaxis  Lung CA Screening    Julio César Peres MD  [facility]    Identified Health Risks:

## 2022-02-09 LAB
ANION GAP SERPL CALCULATED.3IONS-SCNC: 3 MMOL/L (ref 3–14)
BUN SERPL-MCNC: 21 MG/DL (ref 7–30)
CALCIUM SERPL-MCNC: 9.8 MG/DL (ref 8.5–10.1)
CHLORIDE BLD-SCNC: 106 MMOL/L (ref 94–109)
CHOLEST SERPL-MCNC: 154 MG/DL
CO2 SERPL-SCNC: 29 MMOL/L (ref 20–32)
CREAT SERPL-MCNC: 1.17 MG/DL (ref 0.52–1.04)
FASTING STATUS PATIENT QL REPORTED: YES
GFR SERPL CREATININE-BSD FRML MDRD: 47 ML/MIN/1.73M2
GLUCOSE BLD-MCNC: 131 MG/DL (ref 70–99)
HDLC SERPL-MCNC: 62 MG/DL
LDLC SERPL CALC-MCNC: 74 MG/DL
NONHDLC SERPL-MCNC: 92 MG/DL
POTASSIUM BLD-SCNC: 4.3 MMOL/L (ref 3.4–5.3)
SODIUM SERPL-SCNC: 138 MMOL/L (ref 133–144)
TRIGL SERPL-MCNC: 92 MG/DL

## 2022-02-09 NOTE — RESULT ENCOUNTER NOTE
It was nice to see you.  Your should be able to view the lab results.    Your tests look super.  Your chemistries are fine, stable, blood salts and kidney tests.  Your diabetes test or hemoglobin a1c is improved, your blood count is normal and your cholesterol is excellent.    If you have any questions please call me.    Julio César Peres M.D.

## 2022-08-08 NOTE — PROGRESS NOTES
This is a very pleasant 81-year-old here for follow-up on multiple issues.    Patient has diabetes.  She checks her sugars daily and they range from 90-1 20.  She is up-to-date on her eye exam and has no sores on her feet or toes.  She does not exercise regularly and we discussed the need to do this.    Patient has hypertension.  Her blood pressure here was a bit high but on repeat it is better and at home it is quite good.    Patient has hyperlipidemia.  She has had some aches with statins and now uses Crestor every other day but does have some slight aching in bed at night.  Not during the day.  She wonders about the use of coenzyme Q 10.    The patient has noticed some very slight intermittent low back pain on either side.  It does not radiate.  Its been about 2 weeks.  No fevers.  No GI or  symptoms.    Patient has anxiety and uses rare Ativan and needs a refill.    Past Medical History:   Diagnosis Date     Anxiety     rare ativan use     Atypical chest pain 2013    neg est echo     Chest tightness 03/2019    neg est echo     CKD (chronic kidney disease) stage 3, GFR 30-59 ml/min (H)      Dizziness 2014    neg est echo     Glaucoma      Gout attack 06/29/2015, 9/19    elevated uric acid     HTN (hypertension)     stopped hctz 2015 due to gout, side effects with aldactone     Hx of colonoscopy 2008, 2018    tics     Hypercholesteremia     added rx 12/12, aches with zocor, then added lipitor 2015, aches with lipitor 2020, changed to crestor 12/20     OA (osteoarthritis)      Peripheral neuropathy      Stroke (H) 2004    intimal dissection     Type II or unspecified type diabetes mellitus without mention of complication, not stated as uncontrolled     diet controlled; added metformin 9/16/19     Vertigo 2004    hosp fsd, mri pos     Vitamin D deficiency 2013     Past Surgical History:   Procedure Laterality Date     COLONOSCOPY N/A 8/23/2018    Procedure: COLONOSCOPY;  colonoscopy;  Surgeon: Alexei Lea  MD Herber;  Location:  GI     ECTOPIC PREGNANCY SURGERY  80's     HYSTERECTOMY, PAP NO LONGER INDICATED  1980    fibriods and endometriosis, maggy and bso     Social History     Socioeconomic History     Marital status:      Spouse name: Not on file     Number of children: 2     Years of education: Not on file     Highest education level: Not on file   Occupational History     Occupation: retired, worked for [employer]   Tobacco Use     Smoking status: Never Smoker     Smokeless tobacco: Never Used   Substance and Sexual Activity     Alcohol use: No     Alcohol/week: 0.0 standard drinks     Drug use: No     Sexual activity: Not Currently   Other Topics Concern     Parent/sibling w/ CABG, MI or angioplasty before 65F 55M? Not Asked   Social History Narrative     Not on file     Social Determinants of Health     Financial Resource Strain: Not on file   Food Insecurity: Not on file   Transportation Needs: Not on file   Physical Activity: Not on file   Stress: Not on file   Social Connections: Not on file   Intimate Partner Violence: Not on file   Housing Stability: Not on file     Current Outpatient Medications   Medication Sig Dispense Refill     ACCU-CHEK FASTCLIX LANCETS MISC 1 each daily. 1 Box 3     amLODIPine (NORVASC) 10 MG tablet Take 1 tablet (10 mg) by mouth daily 90 tablet 3     aspirin 325 MG EC tablet Take 1 tablet by mouth daily. 100 tablet 3     blood glucose (ACCU-CHEK DEIDRA PLUS) test strip Use to test blood sugar 2 times daily or as directed. 100 strip 3     brimonidine (ALPHAGAN P) 0.1 % ophthalmic solution Place 1 drop into both eyes 2 times daily.       Cholecalciferol (VITAMIN D) 2000 UNITS CAPS Take 2,000 Units by mouth.       colchicine (COLCRYS) 0.6 MG tablet Take 1 tablet daily prn gout attack 20 tablet 1     irbesartan (AVAPRO) 300 MG tablet Take 1 tablet (300 mg) by mouth At Bedtime 90 tablet 3     LORazepam (ATIVAN) 0.5 MG tablet Take 1 tablet (0.5 mg) by mouth daily as needed for  "anxiety 30 tablet 0     metFORMIN (GLUCOPHAGE-XR) 500 MG 24 hr tablet Take 2 tablets (1,000 mg) by mouth daily (with dinner) 180 tablet 3     MULTIPLE VITAMIN PO Take  by mouth. 2 childrens multi vitamins daily.        rosuvastatin (CRESTOR) 5 MG tablet Take 1 tablet (5 mg) by mouth daily 90 tablet 3     travoprost Z, benzalkonium, (TRAVATAN) 0.004 % ophthalmic solution 1 drop every evening.       No Known Allergies  FAMILY HISTORY NOTED AND REVIEWED    REVIEW OF SYSTEMS: above    PHYSICAL EXAM    /72   Pulse 70   Temp 97  F (36.1  C) (Tympanic)   Resp 16   Ht 1.676 m (5' 6\")   Wt 69.9 kg (154 lb)   SpO2 98%   Breastfeeding No   BMI 24.86 kg/m      Patient appears non toxic  Lungs - clear, normal flow  Cardiovascular - regular rate and rhythm, no murmer, rub or gallop, no jvp or edema, carotids within normal limits, no bruits.  Abdomen - normal active bowel sounds, soft, non tender, no masses, guarding or rebound, no hepatosplenomegaly    Labs sent    ASSESSMENT:  1. Diabetes with c/o, sugars fine, follow up labs today  2. Hypertension, blood pressure controlled  3. Elevated cholesterol, on statin every other day  4. Myalgias, ck neg, may be statin, doubt rhabdo, try coenzyme q10, call if not better then stop statin  5. Low back pain, suspect msk, doubt gi or genitourinary, tumor  6. Anxiety, mild  7. ckd    PLAN:  prevnar 20  Letter with labs  Heat to lb, call if not gone soon  Try coenzyme q10 and call if aches not resolving        Julio César Peres M.D.          "

## 2022-08-09 ENCOUNTER — OFFICE VISIT (OUTPATIENT)
Dept: FAMILY MEDICINE | Facility: CLINIC | Age: 82
End: 2022-08-09
Payer: COMMERCIAL

## 2022-08-09 VITALS
SYSTOLIC BLOOD PRESSURE: 138 MMHG | HEART RATE: 70 BPM | OXYGEN SATURATION: 98 % | RESPIRATION RATE: 16 BRPM | HEIGHT: 66 IN | BODY MASS INDEX: 24.75 KG/M2 | TEMPERATURE: 97 F | WEIGHT: 154 LBS | DIASTOLIC BLOOD PRESSURE: 72 MMHG

## 2022-08-09 DIAGNOSIS — N18.31 TYPE 2 DIABETES MELLITUS WITH STAGE 3A CHRONIC KIDNEY DISEASE, WITHOUT LONG-TERM CURRENT USE OF INSULIN (H): ICD-10-CM

## 2022-08-09 DIAGNOSIS — N18.30 STAGE 3 CHRONIC KIDNEY DISEASE, UNSPECIFIED WHETHER STAGE 3A OR 3B CKD (H): ICD-10-CM

## 2022-08-09 DIAGNOSIS — Z23 NEED FOR VACCINATION: Primary | ICD-10-CM

## 2022-08-09 DIAGNOSIS — M79.10 MYALGIA: ICD-10-CM

## 2022-08-09 DIAGNOSIS — M54.50 BILATERAL LOW BACK PAIN WITHOUT SCIATICA, UNSPECIFIED CHRONICITY: ICD-10-CM

## 2022-08-09 DIAGNOSIS — E78.5 HYPERLIPIDEMIA LDL GOAL <100: ICD-10-CM

## 2022-08-09 DIAGNOSIS — I10 ESSENTIAL HYPERTENSION, BENIGN: ICD-10-CM

## 2022-08-09 DIAGNOSIS — F41.9 ANXIETY: ICD-10-CM

## 2022-08-09 DIAGNOSIS — E11.22 TYPE 2 DIABETES MELLITUS WITH STAGE 3A CHRONIC KIDNEY DISEASE, WITHOUT LONG-TERM CURRENT USE OF INSULIN (H): ICD-10-CM

## 2022-08-09 LAB
ANION GAP SERPL CALCULATED.3IONS-SCNC: 8 MMOL/L (ref 3–14)
BUN SERPL-MCNC: 18 MG/DL (ref 7–30)
CALCIUM SERPL-MCNC: 9.6 MG/DL (ref 8.5–10.1)
CHLORIDE BLD-SCNC: 109 MMOL/L (ref 94–109)
CHOLEST SERPL-MCNC: 134 MG/DL
CO2 SERPL-SCNC: 23 MMOL/L (ref 20–32)
CREAT SERPL-MCNC: 0.99 MG/DL (ref 0.52–1.04)
FASTING STATUS PATIENT QL REPORTED: YES
GFR SERPL CREATININE-BSD FRML MDRD: 58 ML/MIN/1.73M2
GLUCOSE BLD-MCNC: 121 MG/DL (ref 70–99)
HBA1C MFR BLD: 6.3 % (ref 0–5.6)
HDLC SERPL-MCNC: 57 MG/DL
LDLC SERPL CALC-MCNC: 59 MG/DL
NONHDLC SERPL-MCNC: 77 MG/DL
POTASSIUM BLD-SCNC: 4.7 MMOL/L (ref 3.4–5.3)
SODIUM SERPL-SCNC: 140 MMOL/L (ref 133–144)
TRIGL SERPL-MCNC: 88 MG/DL

## 2022-08-09 PROCEDURE — 80061 LIPID PANEL: CPT | Performed by: INTERNAL MEDICINE

## 2022-08-09 PROCEDURE — 83036 HEMOGLOBIN GLYCOSYLATED A1C: CPT | Performed by: INTERNAL MEDICINE

## 2022-08-09 PROCEDURE — 82043 UR ALBUMIN QUANTITATIVE: CPT | Performed by: INTERNAL MEDICINE

## 2022-08-09 PROCEDURE — 80048 BASIC METABOLIC PNL TOTAL CA: CPT | Performed by: INTERNAL MEDICINE

## 2022-08-09 PROCEDURE — 99214 OFFICE O/P EST MOD 30 MIN: CPT | Mod: 25 | Performed by: INTERNAL MEDICINE

## 2022-08-09 PROCEDURE — 36415 COLL VENOUS BLD VENIPUNCTURE: CPT | Performed by: INTERNAL MEDICINE

## 2022-08-09 PROCEDURE — 90677 PCV20 VACCINE IM: CPT | Performed by: INTERNAL MEDICINE

## 2022-08-09 PROCEDURE — 90471 IMMUNIZATION ADMIN: CPT | Performed by: INTERNAL MEDICINE

## 2022-08-09 RX ORDER — LORAZEPAM 0.5 MG/1
0.5 TABLET ORAL DAILY PRN
Qty: 30 TABLET | Refills: 0 | Status: SHIPPED | OUTPATIENT
Start: 2022-08-09 | End: 2023-03-14

## 2022-08-09 NOTE — NURSING NOTE
Prior to immunization administration, verified patients identity using patient s name and date of birth. Please see Immunization Activity for additional information.     Screening Questionnaire for Adult Immunization    Are you sick today?   No   Do you have allergies to medications, food, a vaccine component or latex?   No   Have you ever had a serious reaction after receiving a vaccination?   No   Do you have a long-term health problem with heart, lung, kidney, or metabolic disease (e.g., diabetes), asthma, a blood disorder, no spleen, complement component deficiency, a cochlear implant, or a spinal fluid leak?  Are you on long-term aspirin therapy?   No   Do you have cancer, leukemia, HIV/AIDS, or any other immune system problem?   No   Do you have a parent, brother, or sister with an immune system problem?   No   In the past 3 months, have you taken medications that affect  your immune system, such as prednisone, other steroids, or anticancer drugs; drugs for the treatment of rheumatoid arthritis, Crohn s disease, or psoriasis; or have you had radiation treatments?   No   Have you had a seizure, or a brain or other nervous system problem?   No   During the past year, have you received a transfusion of blood or blood    products, or been given immune (gamma) globulin or antiviral drug?   No   For women: Are you pregnant or is there a chance you could become       pregnant during the next month?   No   Have you received any vaccinations in the past 4 weeks?   No     Immunization questionnaire answers were all negative.        Per orders of Dr. Peres, injection of Prevnar 20 given by Brenda Pena CMA. Patient instructed to remain in clinic for 15 minutes afterwards, and to report any adverse reaction to me immediately.       Screening performed by Brenda Pena CMA on 8/9/2022 at 9:19 AM.

## 2022-08-09 NOTE — NURSING NOTE
"Ashley Liu is a 81 year old patient who comes in today for a Blood Pressure check.  Initial BP:  BP (!) 148/76 (BP Location: Left arm, Patient Position: Chair, Cuff Size: Adult Large)   Pulse 70   Temp 97  F (36.1  C) (Tympanic)   Resp 16   Ht 1.676 m (5' 6\")   Wt 69.9 kg (154 lb)   SpO2 98%   Breastfeeding No   BMI 24.86 kg/m       70  Disposition: provider notified while patient in the clinic  Brenda Martines CMA      "

## 2022-08-09 NOTE — PATIENT INSTRUCTIONS
Try taking coenzyme q10 daily and let me know if the aches do not improve over the next 2 weeks.    Try using heat to the back 4x a day for 15 minutes and let me know if this does not solve the ache.    Julio César Peres M.D.

## 2022-08-10 LAB
CREAT UR-MCNC: 242 MG/DL
MICROALBUMIN UR-MCNC: 18 MG/L
MICROALBUMIN/CREAT UR: 7.44 MG/G CR (ref 0–25)

## 2022-08-10 NOTE — RESULT ENCOUNTER NOTE
It was nice to see you.  Your should be able to view the lab results.    Your labs look super.  The a1c is very good, your cholesterol is excellent, the urine is clear and your blood salts and kidney tests are normal.    Let me know if you have questions.    Julio César Peres M.D.

## 2022-08-25 DIAGNOSIS — E11.9 TYPE 2 DIABETES MELLITUS WITHOUT COMPLICATION, WITHOUT LONG-TERM CURRENT USE OF INSULIN (H): ICD-10-CM

## 2022-08-26 RX ORDER — BLOOD SUGAR DIAGNOSTIC
STRIP MISCELLANEOUS
Qty: 100 STRIP | Refills: 3 | Status: SHIPPED | OUTPATIENT
Start: 2022-08-26 | End: 2023-08-07

## 2022-10-22 ENCOUNTER — HEALTH MAINTENANCE LETTER (OUTPATIENT)
Age: 82
End: 2022-10-22

## 2022-11-07 ENCOUNTER — TRANSFERRED RECORDS (OUTPATIENT)
Dept: HEALTH INFORMATION MANAGEMENT | Facility: CLINIC | Age: 82
End: 2022-11-07

## 2022-11-07 LAB — RETINOPATHY: NEGATIVE

## 2022-12-30 ENCOUNTER — TRANSFERRED RECORDS (OUTPATIENT)
Dept: HEALTH INFORMATION MANAGEMENT | Facility: CLINIC | Age: 82
End: 2022-12-30

## 2023-02-09 ENCOUNTER — OFFICE VISIT (OUTPATIENT)
Dept: FAMILY MEDICINE | Facility: CLINIC | Age: 83
End: 2023-02-09
Payer: COMMERCIAL

## 2023-02-09 VITALS
SYSTOLIC BLOOD PRESSURE: 146 MMHG | HEART RATE: 87 BPM | WEIGHT: 153 LBS | DIASTOLIC BLOOD PRESSURE: 66 MMHG | RESPIRATION RATE: 16 BRPM | TEMPERATURE: 97.7 F | OXYGEN SATURATION: 99 % | BODY MASS INDEX: 24.59 KG/M2 | HEIGHT: 66 IN

## 2023-02-09 DIAGNOSIS — E11.22 TYPE 2 DIABETES MELLITUS WITH STAGE 3A CHRONIC KIDNEY DISEASE, WITHOUT LONG-TERM CURRENT USE OF INSULIN (H): Primary | ICD-10-CM

## 2023-02-09 DIAGNOSIS — E55.9 VITAMIN D DEFICIENCY: ICD-10-CM

## 2023-02-09 DIAGNOSIS — E78.5 HYPERLIPIDEMIA LDL GOAL <100: ICD-10-CM

## 2023-02-09 DIAGNOSIS — F41.9 ANXIETY: ICD-10-CM

## 2023-02-09 DIAGNOSIS — I10 ESSENTIAL HYPERTENSION, BENIGN: ICD-10-CM

## 2023-02-09 DIAGNOSIS — N18.30 STAGE 3 CHRONIC KIDNEY DISEASE, UNSPECIFIED WHETHER STAGE 3A OR 3B CKD (H): ICD-10-CM

## 2023-02-09 DIAGNOSIS — N18.31 TYPE 2 DIABETES MELLITUS WITH STAGE 3A CHRONIC KIDNEY DISEASE, WITHOUT LONG-TERM CURRENT USE OF INSULIN (H): Primary | ICD-10-CM

## 2023-02-09 LAB
ANION GAP SERPL CALCULATED.3IONS-SCNC: 12 MMOL/L (ref 7–15)
BUN SERPL-MCNC: 19.1 MG/DL (ref 8–23)
CALCIUM SERPL-MCNC: 9.4 MG/DL (ref 8.8–10.2)
CHLORIDE SERPL-SCNC: 107 MMOL/L (ref 98–107)
CREAT SERPL-MCNC: 0.99 MG/DL (ref 0.51–0.95)
DEPRECATED HCO3 PLAS-SCNC: 24 MMOL/L (ref 22–29)
ERYTHROCYTE [DISTWIDTH] IN BLOOD BY AUTOMATED COUNT: 13.7 % (ref 10–15)
GFR SERPL CREATININE-BSD FRML MDRD: 57 ML/MIN/1.73M2
GLUCOSE SERPL-MCNC: 128 MG/DL (ref 70–99)
HBA1C MFR BLD: 6.5 % (ref 0–5.6)
HCT VFR BLD AUTO: 40.5 % (ref 35–47)
HGB BLD-MCNC: 13.4 G/DL (ref 11.7–15.7)
MCH RBC QN AUTO: 30.5 PG (ref 26.5–33)
MCHC RBC AUTO-ENTMCNC: 33.1 G/DL (ref 31.5–36.5)
MCV RBC AUTO: 92 FL (ref 78–100)
PLATELET # BLD AUTO: 313 10E3/UL (ref 150–450)
POTASSIUM SERPL-SCNC: 3.9 MMOL/L (ref 3.4–5.3)
RBC # BLD AUTO: 4.39 10E6/UL (ref 3.8–5.2)
SODIUM SERPL-SCNC: 143 MMOL/L (ref 136–145)
WBC # BLD AUTO: 6.1 10E3/UL (ref 4–11)

## 2023-02-09 PROCEDURE — 85027 COMPLETE CBC AUTOMATED: CPT | Performed by: INTERNAL MEDICINE

## 2023-02-09 PROCEDURE — 80048 BASIC METABOLIC PNL TOTAL CA: CPT | Performed by: INTERNAL MEDICINE

## 2023-02-09 PROCEDURE — 36415 COLL VENOUS BLD VENIPUNCTURE: CPT | Performed by: INTERNAL MEDICINE

## 2023-02-09 PROCEDURE — 83036 HEMOGLOBIN GLYCOSYLATED A1C: CPT | Performed by: INTERNAL MEDICINE

## 2023-02-09 PROCEDURE — 99214 OFFICE O/P EST MOD 30 MIN: CPT | Performed by: INTERNAL MEDICINE

## 2023-02-09 RX ORDER — METFORMIN HCL 500 MG
1000 TABLET, EXTENDED RELEASE 24 HR ORAL
Qty: 180 TABLET | Refills: 3 | Status: SHIPPED | OUTPATIENT
Start: 2023-02-09 | End: 2023-08-07

## 2023-02-09 RX ORDER — AMLODIPINE BESYLATE 10 MG/1
10 TABLET ORAL DAILY
Qty: 90 TABLET | Refills: 3 | Status: SHIPPED | OUTPATIENT
Start: 2023-02-09 | End: 2023-08-07

## 2023-02-09 RX ORDER — IRBESARTAN 300 MG/1
300 TABLET ORAL AT BEDTIME
Qty: 90 TABLET | Refills: 3 | Status: SHIPPED | OUTPATIENT
Start: 2023-02-09 | End: 2023-08-07

## 2023-02-09 ASSESSMENT — PAIN SCALES - GENERAL: PAINLEVEL: NO PAIN (0)

## 2023-02-09 NOTE — PATIENT INSTRUCTIONS
Try stopping the rosuvastatin and let's see if the aches improve.  It can take up to 4 weeks off of it to get better.    I would recommend getting the new shingles shot called shingrix, but I would do it at your pharmacy as they can check with the insurance company to see if it is paid for.    I would also get a tetanus shot at your pharmacy.    Julio César Peres M.D.

## 2023-02-09 NOTE — RESULT ENCOUNTER NOTE
It was very nice to see you today.  Your labs look quite good.    Your CBC or complete blood count is normal with no signs of anemia or blood disorders.  Your hemoglobin A1c is excellent at 6.5.  Your blood salts and kidney tests also look quite good.    Please let me know if you have questions.    Julio César Peres M.D.

## 2023-02-09 NOTE — PROGRESS NOTES
Ledy Ramirez is a 82 year old, presenting for the following health issues:  No chief complaint on file.      History of Present Illness       Diabetes:   She presents for follow up of diabetes.  She is checking home blood glucose one time daily. She checks blood glucose before meals.  Blood glucose is never over 200 and never under 70. She is aware of hypoglycemia symptoms including other. She has no concerns regarding her diabetes at this time.  She is not experiencing numbness or burning in feet, excessive thirst, blurry vision, weight changes or redness, sores or blisters on feet.         Hypertension: She presents for follow up of hypertension.  She does check blood pressure  regularly outside of the clinic. Outpatient blood pressures have not been over 140/90. She follows a low salt diet.       The patient is doing quite well.  She checks her sugars daily and they tend to range from 80-90.  She is up-to-date on her eye exam and has no sores on her feet or toes.  Her last A1c was quite good.    The patient has CKD as well as hypertension.  At home her blood pressure readings are very good.  She has hyperlipidemia and has been intolerant of some statins.  She does note some aches.  Its been a long time, years, where in bed at night she can have aching in her shoulders.  During the day she does not have it.    She has anxiety which is controlled.  Prior vitamin D deficiency.    No other complaints on review of systems.    Past Medical History:   Diagnosis Date     Anxiety     rare ativan use     Atypical chest pain 2013    neg est echo     Chest tightness 03/2019    neg est echo     CKD (chronic kidney disease) stage 3, GFR 30-59 ml/min (H)      Dizziness 2014    neg est echo     Glaucoma      Gout attack 06/29/2015, 9/19    elevated uric acid     HTN (hypertension)     stopped hctz 2015 due to gout, side effects with aldactone     Hx of colonoscopy 2008, 2018    tics     Hypercholesteremia     added  rx 12/12, aches with zocor, then added lipitor 2015, aches with lipitor 2020, changed to crestor 12/20     OA (osteoarthritis)      Peripheral neuropathy      Stroke (H) 2004    intimal dissection     Type II or unspecified type diabetes mellitus without mention of complication, not stated as uncontrolled     diet controlled; added metformin 9/16/19     Vertigo 2004    hosp fsd, mri pos     Vitamin D deficiency 2013     Past Surgical History:   Procedure Laterality Date     COLONOSCOPY N/A 8/23/2018    Procedure: COLONOSCOPY;  colonoscopy;  Surgeon: Alexei Lea MD;  Location:  GI     ECTOPIC PREGNANCY SURGERY  80's     HYSTERECTOMY, PAP NO LONGER INDICATED  1980    fibriods and endometriosis, maggy and bso     Social History     Socioeconomic History     Marital status:      Spouse name: Not on file     Number of children: 2     Years of education: Not on file     Highest education level: Not on file   Occupational History     Occupation: retired, worked for [employer]   Tobacco Use     Smoking status: Never     Smokeless tobacco: Never   Substance and Sexual Activity     Alcohol use: No     Alcohol/week: 0.0 standard drinks     Drug use: No     Sexual activity: Not Currently   Other Topics Concern     Parent/sibling w/ CABG, MI or angioplasty before 65F 55M? Not Asked   Social History Narrative     Not on file     Social Determinants of Health     Financial Resource Strain: Not on file   Food Insecurity: Not on file   Transportation Needs: Not on file   Physical Activity: Not on file   Stress: Not on file   Social Connections: Not on file   Intimate Partner Violence: Not on file   Housing Stability: Not on file     Current Outpatient Medications   Medication Sig Dispense Refill     ACCU-CHEK DEIDRA PLUS test strip USE TO TEST BLOOD SUGAR 2 TIMES DAILY OR AS DIRECTED. 100 strip 3     ACCU-CHEK FASTCLIX LANCETS MISC 1 each daily. 1 Box 3     amLODIPine (NORVASC) 10 MG tablet Take 1 tablet (10 mg) by mouth  "daily 90 tablet 3     aspirin 325 MG EC tablet Take 1 tablet by mouth daily. 100 tablet 3     brimonidine (ALPHAGAN P) 0.1 % ophthalmic solution Place 1 drop into both eyes 2 times daily.       Cholecalciferol (VITAMIN D) 2000 UNITS CAPS Take 2,000 Units by mouth.       colchicine (COLCRYS) 0.6 MG tablet Take 1 tablet daily prn gout attack 20 tablet 1     irbesartan (AVAPRO) 300 MG tablet Take 1 tablet (300 mg) by mouth At Bedtime 90 tablet 3     LORazepam (ATIVAN) 0.5 MG tablet Take 1 tablet (0.5 mg) by mouth daily as needed for anxiety 30 tablet 0     metFORMIN (GLUCOPHAGE XR) 500 MG 24 hr tablet Take 2 tablets (1,000 mg) by mouth daily (with dinner) 180 tablet 3     MULTIPLE VITAMIN PO Take  by mouth. 2 childrens multi vitamins daily.        rosuvastatin (CRESTOR) 5 MG tablet Take 1 tablet (5 mg) by mouth daily 90 tablet 3     UNABLE TO FIND MEDICATION NAME: VYZULTA       travoprost Z (benzalkonium) (TRAVATAN Z) 0.004 % ophthalmic solution 1 drop every evening.       No Known Allergies  FAMILY HISTORY NOTED AND REVIEWED    REVIEW OF SYSTEMS: above    PHYSICAL EXAM    BP (!) 146/66   Pulse 87   Temp 97.7  F (36.5  C) (Temporal)   Resp 16   Ht 1.676 m (5' 6\")   Wt 69.4 kg (153 lb)   SpO2 99%   BMI 24.69 kg/m      Patient appears non toxic  Lungs - clear, normal flow  Cardiovascular - regular rate and rhythm, no murmer, rub or gallop, no jvp or edema, carotids within normal limits, no bruits.  Abdomen - normal active bowel sounds, soft, non tender, no masses, guarding or rebound, no hepatosplenomegaly  Feet within normal limits bilat    Labs sent    ASSESSMENT:  1. Niddm with ckd, control fine, follow up labs today  2. Ckd, stable, blood pressure fine, on arb  3. Anxiety, controlled  4. Elevated cholesterol, on statin  5. Muscle pain, suspect statin  6. Vit d defic    PLAN:  shingrix and td at pharm  Stop crestor to see if aches resolve  Letter with labs  Follow up 6 months      Julio César Peres M.D.          "

## 2023-03-14 ENCOUNTER — VIRTUAL VISIT (OUTPATIENT)
Dept: FAMILY MEDICINE | Facility: CLINIC | Age: 83
End: 2023-03-14
Payer: COMMERCIAL

## 2023-03-14 ENCOUNTER — NURSE TRIAGE (OUTPATIENT)
Dept: FAMILY MEDICINE | Facility: CLINIC | Age: 83
End: 2023-03-14

## 2023-03-14 DIAGNOSIS — E78.5 HYPERLIPIDEMIA LDL GOAL <100: ICD-10-CM

## 2023-03-14 DIAGNOSIS — U07.1 INFECTION DUE TO 2019 NOVEL CORONAVIRUS: Primary | ICD-10-CM

## 2023-03-14 PROCEDURE — 99213 OFFICE O/P EST LOW 20 MIN: CPT | Mod: 95 | Performed by: INTERNAL MEDICINE

## 2023-03-14 NOTE — TELEPHONE ENCOUNTER
3/14 tested positive patient scheduled for visit due to colchicine on med list     RN COVID TREATMENT VISIT  03/14/23      The patient has been triaged and does not require a higher level of care.    Ashley Liu  82 year old  Current weight? 152    Has the patient been seen by a primary care provider at an [facility] or [facility] within the past two years? Yes.   Have you been in close proximity to/do you have a known exposure to a person with a confirmed case of influenza? No.     General treatment eligibility:  Date of positive COVID test (PCR or at home)?  3/14    Are you or have you been hospitalized for this COVID-19 infection? No.   Have you received monoclonal antibodies or antiviral treatment for COVID-19 since this positive test? No.   Do you have any of the following conditions that place you at risk of being very sick from COVID-19?   - Age 50 years or older  - Chronic kidney disease (mild to moderate; eGFR or GFR 30-59 mL/min)  - Diabetes mellitus, type 1 and type 2  Yes, patient has at least one high risk condition as noted above.     Current COVID symptoms:   - cough  - fatigue  - headache  - congestion or runny nose  Yes. Patient has at least one symptom as selected.     How many days since symptoms started? 5 days or less. Established patient, 12 years or older weighing at least 88.2 lbs, who has symptoms that started in the past 5 days, has not been hospitalized nor received treatment already, and is at risk for being very sick from COVID-19.     Treatment eligibility by RN:    Are you currently pregnant or nursing? No    Do you have a clinically significant hypersensitivity to nirmatrelvir or ritonavir, or toxic epidermal necrolysis (TEN) or Calloway-Pro Syndrome? No    Do you have a history of hepatitis, any hepatic impairment on the Problem List (such as Child-Jean Class C, cirrhosis, fatty liver disease, alcoholic liver disease), or was the last liver lab  (hepatic panel, ALT, AST, ALK Phos, bilirubin) elevated in the past 6 months? No    Do you have any history of severe renal impairment (eGFR < 30mL/min)? No    Is patient eligible to continue?   Yes, patient meets all eligibility requirements for the RN COVID treatment (as denoted by all no responses above).     Current Outpatient Medications   Medication Sig Dispense Refill     ACCU-CHEK DEIDRA PLUS test strip USE TO TEST BLOOD SUGAR 2 TIMES DAILY OR AS DIRECTED. 100 strip 3     ACCU-CHEK FASTCLIX LANCETS MISC 1 each daily. 1 Box 3     amLODIPine (NORVASC) 10 MG tablet Take 1 tablet (10 mg) by mouth daily 90 tablet 3     aspirin 325 MG EC tablet Take 1 tablet by mouth daily. 100 tablet 3     brimonidine (ALPHAGAN P) 0.1 % ophthalmic solution Place 1 drop into both eyes 2 times daily.       Cholecalciferol (VITAMIN D) 2000 UNITS CAPS Take 2,000 Units by mouth.       colchicine (COLCRYS) 0.6 MG tablet Take 1 tablet daily prn gout attack 20 tablet 1     irbesartan (AVAPRO) 300 MG tablet Take 1 tablet (300 mg) by mouth At Bedtime 90 tablet 3     LORazepam (ATIVAN) 0.5 MG tablet Take 1 tablet (0.5 mg) by mouth daily as needed for anxiety 30 tablet 0     metFORMIN (GLUCOPHAGE XR) 500 MG 24 hr tablet Take 2 tablets (1,000 mg) by mouth daily (with dinner) 180 tablet 3     MULTIPLE VITAMIN PO Take  by mouth. 2 childrens multi vitamins daily.        rosuvastatin (CRESTOR) 5 MG tablet Take 1 tablet (5 mg) by mouth daily 90 tablet 3     travoprost Z (benzalkonium) (TRAVATAN Z) 0.004 % ophthalmic solution 1 drop every evening.       UNABLE TO FIND MEDICATION NAME: VYZULTA         Medications from List 1 of the standing order (on medications that exclude the use of Paxlovid) that patient is taking: NONE. Is patient taking Neshkoro's Wort? No  Is patient taking Skip's Wort or any meds from List 1? No.   Medications from List 2 of the standing order (on meds that provider needs to adjust) that patient is taking: colchicine  (Colcrys) (if taking daily/for flare), explained a provider visit is necessary to discuss medication adjustments while taking Paxlovid. Is patient on any of the meds from List 2? Yes. Patient will be scheduled or transferred to a  at the end of this call.   Ryan Green RN                Reason for Disposition    [1] HIGH RISK for severe COVID complications (e.g., weak immune system, age > 64 years, obesity with BMI of 30 or higher, pregnant, chronic lung disease or other chronic medical condition) AND [2] COVID symptoms (e.g., cough, fever)  (Exceptions: Already seen by PCP and no new or worsening symptoms.)    Additional Information    Negative: SEVERE difficulty breathing (e.g., struggling for each breath, speaks in single words)    Negative: Difficult to awaken or acting confused (e.g., disoriented, slurred speech)    Negative: Bluish (or gray) lips or face now    Negative: Shock suspected (e.g., cold/pale/clammy skin, too weak to stand, low BP, rapid pulse)    Negative: Sounds like a life-threatening emergency to the triager    Negative: [1] Diagnosed or suspected COVID-19 AND [2] symptoms lasting 3 or more weeks    Negative: [1] COVID-19 exposure AND [2] no symptoms    Negative: COVID-19 vaccine reaction suspected (e.g., fever, headache, muscle aches) occurring 1 to 3 days after getting vaccine    Negative: COVID-19 vaccine, questions about    Negative: [1] Lives with someone known to have influenza (flu test positive) AND [2] flu-like symptoms (e.g., cough, runny nose, sore throat, SOB; with or without fever)    Negative: [1] Adult with possible COVID-19 symptoms AND [2] triager concerned about severity of symptoms or other causes    Negative: COVID-19 and breastfeeding, questions about    Negative: SEVERE or constant chest pain or pressure  (Exception: Mild central chest pain, present only when coughing.)    Negative: MODERATE difficulty breathing (e.g., speaks in phrases, SOB even at rest, pulse  100-120)    Negative: Headache and stiff neck (can't touch chin to chest)    Negative: Oxygen level (e.g., pulse oximetry) 90 percent or lower    Negative: Chest pain or pressure  (Exception: MILD central chest pain, present only when coughing)    Negative: Patient sounds very sick or weak to the triager    Negative: [1] Fever > 100.0 F (37.8 C) AND [2] bedridden (e.g., nursing home patient, CVA, chronic illness, recovering from surgery)    Negative: [1] Fever > 101 F (38.3 C) AND [2] over 60 years of age    Negative: Fever > 103 F (39.4 C)    Negative: MILD difficulty breathing (e.g., minimal/no SOB at rest, SOB with walking, pulse <100)    Protocols used: CORONAVIRUS (COVID-19) DIAGNOSED OR [id number]

## 2023-03-14 NOTE — PROGRESS NOTES
Ashley is a 82 year old who is being evaluated via a billable telephone visit.      What phone number would you like to be contacted at?   How would you like to obtain your AVS? [facility]  Distant Location (provider location):  On-site    Assessment & Plan     Infection due to 2019 novel coronavirus  Options discussed including watchful waiting. Her covid vaccination is up to date. This is her first go around with Covid.  She feels rather poorly and would like to proceed with medication.    Risks and side effects along with return precautions discussed.  Renally dosed.    - nirmatrelvir and ritonavir (PAXLOVID) 150 mg/100 mg therapy pack  Dispense: 20 tablet; Refill: 0    Hyperlipidemia LDL goal <100  She has not been taking Crestor for a few weeks.  This will be advantageous in the moment given interaction with paxlovid.     Of note, she is not taking colcrys and hasn't for some time.        No follow-ups on file.    Renaldo Nunes MD  [facility]   Ashley is a 82 year old, presenting for the following health issues:  Covid Concern    Informed of Covid exposure Friday.  On Monday she began with cough, headache and low grade temp along with chills.      Today she took home test.  Taking Tylenol with some relief.   Feels run down, fatigue.  Interested in intervention.      History of Present Illness       Reason for visit:  Covid    She eats 2-3 servings of fruits and vegetables daily.She consumes 0 sweetened beverage(s) daily.   She is taking medications regularly.         COVID-19 Symptom Review  How many days ago did these symptoms start? 1 1/2 days    Are any of the following symptoms significant for you?    New or worsening difficulty breathing? No    Worsening cough? Yes, I am coughing up mucus.    Fever or chills? Yes, I felt feverish or had chills.    Headache: No    Sore throat: No    Chest pain: No    Diarrhea: No    Body aches? YES    What treatments has patient tried?  Acetaminophen   Does patient live in a nursing home, group home, or shelter? No  Does patient have a way to get food/medications during quarantined? Yes, I have a friend or family member who can help me.    3/13        Review of Systems         Objective           Vitals:  No vitals were obtained today due to virtual visit.    Physical Exam   healthy, alert and no distress  PSYCH: Alert and oriented times 3; coherent speech, normal   rate and volume, able to articulate logical thoughts, able   to abstract reason, no tangential thoughts, no hallucinations   or delusions  Her affect is normal  RESP: No cough, no audible wheezing, able to talk in full sentences  Remainder of exam unable to be completed due to telephone visits            Phone call duration: 14 minutes

## 2023-04-01 ENCOUNTER — HEALTH MAINTENANCE LETTER (OUTPATIENT)
Age: 83
End: 2023-04-01

## 2023-08-07 ENCOUNTER — APPOINTMENT (OUTPATIENT)
Dept: LAB | Facility: CLINIC | Age: 83
End: 2023-08-07
Payer: COMMERCIAL

## 2023-08-07 ENCOUNTER — OFFICE VISIT (OUTPATIENT)
Dept: FAMILY MEDICINE | Facility: CLINIC | Age: 83
End: 2023-08-07
Payer: COMMERCIAL

## 2023-08-07 VITALS
DIASTOLIC BLOOD PRESSURE: 74 MMHG | TEMPERATURE: 98 F | SYSTOLIC BLOOD PRESSURE: 150 MMHG | WEIGHT: 151.5 LBS | RESPIRATION RATE: 16 BRPM | OXYGEN SATURATION: 99 % | HEART RATE: 67 BPM | HEIGHT: 66 IN | BODY MASS INDEX: 24.35 KG/M2

## 2023-08-07 DIAGNOSIS — Z23 NEED FOR VACCINATION: ICD-10-CM

## 2023-08-07 DIAGNOSIS — I10 ESSENTIAL HYPERTENSION, BENIGN: ICD-10-CM

## 2023-08-07 DIAGNOSIS — G62.9 PERIPHERAL POLYNEUROPATHY: ICD-10-CM

## 2023-08-07 DIAGNOSIS — R51.9 NONINTRACTABLE HEADACHE, UNSPECIFIED CHRONICITY PATTERN, UNSPECIFIED HEADACHE TYPE: ICD-10-CM

## 2023-08-07 DIAGNOSIS — E11.9 TYPE 2 DIABETES MELLITUS WITHOUT COMPLICATION, WITHOUT LONG-TERM CURRENT USE OF INSULIN (H): ICD-10-CM

## 2023-08-07 DIAGNOSIS — N18.31 TYPE 2 DIABETES MELLITUS WITH STAGE 3A CHRONIC KIDNEY DISEASE, WITHOUT LONG-TERM CURRENT USE OF INSULIN (H): ICD-10-CM

## 2023-08-07 DIAGNOSIS — Z23 NEED FOR VACCINE FOR TD (TETANUS-DIPHTHERIA): ICD-10-CM

## 2023-08-07 DIAGNOSIS — E55.9 VITAMIN D DEFICIENCY: ICD-10-CM

## 2023-08-07 DIAGNOSIS — E78.5 HYPERLIPIDEMIA LDL GOAL <100: ICD-10-CM

## 2023-08-07 DIAGNOSIS — N18.30 STAGE 3 CHRONIC KIDNEY DISEASE, UNSPECIFIED WHETHER STAGE 3A OR 3B CKD (H): ICD-10-CM

## 2023-08-07 DIAGNOSIS — E11.22 TYPE 2 DIABETES MELLITUS WITH STAGE 3A CHRONIC KIDNEY DISEASE, WITHOUT LONG-TERM CURRENT USE OF INSULIN (H): ICD-10-CM

## 2023-08-07 DIAGNOSIS — Z00.00 MEDICARE ANNUAL WELLNESS VISIT, SUBSEQUENT: Primary | ICD-10-CM

## 2023-08-07 DIAGNOSIS — M25.532 LEFT WRIST PAIN: ICD-10-CM

## 2023-08-07 DIAGNOSIS — Z23 HIGH PRIORITY FOR 2019-NCOV VACCINE: ICD-10-CM

## 2023-08-07 DIAGNOSIS — M10.9 ACUTE GOUT, UNSPECIFIED CAUSE, UNSPECIFIED SITE: ICD-10-CM

## 2023-08-07 LAB
ALBUMIN SERPL BCG-MCNC: 4.6 G/DL (ref 3.5–5.2)
ALP SERPL-CCNC: 119 U/L (ref 35–104)
ALT SERPL W P-5'-P-CCNC: 14 U/L (ref 0–50)
ANION GAP SERPL CALCULATED.3IONS-SCNC: 11 MMOL/L (ref 7–15)
AST SERPL W P-5'-P-CCNC: 19 U/L (ref 0–45)
BILIRUB SERPL-MCNC: 0.2 MG/DL
BUN SERPL-MCNC: 17.7 MG/DL (ref 8–23)
CALCIUM SERPL-MCNC: 9.8 MG/DL (ref 8.8–10.2)
CHLORIDE SERPL-SCNC: 105 MMOL/L (ref 98–107)
CHOLEST SERPL-MCNC: 221 MG/DL
CREAT SERPL-MCNC: 1.06 MG/DL (ref 0.51–0.95)
CREAT UR-MCNC: 52.9 MG/DL
DEPRECATED CALCIDIOL+CALCIFEROL SERPL-MC: 44 UG/L (ref 20–75)
DEPRECATED HCO3 PLAS-SCNC: 24 MMOL/L (ref 22–29)
ERYTHROCYTE [DISTWIDTH] IN BLOOD BY AUTOMATED COUNT: 13.8 % (ref 10–15)
GFR SERPL CREATININE-BSD FRML MDRD: 53 ML/MIN/1.73M2
GLUCOSE SERPL-MCNC: 104 MG/DL (ref 70–99)
HBA1C MFR BLD: 6.3 % (ref 0–5.6)
HCT VFR BLD AUTO: 42.5 % (ref 35–47)
HDLC SERPL-MCNC: 62 MG/DL
HGB BLD-MCNC: 13.7 G/DL (ref 11.7–15.7)
LDLC SERPL CALC-MCNC: 136 MG/DL
MCH RBC QN AUTO: 29.8 PG (ref 26.5–33)
MCHC RBC AUTO-ENTMCNC: 32.2 G/DL (ref 31.5–36.5)
MCV RBC AUTO: 92 FL (ref 78–100)
MICROALBUMIN UR-MCNC: <12 MG/L
MICROALBUMIN/CREAT UR: NORMAL MG/G{CREAT}
NONHDLC SERPL-MCNC: 159 MG/DL
PLATELET # BLD AUTO: 313 10E3/UL (ref 150–450)
POTASSIUM SERPL-SCNC: 4 MMOL/L (ref 3.4–5.3)
PROT SERPL-MCNC: 7.8 G/DL (ref 6.4–8.3)
RBC # BLD AUTO: 4.6 10E6/UL (ref 3.8–5.2)
SODIUM SERPL-SCNC: 140 MMOL/L (ref 136–145)
TRIGL SERPL-MCNC: 114 MG/DL
WBC # BLD AUTO: 4.6 10E3/UL (ref 4–11)

## 2023-08-07 PROCEDURE — 90471 IMMUNIZATION ADMIN: CPT | Performed by: INTERNAL MEDICINE

## 2023-08-07 PROCEDURE — 82570 ASSAY OF URINE CREATININE: CPT | Performed by: INTERNAL MEDICINE

## 2023-08-07 PROCEDURE — 83036 HEMOGLOBIN GLYCOSYLATED A1C: CPT | Performed by: INTERNAL MEDICINE

## 2023-08-07 PROCEDURE — 80053 COMPREHEN METABOLIC PANEL: CPT | Performed by: INTERNAL MEDICINE

## 2023-08-07 PROCEDURE — 0124A COVID-19 BIVALENT 12+ (PFIZER): CPT | Performed by: INTERNAL MEDICINE

## 2023-08-07 PROCEDURE — 90714 TD VACC NO PRESV 7 YRS+ IM: CPT | Performed by: INTERNAL MEDICINE

## 2023-08-07 PROCEDURE — 99214 OFFICE O/P EST MOD 30 MIN: CPT | Mod: 25 | Performed by: INTERNAL MEDICINE

## 2023-08-07 PROCEDURE — 36415 COLL VENOUS BLD VENIPUNCTURE: CPT | Performed by: INTERNAL MEDICINE

## 2023-08-07 PROCEDURE — 80061 LIPID PANEL: CPT | Performed by: INTERNAL MEDICINE

## 2023-08-07 PROCEDURE — 82043 UR ALBUMIN QUANTITATIVE: CPT | Performed by: INTERNAL MEDICINE

## 2023-08-07 PROCEDURE — 82306 VITAMIN D 25 HYDROXY: CPT | Performed by: INTERNAL MEDICINE

## 2023-08-07 PROCEDURE — 99397 PER PM REEVAL EST PAT 65+ YR: CPT | Mod: 25 | Performed by: INTERNAL MEDICINE

## 2023-08-07 PROCEDURE — 85027 COMPLETE CBC AUTOMATED: CPT | Performed by: INTERNAL MEDICINE

## 2023-08-07 PROCEDURE — 91312 COVID-19 BIVALENT 12+ (PFIZER): CPT | Performed by: INTERNAL MEDICINE

## 2023-08-07 RX ORDER — ROSUVASTATIN CALCIUM 5 MG/1
5 TABLET, COATED ORAL DAILY
Qty: 90 TABLET | Refills: 3 | Status: CANCELLED | OUTPATIENT
Start: 2023-08-07

## 2023-08-07 RX ORDER — AMLODIPINE BESYLATE 10 MG/1
10 TABLET ORAL DAILY
Qty: 90 TABLET | Refills: 3 | Status: SHIPPED | OUTPATIENT
Start: 2023-08-07 | End: 2024-08-09

## 2023-08-07 RX ORDER — IRBESARTAN 300 MG/1
300 TABLET ORAL AT BEDTIME
Qty: 90 TABLET | Refills: 3 | Status: SHIPPED | OUTPATIENT
Start: 2023-08-07 | End: 2024-08-07

## 2023-08-07 RX ORDER — METFORMIN HCL 500 MG
1000 TABLET, EXTENDED RELEASE 24 HR ORAL
Qty: 180 TABLET | Refills: 3 | Status: SHIPPED | OUTPATIENT
Start: 2023-08-07 | End: 2024-07-15

## 2023-08-07 RX ORDER — BLOOD SUGAR DIAGNOSTIC
STRIP MISCELLANEOUS
Qty: 100 STRIP | Refills: 3 | Status: SHIPPED | OUTPATIENT
Start: 2023-08-07 | End: 2024-07-25

## 2023-08-07 ASSESSMENT — PAIN SCALES - GENERAL: PAINLEVEL: NO PAIN (0)

## 2023-08-07 NOTE — PROGRESS NOTES
SUBJECTIVE:   Ashley is a 82 year old who presents for Preventive Visit.    She checks her sugars once a day and they tend to run very good.  Her blood pressure is also been good at home.  It is higher here.  She is up-to-date with her eye exam and has no sores on her feet or toes.    She has had a left wrist pain on the anterior side of her left wrist.  It comes and goes.  There may be some swelling.    She also occasionally can have the very short-lived head pain.  It comes and goes in different areas.  No fevers or night sweats or history of vasculitis.    She is otherwise doing well.  She is not exercising regularly.               Past Medical History:      Past Medical History:   Diagnosis Date    Anxiety     rare ativan use    Atypical chest pain 2013    neg est echo    Chest tightness 03/2019    neg est echo    CKD (chronic kidney disease) stage 3, GFR 30-59 ml/min (H)     Dizziness 2014    neg est echo    Glaucoma     Gout attack 06/29/2015, 9/19    elevated uric acid    HTN (hypertension)     stopped hctz 2015 due to gout, side effects with aldactone    Hx of colonoscopy 2008, 2018    tics    Hypercholesteremia     added rx 12/12, aches with zocor, then added lipitor 2015, aches with lipitor 2020, changed to crestor 12/20    OA (osteoarthritis)     Peripheral neuropathy     Stroke (H) 2004    intimal dissection    Type II or unspecified type diabetes mellitus without mention of complication, not stated as uncontrolled     diet controlled; added metformin 9/16/19    Vertigo 2004    hosp fsd, mri pos    Vitamin D deficiency 2013             Past Surgical History:      Past Surgical History:   Procedure Laterality Date    COLONOSCOPY N/A 8/23/2018    Procedure: COLONOSCOPY;  colonoscopy;  Surgeon: Alexei Lea MD;  Location:  GI    ECTOPIC PREGNANCY SURGERY  80's    HYSTERECTOMY, PAP NO LONGER INDICATED  1980    fibriods and endometriosis, maggy and bso             Social History:     Social History      Socioeconomic History    Marital status:      Spouse name: Not on file    Number of children: 2    Years of education: Not on file    Highest education level: Not on file   Occupational History    Occupation: retired, worked for [employer]   Tobacco Use    Smoking status: Never    Smokeless tobacco: Never   Substance and Sexual Activity    Alcohol use: No     Alcohol/week: 0.0 standard drinks of alcohol    Drug use: No    Sexual activity: Not Currently   Other Topics Concern    Parent/sibling w/ CABG, MI or angioplasty before 65F 55M? Not Asked   Social History Narrative    Not on file     Social Determinants of Health     Financial Resource Strain: Not on file   Food Insecurity: Not on file   Transportation Needs: Not on file   Physical Activity: Not on file   Stress: Not on file   Social Connections: Not on file   Intimate Partner Violence: Not on file   Housing Stability: Not on file             Family History:   reviewed         Allergies:   No Known Allergies          Medications:     Current Outpatient Medications   Medication Sig Dispense Refill    ACCU-CHEK FASTCLIX LANCETS MISC 1 each daily. 1 Box 3    amLODIPine (NORVASC) 10 MG tablet Take 1 tablet (10 mg) by mouth daily 90 tablet 3    aspirin 325 MG EC tablet Take 1 tablet by mouth daily. 100 tablet 3    blood glucose (ACCU-CHEK DEIDRA PLUS) test strip Use to test blood sugar 1 times daily or as directed. 100 strip 3    brimonidine (ALPHAGAN P) 0.1 % ophthalmic solution Place 1 drop into both eyes 2 times daily.      Cholecalciferol (VITAMIN D) 2000 UNITS CAPS Take 2,000 Units by mouth.      irbesartan (AVAPRO) 300 MG tablet Take 1 tablet (300 mg) by mouth At Bedtime 90 tablet 3    metFORMIN (GLUCOPHAGE XR) 500 MG 24 hr tablet Take 2 tablets (1,000 mg) by mouth daily (with dinner) 180 tablet 3    MULTIPLE VITAMIN PO Take  by mouth. 2 childrens multi vitamins daily.       UNABLE TO FIND MEDICATION NAME: VYZULTA      travoprost Z (benzalkonium)  "(TRAVATAN Z) 0.004 % ophthalmic solution 1 drop every evening.                 Review of Systems:     The 10 point Review of Systems is negative other than noted in the HPI           Physical Exam:   Blood pressure (!) 150/74, pulse 67, temperature 98  F (36.7  C), temperature source Oral, resp. rate 16, height 1.666 m (5' 5.59\"), weight 68.7 kg (151 lb 8 oz), SpO2 99 %, not currently breastfeeding.    Exam:  Constitutional: healthy appearing, alert and in no distress  Heent: Normocephalic. Head without obvious masses or lesions. PERRLDC, EOMI. Mouth exam within normal limits: tongue, mucous membranes, posterior pharynx all normal, no lesions or abnormalities seen.  Tm's and canals within normal limits bilaterally. Neck supple, no nuchal rigidity or masses. No supraclavicular, or cervical adenopathy. Thyroid symmetric, no masses.  Cardiovascular: Regular rate and rhythm, no murmer, rub or gallops.  JVP not elevated, no edema.  Carotids within normal limits bilaterally, no bruits.  Respiratory: Normal respiratory effort.  Lungs clear, normal flow, no wheezing or crackles.  Breasts: Normal bilaterally.  No masses or lesions.  Nipples within normal limits.  No axillary lesions or nodes.  My M.A. Was present during this part of the examination.  Gastrointestinal: Normal active bowel sounds.   Soft, not tender, no masses, guarding or rebound.  No hepatosplenomegaly.   Musculoskeletal: extremities normal, no gross deformities noted.  Skin: no suspicious lesions or rashes   Neurologic: Mental status within normal limits.  Speech fluent.  No gross motor abnormalities and gait intact.  Psychiatric: mentation appears normal and affect normal.  Feet within normal limits bilat, no sores         Data:   Labs sent        Assessment:   Normal complete physical exam  Hypertension, controlled on her checks, she will monitor it  3.  Non-insulin-dependent diabetes with CKD, follow-up labs today.  CKD, follow-up labs today, on " ARB  Vitamin D deficiency, follow-up today  Gout, no evidence of issues  Peripheral neuropathy stable,  Hyperlipidemia, off Crestor due to aches.  Follow-up labs today and consider it every other day  Left wrist pain, nothing on exam.  She could follow-up with Ortho for this  Head pains, doubt vasculitis, tumor or aneurysm, follow for now  Healthcare maintenance         Plan:   Covid booster  Td shot   Letter with labs  Exercise, diet  Monitor blood pressure  Consider ortho  Call if head pains change  Consider every other day statin      Julio César Peres M.D.              Are you in the first 12 months of your Medicare coverage?  No    History of Present Illness       Diabetes:   She presents for follow up of diabetes.  She is checking home blood glucose one time daily.   She checks blood glucose before meals.  Blood glucose is never over 200 and never under 70. She is aware of hypoglycemia symptoms including shakiness.    She has no concerns regarding her diabetes at this time.   She is not experiencing numbness or burning in feet, excessive thirst, blurry vision, weight changes or redness, sores or blisters on feet.           Hyperlipidemia:  She presents for follow up of hyperlipidemia.   She is not taking medication to lower cholesterol. She is not having myalgia or other side effects to statin medications.    Hypertension: She presents for follow up of hypertension.  She does check blood pressure  regularly outside of the clinic. Outpatient blood pressures have not been over 140/90. She follows a low salt diet.     She eats 2-3 servings of fruits and vegetables daily.She consumes 0 sweetened beverage(s) daily.   She is taking medications regularly.        Have you ever done Advance Care Planning? (For example, a Health Directive, POLST, or a discussion with a medical provider or your loved ones about your wishes): Yes, advance care planning is on file.       Fall risk  Fallen 2 or more times in the past year?:  No  Any fall with injury in the past year?: No    Cognitive Screening   1) Repeat 3 items (Leader, Season, Table)    2) Clock draw: NORMAL  3) 3 item recall: Recalls 2 objects   Results: NORMAL clock, 1-2 items recalled: COGNITIVE IMPAIRMENT LESS LIKELY    Mini-CogTM Copyright JOSE Wilde. Licensed by the author for use in [facility]; reprinted with permission (imelda@[facility]). All rights reserved.        Reviewed and updated as needed this visit by clinical staff   Tobacco  Allergies  Meds   Med Hx            Reviewed and updated as needed this visit by Provider       Med Hx           Social History     Tobacco Use    Smoking status: Never    Smokeless tobacco: Never   Substance Use Topics    Alcohol use: No     Alcohol/week: 0.0 standard drinks of alcohol             8/7/2023     9:12 AM   Alcohol Use   Prescreen: >3 drinks/day or >7 drinks/week? Not Applicable          No data to display              Do you have a current opioid prescription? No  Do you use any other controlled substances or medications that are not prescribed by a provider? None              Current providers sharing in care for this patient include:   Patient Care Team:  Julio César Peres MD as PCP - General (Internal Medicine)  Julio César Peres MD as Assigned PCP    The following health maintenance items are reviewed in Epic and correct as of today:  Health Maintenance   Topic Date Due    DEXA  Never done    ANNUAL REVIEW OF HM ORDERS  Never done    COVID-19 Vaccine (6 - Pfizer series) 02/28/2023    DTAP/TDAP/TD IMMUNIZATION (2 - Td or Tdap) 03/26/2023    A1C  08/09/2023    LIPID  08/09/2023    MICROALBUMIN  08/09/2023    ZOSTER IMMUNIZATION (2 of 2) 08/12/2023    INFLUENZA VACCINE (1) 09/01/2023    EYE EXAM  11/07/2023    BMP  02/09/2024    DIABETIC FOOT EXAM  02/09/2024    HEMOGLOBIN  02/09/2024    MEDICARE ANNUAL WELLNESS VISIT  08/07/2024    FALL RISK ASSESSMENT  08/07/2024    ADVANCE CARE PLANNING  02/08/2027     "PHQ-2 (once per calendar year)  Completed    Pneumococcal Vaccine: 65+ Years  Completed    URINALYSIS  Completed    IPV IMMUNIZATION  Aged Out    MENINGITIS IMMUNIZATION  Aged Out             Pertinent mammograms are reviewed under the imaging tab.    Review of Systems      OBJECTIVE:   BP (!) 153/75 (BP Location: Left arm, Patient Position: Sitting, Cuff Size: Adult Regular)   Pulse 67   Temp 98  F (36.7  C) (Oral)   Resp 16   Ht 1.666 m (5' 5.59\")   Wt 68.7 kg (151 lb 8 oz)   SpO2 99%   BMI 24.76 kg/m   Estimated body mass index is 24.76 kg/m  as calculated from the following:    Height as of this encounter: 1.666 m (5' 5.59\").    Weight as of this encounter: 68.7 kg (151 lb 8 oz).  Physical Exam          ASSESSMENT / PLAN:             COUNSELING:  Reviewed preventive health counseling, as reflected in patient instructions       Regular exercise       Healthy diet/nutrition        She reports that she has never smoked. She has never used smokeless tobacco.      Appropriate preventive services were discussed with this patient, including applicable screening as appropriate for cardiovascular disease, diabetes, osteopenia/osteoporosis, and glaucoma.  As appropriate for age/gender, discussed screening for colorectal cancer, prostate cancer, breast cancer, and cervical cancer. Checklist reviewing preventive services available has been given to the patient.    Reviewed patients plan of care and provided an AVS. The Basic Care Plan (routine screening as documented in Health Maintenance) for Ashley meets the Care Plan requirement. This Care Plan has been established and reviewed with the Patient.          Julio César Peres MD  [facility]    Identified Health Risks:  I have reviewed Opioid Use Disorder and Substance Use Disorder risk factors and made any needed referrals.   "

## 2023-08-08 NOTE — RESULT ENCOUNTER NOTE
It was very nice to see you.  You should be able to view your test results.    Your CBC or blood count is normal with no signs of anemia or blood disorders.  Your chemistry panel showed well-controlled diabetes with a hemoglobin A1c of 6.3.  Your urine is clear as well.    Your cholesterol is too high at 221.  I would like you to resume the Crestor but try it every other day.  If the aches return just let me know.    Your blood salts, kidney test, liver test, and proteins are all very stable.    Overall the test look quite good.  Please resume the Crestor every other day.  If you have questions let me know.    Julio César Peres M.D.

## 2023-08-09 DIAGNOSIS — E78.5 HYPERLIPIDEMIA LDL GOAL <100: ICD-10-CM

## 2023-08-09 RX ORDER — ROSUVASTATIN CALCIUM 5 MG/1
5 TABLET, COATED ORAL DAILY
Qty: 90 TABLET | Refills: 3 | Status: SHIPPED | OUTPATIENT
Start: 2023-08-09 | End: 2024-08-09

## 2023-08-24 ENCOUNTER — ANCILLARY PROCEDURE (OUTPATIENT)
Dept: GENERAL RADIOLOGY | Facility: CLINIC | Age: 83
End: 2023-08-24
Attending: FAMILY MEDICINE
Payer: COMMERCIAL

## 2023-08-24 ENCOUNTER — OFFICE VISIT (OUTPATIENT)
Dept: ORTHOPEDICS | Facility: CLINIC | Age: 83
End: 2023-08-24
Attending: PHYSICIAN ASSISTANT
Payer: COMMERCIAL

## 2023-08-24 VITALS
HEIGHT: 66 IN | SYSTOLIC BLOOD PRESSURE: 134 MMHG | WEIGHT: 153.4 LBS | DIASTOLIC BLOOD PRESSURE: 84 MMHG | BODY MASS INDEX: 24.65 KG/M2

## 2023-08-24 DIAGNOSIS — M25.532 LEFT WRIST PAIN: ICD-10-CM

## 2023-08-24 DIAGNOSIS — G56.02 CARPAL TUNNEL SYNDROME ON LEFT: Primary | ICD-10-CM

## 2023-08-24 PROCEDURE — 73110 X-RAY EXAM OF WRIST: CPT | Mod: TC | Performed by: RADIOLOGY

## 2023-08-24 PROCEDURE — 99204 OFFICE O/P NEW MOD 45 MIN: CPT | Performed by: FAMILY MEDICINE

## 2023-08-24 RX ORDER — PREDNISONE 20 MG/1
20 TABLET ORAL DAILY
Qty: 5 TABLET | Refills: 0 | Status: SHIPPED | OUTPATIENT
Start: 2023-08-24 | End: 2024-04-12

## 2023-08-24 NOTE — PATIENT INSTRUCTIONS
Thank you for choosing [facility]    [facility] CLIN[facility] LOCATIONS  [place] [address], [address] [place]   [place], [place], [address] [place] [address]   [phone]       APPOINTMENTS: [phone]    CARE QUESTIONS: [phone],    BILLING QUESTIONS: [phone]    FAX NUMBER: [phone]        Follow up: as needed      1. Carpal tunnel syndrome on left    2. Left wrist pain          Carpal Tunnel Exercises    You may do all of these exercises right away.    Wrist range of motion    Flexion: Gently bend your wrist forward. Hold for 5 seconds. Do 2 sets of 15.    Extension: Gently bend your wrist backward. Hold this position 5 seconds. Do 2 sets of 15.    Side to side: Gently move your wrist from side to side (a handshake motion). Hold for 5 seconds in each direction. Do 2 sets of 15.    Wrist stretch: Press the back of the hand on your injured side with your other hand to help bend your wrist. Hold for 15 to 30 seconds. Next, stretch the hand back by pressing the fingers in a backward direction. Hold for 15 to 30 seconds. Keep the arm on your injured side straight during this exercise. Do 3 sets.    Mid-trap exercise: Lie on your stomach on a firm surface and place a folded pillow underneath your chest. Place your arms out straight to your sides with your elbows straight and thumbs toward the ceiling. Slowly raise your arms toward the ceiling as you squeeze your shoulder blades together. Lower slowly. Do 3 sets of 15. As the exercise gets easier to do, hold soup cans or small weights in your hands.    Pectoralis stretch:  an open doorway or corner with both hands slightly above your head on the door frame or wall. Slowly lean forward until you feel a stretch in the front of your shoulders. Hold 15 to 30 seconds. Repeat 3 times.    Scalene stretch: Sit or stand and clasp both hands behind your back. Lower your left  shoulder and tilt your head toward the right until you feel a stretch. Hold this position for 15 to 30 seconds and then come back to the starting position. Then lower your right shoulder and tilt your head toward the left. Hold for 15 to 30 seconds. Repeat 3 times on each side.    Thoracic extension: Sit in a chair and clasp both arms behind your head. Gently arch backward and look up toward the ceiling. Repeat 10 times. Do this several times each day.    Scapular squeeze: While sitting or standing with your arms by your sides, squeeze your shoulder blades together and hold for 5 seconds. Do 2 sets of 15.    Wrist extension: Hold a soup can or hammer handle in your hand with your palm facing down. Slowly bend your wrist up. Slowly lower the weight down into the starting position. Do 2 sets of 15. Gradually increase the weight of the object you are holding.     strengthening: Squeeze a soft rubber ball and hold the squeeze for 5 seconds. Do 2 sets of 15.      Developed by [employer].  Published by [employer].  Copyright  2014 [employer] and/or one of its subsidiaries. All rights reserved.

## 2023-08-24 NOTE — PROGRESS NOTES
CHIEF COMPLAINT:  No chief complaint on file.       HISTORY OF PRESENT ILLNESS  Ms. Liu is a pleasant 82 year old right hand dominant female who presents to clinic today with left wrist pain.  Ashley explains that left wrist pain began without any acute inciting event or trauma.  It seems to be worse when holding objects and using her hands.  It can be rather intermittent and sometimes does not cause any trouble at all.  No numbness or tingling.  Does seem to travel at the volar wrist into the base of the hand and back into the mid forearm.  Is not able to find an area of tenderness when she palpates around.  She notes she has tried a wrist strap.    Onset: gradual  Location: left wrist  Quality:  aching  Duration: 1+ months   Severity: 6/10 at worst  Timing:intermittent episodes that worsens with holding/ lifting objects  Modifying factors:  resting and non-use makes it better, movement and use makes it worse  Associated signs & symptoms: pain and swelling  Previous similar pain: No  Treatments to date: aspercreme spray, bracing with wrist strap.        She notes swelling was at the volar aspect of her wrist.  She felt there was puffy in this region.  Does not currently swollen.     Additional history: as documented    Review of Systems:  Have you recently had a a fever, chills, weight loss? No  Do you have any vision problems? Yes, glaucoma and cataracts.   Do you have any chest pain or edema? No  Do you have any shortness of breath or wheezing?  No  Do you have stomach problems? No  Do you have any numbness or focal weakness? No  Do you have diabetes? Yes, prediabetic   Do you have problems with bleeding or clotting? No  Do you have an rashes or other skin lesions? No    MEDICAL HISTORY  Patient Active Problem List   Diagnosis    Essential hypertension, benign    OA (osteoarthritis)    Advanced directives, counseling/discussion    Hyperlipidemia LDL goal <100    Vitamin D deficiency    Anxiety    CKD (chronic  "kidney disease) stage 3, GFR 30-59 ml/min (H)    Peripheral polyneuropathy    Acute gout, unspecified cause, unspecified site    Type 2 diabetes mellitus with stage 3 chronic kidney disease, without long-term current use of insulin (H)       Current Outpatient Medications   Medication Sig Dispense Refill    ACCU-CHEK FASTCLIX LANCETS MISC 1 each daily. 1 Box 3    amLODIPine (NORVASC) 10 MG tablet Take 1 tablet (10 mg) by mouth daily 90 tablet 3    aspirin 325 MG EC tablet Take 1 tablet by mouth daily. 100 tablet 3    blood glucose (ACCU-CHEK DEIDRA PLUS) test strip Use to test blood sugar 1 times daily or as directed. 100 strip 3    brimonidine (ALPHAGAN P) 0.1 % ophthalmic solution Place 1 drop into both eyes 2 times daily.      Cholecalciferol (VITAMIN D) 2000 UNITS CAPS Take 2,000 Units by mouth.      irbesartan (AVAPRO) 300 MG tablet Take 1 tablet (300 mg) by mouth At Bedtime 90 tablet 3    metFORMIN (GLUCOPHAGE XR) 500 MG 24 hr tablet Take 2 tablets (1,000 mg) by mouth daily (with dinner) 180 tablet 3    MULTIPLE VITAMIN PO Take  by mouth. 2 childrens multi vitamins daily.       rosuvastatin (CRESTOR) 5 MG tablet TAKE 1 TABLET BY MOUTH EVERY DAY 90 tablet 3    UNABLE TO FIND MEDICATION NAME: VYZULTA      travoprost Z (benzalkonium) (TRAVATAN Z) 0.004 % ophthalmic solution 1 drop every evening.         No Known Allergies    Family History   Problem Relation Age of Onset    Hypertension Mother        Additional medical/Social/Surgical histories reviewed in [facility] and updated as appropriate.    PHYSICAL EXAM  Ht 1.676 m (5' 6\")   Wt 69.6 kg (153 lb 6.4 oz)   BMI 24.76 kg/m      General- AOX4, NAD  Musculoskeletal -  left wrist  Inspection: normal joint alignment, no obvious deformity, no swelling  Palpation: No bony or soft tissue tenderness over dorsal wrist compartments.  No tenderness at radiocarpal or ulnocarpal joint. No tenderness at ECU or ECR.  No tenderness at the anatomical snuffbox, radial or ulnar " styloid.   ROM:  Approximately 60  of wrist flexion, 60  wrist extension, 30 ulnar deviation and 15  radial deviation without pain.  Normal supination and pronation.   Strength: 5/5  strength, 5/5 flexion, extension, pronation, supination, adduction, abduction  Special tests:  (-) Tinel's  (-) Finkelstein  (+) Phalen pain into distal wrist and proximal hand.  (-) Juarez click test  (-) ulnar impaction    Neuro  - no sensory or motor deficit, grossly normal coordination, normal muscle tone  Skin  - no ecchymosis, erythema, warmth, or induration, no obvious rash    IMAGING : X-ray left wrist 3 views. Final results and radiologist's interpretation, available in the [facility] health record. Images were reviewed with the patient/family members in the office today. My personal interpretation of the performed imaging is triscaphe osteoarthritis, CMC osteoarthritis.  No radiocarpal or ulnocarpal degenerative changes.     ASSESSMENT & PLAN  Ms. Liu is a 82 year old year old female who presents to clinic today with acute left wrist pain exacerbated by activities and can be at random over the last 4 to 6 weeks.    Exam is rather benign except for positive Phalen test today which does seem to recreate pain in the distribution of the median nerve.  Concern for possible tendinitis, possible carpal tunnel syndrome as relates to swelling/tendinitis of the region.  I will treat as such.    Diagnosis:   Acute pain of left wrist  Carpal tunnel syndrome of left wrist    -Night splinting of left wrist, brace fitted today  -Carpal tunnel exercises provided  -Low-dose prednisone course due to history of DM 2 on metformin, well-controlled A1c 6.3  -Consideration for hand therapy if no improvement is noted in the next 4 to 6 weeks.    It was a pleasure seeing Ashley today.    Raji Quiñonez DO, CAM  Primary Care Sports Medicine

## 2023-08-24 NOTE — LETTER
8/24/2023         RE: Ashley Liu  [address]  [place] [address]        Dear Colleague,    Thank you for referring your patient, Ashley Liu, to the [facility] [place]. Please see a copy of my visit note below.    CHIEF COMPLAINT:  No chief complaint on file.       HISTORY OF PRESENT ILLNESS  Ms. Liu is a pleasant 82 year old right hand dominant female who presents to clinic today with left wrist pain.  Ashley explains that left wrist pain began without any acute inciting event or trauma.  It seems to be worse when holding objects and using her hands.  It can be rather intermittent and sometimes does not cause any trouble at all.  No numbness or tingling.  Does seem to travel at the volar wrist into the base of the hand and back into the mid forearm.  Is not able to find an area of tenderness when she palpates around.  She notes she has tried a wrist strap.    Onset: gradual  Location: left wrist  Quality:  aching  Duration: 1+ months   Severity: 6/10 at worst  Timing:intermittent episodes that worsens with holding/ lifting objects  Modifying factors:  resting and non-use makes it better, movement and use makes it worse  Associated signs & symptoms: pain and swelling  Previous similar pain: No  Treatments to date: aspercreme spray, bracing with wrist strap.        She notes swelling was at the volar aspect of her wrist.  She felt there was puffy in this region.  Does not currently swollen.     Additional history: as documented    Review of Systems:  Have you recently had a a fever, chills, weight loss? No  Do you have any vision problems? Yes, glaucoma and cataracts.   Do you have any chest pain or edema? No  Do you have any shortness of breath or wheezing?  No  Do you have stomach problems? No  Do you have any numbness or focal weakness? No  Do you have diabetes? Yes, prediabetic   Do you have problems with bleeding or clotting? No  Do you have an rashes or  "other skin lesions? No    MEDICAL HISTORY  Patient Active Problem List   Diagnosis     Essential hypertension, benign     OA (osteoarthritis)     Advanced directives, counseling/discussion     Hyperlipidemia LDL goal <100     Vitamin D deficiency     Anxiety     CKD (chronic kidney disease) stage 3, GFR 30-59 ml/min (H)     Peripheral polyneuropathy     Acute gout, unspecified cause, unspecified site     Type 2 diabetes mellitus with stage 3 chronic kidney disease, without long-term current use of insulin (H)       Current Outpatient Medications   Medication Sig Dispense Refill     ACCU-CHEK FASTCLIX LANCETS MISC 1 each daily. 1 Box 3     amLODIPine (NORVASC) 10 MG tablet Take 1 tablet (10 mg) by mouth daily 90 tablet 3     aspirin 325 MG EC tablet Take 1 tablet by mouth daily. 100 tablet 3     blood glucose (ACCU-CHEK DEIDRA PLUS) test strip Use to test blood sugar 1 times daily or as directed. 100 strip 3     brimonidine (ALPHAGAN P) 0.1 % ophthalmic solution Place 1 drop into both eyes 2 times daily.       Cholecalciferol (VITAMIN D) 2000 UNITS CAPS Take 2,000 Units by mouth.       irbesartan (AVAPRO) 300 MG tablet Take 1 tablet (300 mg) by mouth At Bedtime 90 tablet 3     metFORMIN (GLUCOPHAGE XR) 500 MG 24 hr tablet Take 2 tablets (1,000 mg) by mouth daily (with dinner) 180 tablet 3     MULTIPLE VITAMIN PO Take  by mouth. 2 childrens multi vitamins daily.        rosuvastatin (CRESTOR) 5 MG tablet TAKE 1 TABLET BY MOUTH EVERY DAY 90 tablet 3     UNABLE TO FIND MEDICATION NAME: VYZULTA       travoprost Z (benzalkonium) (TRAVATAN Z) 0.004 % ophthalmic solution 1 drop every evening.         No Known Allergies    Family History   Problem Relation Age of Onset     Hypertension Mother        Additional medical/Social/Surgical histories reviewed in [facility] and updated as appropriate.    PHYSICAL EXAM  Ht 1.676 m (5' 6\")   Wt 69.6 kg (153 lb 6.4 oz)   BMI 24.76 kg/m      General- AOX4, NAD  Musculoskeletal -  left " wrist  Inspection: normal joint alignment, no obvious deformity, no swelling  Palpation: No bony or soft tissue tenderness over dorsal wrist compartments.  No tenderness at radiocarpal or ulnocarpal joint. No tenderness at ECU or ECR.  No tenderness at the anatomical snuffbox, radial or ulnar styloid.   ROM:  Approximately 60  of wrist flexion, 60  wrist extension, 30 ulnar deviation and 15  radial deviation without pain.  Normal supination and pronation.   Strength: 5/5  strength, 5/5 flexion, extension, pronation, supination, adduction, abduction  Special tests:  (-) Tinel's  (-) Finkelstein  (+) Phalen pain into distal wrist and proximal hand.  (-) Juarez click test  (-) ulnar impaction    Neuro  - no sensory or motor deficit, grossly normal coordination, normal muscle tone  Skin  - no ecchymosis, erythema, warmth, or induration, no obvious rash    IMAGING : X-ray left wrist 3 views. Final results and radiologist's interpretation, available in the [facility] health record. Images were reviewed with the patient/family members in the office today. My personal interpretation of the performed imaging is triscaphe osteoarthritis, CMC osteoarthritis.  No radiocarpal or ulnocarpal degenerative changes.     ASSESSMENT & PLAN  Ms. Liu is a 82 year old year old female who presents to clinic today with acute left wrist pain exacerbated by activities and can be at random over the last 4 to 6 weeks.    Exam is rather benign except for positive Phalen test today which does seem to recreate pain in the distribution of the median nerve.  Concern for possible tendinitis, possible carpal tunnel syndrome as relates to swelling/tendinitis of the region.  I will treat as such.    Diagnosis:   Acute pain of left wrist  Carpal tunnel syndrome of left wrist    -Night splinting of left wrist, brace fitted today  -Carpal tunnel exercises provided  -Low-dose prednisone course due to history of DM 2 on metformin, well-controlled A1c  6.3  -Consideration for hand therapy if no improvement is noted in the next 4 to 6 weeks.    It was a pleasure seeing Ashley today.    Raji Quiñonez DO, [facility]  Primary Care Sports Medicine       Again, thank you for allowing me to participate in the care of your patient.        Sincerely,        Raji Quiñonez DO

## 2023-12-21 ENCOUNTER — TRANSFERRED RECORDS (OUTPATIENT)
Dept: HEALTH INFORMATION MANAGEMENT | Facility: CLINIC | Age: 83
End: 2023-12-21
Payer: COMMERCIAL

## 2023-12-21 LAB — RETINOPATHY: NEGATIVE

## 2024-01-11 ENCOUNTER — TRANSFERRED RECORDS (OUTPATIENT)
Dept: HEALTH INFORMATION MANAGEMENT | Facility: CLINIC | Age: 84
End: 2024-01-11
Payer: COMMERCIAL

## 2024-03-24 ENCOUNTER — HEALTH MAINTENANCE LETTER (OUTPATIENT)
Age: 84
End: 2024-03-24

## 2024-04-12 ENCOUNTER — OFFICE VISIT (OUTPATIENT)
Dept: FAMILY MEDICINE | Facility: CLINIC | Age: 84
End: 2024-04-12
Payer: COMMERCIAL

## 2024-04-12 VITALS
DIASTOLIC BLOOD PRESSURE: 68 MMHG | TEMPERATURE: 97.7 F | HEIGHT: 66 IN | HEART RATE: 89 BPM | OXYGEN SATURATION: 100 % | RESPIRATION RATE: 16 BRPM | BODY MASS INDEX: 24.43 KG/M2 | SYSTOLIC BLOOD PRESSURE: 136 MMHG | WEIGHT: 152 LBS

## 2024-04-12 DIAGNOSIS — I10 ESSENTIAL HYPERTENSION, BENIGN: ICD-10-CM

## 2024-04-12 DIAGNOSIS — E78.5 HYPERLIPIDEMIA LDL GOAL <100: ICD-10-CM

## 2024-04-12 DIAGNOSIS — N18.31 TYPE 2 DIABETES MELLITUS WITH STAGE 3A CHRONIC KIDNEY DISEASE, WITHOUT LONG-TERM CURRENT USE OF INSULIN (H): ICD-10-CM

## 2024-04-12 DIAGNOSIS — E11.22 TYPE 2 DIABETES MELLITUS WITH STAGE 3A CHRONIC KIDNEY DISEASE, WITHOUT LONG-TERM CURRENT USE OF INSULIN (H): ICD-10-CM

## 2024-04-12 DIAGNOSIS — H26.9 CATARACT, UNSPECIFIED CATARACT TYPE, UNSPECIFIED LATERALITY: ICD-10-CM

## 2024-04-12 DIAGNOSIS — Z01.818 PRE-OP EXAMINATION: Primary | ICD-10-CM

## 2024-04-12 LAB — HBA1C MFR BLD: 6.5 % (ref 0–5.6)

## 2024-04-12 PROCEDURE — 99214 OFFICE O/P EST MOD 30 MIN: CPT | Performed by: INTERNAL MEDICINE

## 2024-04-12 PROCEDURE — 83036 HEMOGLOBIN GLYCOSYLATED A1C: CPT | Performed by: INTERNAL MEDICINE

## 2024-04-12 PROCEDURE — 36415 COLL VENOUS BLD VENIPUNCTURE: CPT | Performed by: INTERNAL MEDICINE

## 2024-04-12 ASSESSMENT — PAIN SCALES - GENERAL: PAINLEVEL: NO PAIN (0)

## 2024-04-12 NOTE — PATIENT INSTRUCTIONS
Change your aspirin to one baby aspirin a day    On the day of surgery take your usual meds with a sip of water    Julio César Peres M.D.

## 2024-04-12 NOTE — RESULT ENCOUNTER NOTE
It was nice seeing you today.  Your hemoglobin A1c remains excellent at 6.5.    Julio César Peres M.D.

## 2024-04-12 NOTE — PROGRESS NOTES
reoperative Evaluation  [address], [address]  [facility] [address]  Phone: [phone]  Primary Provider: Julio César Peres  Pre-op Performing Provider: JULIO CÉSAR PERES  Apr 12, 2024       Ashley is a 83 year old, presenting for the following:  No chief complaint on file.      Surgical Information  Surgery/Procedure: Cataract surgery  Surgery Location: [facility] [place]  Surgeon: Dr. Ledesma  Surgery Date: 04/16/2024  Time of Surgery: 8:10am  Where patient plans to recover: At home with family  Fax number for surgical facility: [phone]        Subjective       HPI related to upcoming procedure: This is a very pleasant 83-year-old who I am seeing for preoperative evaluation prior to cataract surgery.  She feels quite well.  She has had no recent illness.  She has diabetes and her sugars sugars are checked once a day and they are very good in the morning.  She otherwise feels fine.    She has hypertension and her blood pressure is controlled.  She has hypercholesterolemia and is only able to take Crestor every other day.                Past Medical History:      Past Medical History:   Diagnosis Date    Anxiety     rare ativan use    Atypical chest pain 2013    neg est echo    Chest tightness 03/2019    neg est echo    CKD (chronic kidney disease) stage 3, GFR 30-59 ml/min (H)     Dizziness 2014    neg est echo    Glaucoma     Gout attack 06/29/2015, 9/19    elevated uric acid    HTN (hypertension)     stopped hctz 2015 due to gout, side effects with aldactone    Hx of colonoscopy 2008, 2018    tics    Hypercholesteremia     added rx 12/12, aches with zocor, then added lipitor 2015, aches with lipitor 2020, changed to crestor 12/20    OA (osteoarthritis)     Peripheral neuropathy     Stroke (H) 2004    intimal dissection    Type II or unspecified type diabetes mellitus without mention of complication, not stated as uncontrolled     diet controlled;  added metformin 9/16/19    Vertigo 2004    hosp fsd, mri pos    Vitamin D deficiency 2013             Past Surgical History:      Past Surgical History:   Procedure Laterality Date    COLONOSCOPY N/A 8/23/2018    Procedure: COLONOSCOPY;  colonoscopy;  Surgeon: Alexei Lea MD;  Location:  GI    ECTOPIC PREGNANCY SURGERY  80's    HYSTERECTOMY, PAP NO LONGER INDICATED  1980    fibriods and endometriosis, maggy and bso             Social History:     Social History     Tobacco Use    Smoking status: Never    Smokeless tobacco: Never   Substance Use Topics    Alcohol use: No     Alcohol/week: 0.0 standard drinks of alcohol             Family History:   No family history of bleeding difficulty, anesthesia problems or blood clots.         Allergies:   No Known Allergies          Medications:     Current Outpatient Medications   Medication Sig Dispense Refill    ACCU-CHEK FASTCLIX LANCETS MISC 1 each daily. 1 Box 3    amLODIPine (NORVASC) 10 MG tablet Take 1 tablet (10 mg) by mouth daily 90 tablet 3    aspirin 325 MG EC tablet Take 1 tablet by mouth daily. 100 tablet 3    blood glucose (ACCU-CHEK DEIDRA PLUS) test strip Use to test blood sugar 1 times daily or as directed. 100 strip 3    brimonidine (ALPHAGAN P) 0.1 % ophthalmic solution Place 1 drop into both eyes 2 times daily.      Cholecalciferol (VITAMIN D) 2000 UNITS CAPS Take 2,000 Units by mouth.      irbesartan (AVAPRO) 300 MG tablet Take 1 tablet (300 mg) by mouth At Bedtime 90 tablet 3    metFORMIN (GLUCOPHAGE XR) 500 MG 24 hr tablet Take 2 tablets (1,000 mg) by mouth daily (with dinner) 180 tablet 3    MULTIPLE VITAMIN PO Take  by mouth. 2 childrens multi vitamins daily.       rosuvastatin (CRESTOR) 5 MG tablet TAKE 1 TABLET BY MOUTH EVERY DAY 90 tablet 3    UNABLE TO FIND MEDICATION NAME: VYZULTA                 Review of Systems:     No history of bleeding difficulty, anesthesia problems or blood clots.  The 10 point Review of Systems is negative  "other than noted in the HPI           Physical Exam:   Blood pressure 136/68, pulse 89, temperature 97.7  F (36.5  C), temperature source Temporal, resp. rate 16, height 1.676 m (5' 6\"), weight 68.9 kg (152 lb), SpO2 100%, not currently breastfeeding.    Constitutional: healthy appearing, alert and in no distress  Heent: Normocephalic. Head without obvious masses or lesions. PERRLDC, EOMI. Mouth exam within normal limits: tongue, mucous membranes, posterior pharynx all normal, no lesions or abnormalities seen. Neck supple, no nuchal rigidity or masses. No supraclavicular, or cervical adenopathy. Thyroid symmetric, no masses.  Cardiovascular: Regular rate and rhythm, no murmer, rub or gallops.  JVP not elevated, no edema.  Carotids within normal limits bilaterally, no bruits.  Respiratory: Normal respiratory effort.  Lungs clear, normal flow, no wheezing or crackles.  Gastrointestinal: Normal active bowel sounds.   Soft, not tender, no masses, guarding or rebound.  No hepatosplenomegaly.   Musculoskeletal: extremities normal, no gross deformities noted.  Skin: no suspicious lesions or rashes   Neurologic: Mental status within normal limits.  Speech fluent.  No gross motor abnormalities and gait intact.  Psychiatric: mentation appears normal and affect normal.         Data:   Labs sent        Assessment:   Normal preoperative examination for cataract, the patient should be low risk for the procedure  Type 2 diabetes, CKD, has been stable, check labs.  Given her age I do not think that it is necessary to add SGLT2 inhibitor  Hypertension, controlled  Hyperlipidemia, tolerates Crestor but only every other day         Plan:   The patient is cleared for the surgery  She can change her aspirin 81 mg daily  Check A1c today  On the day of surgery she can take her meds with a sip of water      Julio César Peres M.D.  "

## 2024-07-09 DIAGNOSIS — E78.5 HYPERLIPIDEMIA LDL GOAL <100: ICD-10-CM

## 2024-07-09 RX ORDER — ROSUVASTATIN CALCIUM 5 MG/1
5 TABLET, COATED ORAL DAILY
Qty: 90 TABLET | Refills: 3 | OUTPATIENT
Start: 2024-07-09

## 2024-07-14 DIAGNOSIS — N18.31 TYPE 2 DIABETES MELLITUS WITH STAGE 3A CHRONIC KIDNEY DISEASE, WITHOUT LONG-TERM CURRENT USE OF INSULIN (H): ICD-10-CM

## 2024-07-14 DIAGNOSIS — E11.22 TYPE 2 DIABETES MELLITUS WITH STAGE 3A CHRONIC KIDNEY DISEASE, WITHOUT LONG-TERM CURRENT USE OF INSULIN (H): ICD-10-CM

## 2024-07-15 RX ORDER — METFORMIN HCL 500 MG
1000 TABLET, EXTENDED RELEASE 24 HR ORAL
Qty: 180 TABLET | Refills: 3 | Status: SHIPPED | OUTPATIENT
Start: 2024-07-15 | End: 2024-08-09

## 2024-07-25 DIAGNOSIS — E11.9 TYPE 2 DIABETES MELLITUS WITHOUT COMPLICATION, WITHOUT LONG-TERM CURRENT USE OF INSULIN (H): ICD-10-CM

## 2024-07-25 RX ORDER — BLOOD SUGAR DIAGNOSTIC
STRIP MISCELLANEOUS
Qty: 100 STRIP | Refills: 2 | Status: SHIPPED | OUTPATIENT
Start: 2024-07-25 | End: 2024-08-09

## 2024-07-25 NOTE — TELEPHONE ENCOUNTER
Prescription approved per [employer] Refill Protocol.  Norma Griffiths, RN  [facility] Triage Nurse

## 2024-08-05 SDOH — HEALTH STABILITY: PHYSICAL HEALTH: ON AVERAGE, HOW MANY MINUTES DO YOU ENGAGE IN EXERCISE AT THIS LEVEL?: 20 MIN

## 2024-08-05 SDOH — HEALTH STABILITY: PHYSICAL HEALTH: ON AVERAGE, HOW MANY DAYS PER WEEK DO YOU ENGAGE IN MODERATE TO STRENUOUS EXERCISE (LIKE A BRISK WALK)?: 3 DAYS

## 2024-08-05 ASSESSMENT — SOCIAL DETERMINANTS OF HEALTH (SDOH): HOW OFTEN DO YOU GET TOGETHER WITH FRIENDS OR RELATIVES?: PATIENT DECLINED

## 2024-08-07 DIAGNOSIS — I10 ESSENTIAL HYPERTENSION, BENIGN: ICD-10-CM

## 2024-08-07 RX ORDER — IRBESARTAN 300 MG/1
300 TABLET ORAL AT BEDTIME
Qty: 90 TABLET | Refills: 3 | Status: SHIPPED | OUTPATIENT
Start: 2024-08-07 | End: 2024-08-09

## 2024-08-09 ENCOUNTER — OFFICE VISIT (OUTPATIENT)
Dept: FAMILY MEDICINE | Facility: CLINIC | Age: 84
End: 2024-08-09
Payer: COMMERCIAL

## 2024-08-09 VITALS
WEIGHT: 154 LBS | TEMPERATURE: 97.7 F | DIASTOLIC BLOOD PRESSURE: 71 MMHG | HEIGHT: 65 IN | RESPIRATION RATE: 16 BRPM | SYSTOLIC BLOOD PRESSURE: 137 MMHG | BODY MASS INDEX: 25.66 KG/M2 | OXYGEN SATURATION: 100 % | HEART RATE: 66 BPM

## 2024-08-09 DIAGNOSIS — E55.9 VITAMIN D DEFICIENCY: ICD-10-CM

## 2024-08-09 DIAGNOSIS — E11.22 TYPE 2 DIABETES MELLITUS WITH STAGE 3A CHRONIC KIDNEY DISEASE, WITHOUT LONG-TERM CURRENT USE OF INSULIN (H): ICD-10-CM

## 2024-08-09 DIAGNOSIS — I10 ESSENTIAL HYPERTENSION, BENIGN: ICD-10-CM

## 2024-08-09 DIAGNOSIS — E78.5 HYPERLIPIDEMIA LDL GOAL <100: ICD-10-CM

## 2024-08-09 DIAGNOSIS — E11.9 TYPE 2 DIABETES MELLITUS WITHOUT COMPLICATION, WITHOUT LONG-TERM CURRENT USE OF INSULIN (H): ICD-10-CM

## 2024-08-09 DIAGNOSIS — E11.9 TYPE 2 DIABETES, HBA1C GOAL < 7% (H): ICD-10-CM

## 2024-08-09 DIAGNOSIS — G62.9 PERIPHERAL POLYNEUROPATHY: ICD-10-CM

## 2024-08-09 DIAGNOSIS — Z00.00 ENCOUNTER FOR MEDICARE ANNUAL WELLNESS EXAM: Primary | ICD-10-CM

## 2024-08-09 DIAGNOSIS — R52 ACHES: ICD-10-CM

## 2024-08-09 DIAGNOSIS — N18.31 STAGE 3A CHRONIC KIDNEY DISEASE (H): ICD-10-CM

## 2024-08-09 DIAGNOSIS — M10.9 ACUTE GOUT, UNSPECIFIED CAUSE, UNSPECIFIED SITE: ICD-10-CM

## 2024-08-09 DIAGNOSIS — N18.31 TYPE 2 DIABETES MELLITUS WITH STAGE 3A CHRONIC KIDNEY DISEASE, WITHOUT LONG-TERM CURRENT USE OF INSULIN (H): ICD-10-CM

## 2024-08-09 DIAGNOSIS — F41.9 ANXIETY: ICD-10-CM

## 2024-08-09 LAB
ALBUMIN SERPL BCG-MCNC: 4.4 G/DL (ref 3.5–5.2)
ALP SERPL-CCNC: 108 U/L (ref 40–150)
ALT SERPL W P-5'-P-CCNC: 14 U/L (ref 0–50)
ANION GAP SERPL CALCULATED.3IONS-SCNC: 13 MMOL/L (ref 7–15)
AST SERPL W P-5'-P-CCNC: 16 U/L (ref 0–45)
BILIRUB SERPL-MCNC: 0.3 MG/DL
BUN SERPL-MCNC: 20.8 MG/DL (ref 8–23)
CALCIUM SERPL-MCNC: 9.6 MG/DL (ref 8.8–10.4)
CHLORIDE SERPL-SCNC: 102 MMOL/L (ref 98–107)
CHOLEST SERPL-MCNC: 134 MG/DL
CK SERPL-CCNC: 58 U/L (ref 26–192)
CREAT SERPL-MCNC: 1.11 MG/DL (ref 0.51–0.95)
CREAT UR-MCNC: 194 MG/DL
EGFRCR SERPLBLD CKD-EPI 2021: 50 ML/MIN/1.73M2
ERYTHROCYTE [DISTWIDTH] IN BLOOD BY AUTOMATED COUNT: 13.2 % (ref 10–15)
FASTING STATUS PATIENT QL REPORTED: YES
FASTING STATUS PATIENT QL REPORTED: YES
GLUCOSE SERPL-MCNC: 138 MG/DL (ref 70–99)
HBA1C MFR BLD: 6.6 % (ref 0–5.6)
HCO3 SERPL-SCNC: 24 MMOL/L (ref 22–29)
HCT VFR BLD AUTO: 39.6 % (ref 35–47)
HDLC SERPL-MCNC: 53 MG/DL
HGB BLD-MCNC: 12.9 G/DL (ref 11.7–15.7)
LDLC SERPL CALC-MCNC: 66 MG/DL
MCH RBC QN AUTO: 30.4 PG (ref 26.5–33)
MCHC RBC AUTO-ENTMCNC: 32.6 G/DL (ref 31.5–36.5)
MCV RBC AUTO: 93 FL (ref 78–100)
MICROALBUMIN UR-MCNC: <12 MG/L
MICROALBUMIN/CREAT UR: NORMAL MG/G{CREAT}
NONHDLC SERPL-MCNC: 81 MG/DL
PLATELET # BLD AUTO: 305 10E3/UL (ref 150–450)
POTASSIUM SERPL-SCNC: 3.9 MMOL/L (ref 3.4–5.3)
PROT SERPL-MCNC: 7.4 G/DL (ref 6.4–8.3)
RBC # BLD AUTO: 4.25 10E6/UL (ref 3.8–5.2)
SODIUM SERPL-SCNC: 139 MMOL/L (ref 135–145)
TRIGL SERPL-MCNC: 77 MG/DL
TSH SERPL DL<=0.005 MIU/L-ACNC: 3.08 UIU/ML (ref 0.3–4.2)
WBC # BLD AUTO: 4.7 10E3/UL (ref 4–11)

## 2024-08-09 PROCEDURE — 82570 ASSAY OF URINE CREATININE: CPT | Performed by: INTERNAL MEDICINE

## 2024-08-09 PROCEDURE — 84443 ASSAY THYROID STIM HORMONE: CPT | Performed by: INTERNAL MEDICINE

## 2024-08-09 PROCEDURE — 80053 COMPREHEN METABOLIC PANEL: CPT | Performed by: INTERNAL MEDICINE

## 2024-08-09 PROCEDURE — 36415 COLL VENOUS BLD VENIPUNCTURE: CPT | Performed by: INTERNAL MEDICINE

## 2024-08-09 PROCEDURE — 85027 COMPLETE CBC AUTOMATED: CPT | Performed by: INTERNAL MEDICINE

## 2024-08-09 PROCEDURE — 80061 LIPID PANEL: CPT | Performed by: INTERNAL MEDICINE

## 2024-08-09 PROCEDURE — 82043 UR ALBUMIN QUANTITATIVE: CPT | Performed by: INTERNAL MEDICINE

## 2024-08-09 PROCEDURE — G0439 PPPS, SUBSEQ VISIT: HCPCS | Performed by: INTERNAL MEDICINE

## 2024-08-09 PROCEDURE — 99214 OFFICE O/P EST MOD 30 MIN: CPT | Mod: 25 | Performed by: INTERNAL MEDICINE

## 2024-08-09 PROCEDURE — 83036 HEMOGLOBIN GLYCOSYLATED A1C: CPT | Performed by: INTERNAL MEDICINE

## 2024-08-09 PROCEDURE — 82550 ASSAY OF CK (CPK): CPT | Performed by: INTERNAL MEDICINE

## 2024-08-09 RX ORDER — AMLODIPINE BESYLATE 10 MG/1
10 TABLET ORAL DAILY
Qty: 90 TABLET | Refills: 3 | Status: SHIPPED | OUTPATIENT
Start: 2024-08-09

## 2024-08-09 RX ORDER — LANCETS
1 EACH MISCELLANEOUS DAILY
Qty: 90 EACH | Refills: 4 | Status: SHIPPED | OUTPATIENT
Start: 2024-08-09

## 2024-08-09 RX ORDER — ROSUVASTATIN CALCIUM 5 MG/1
5 TABLET, COATED ORAL DAILY
Qty: 90 TABLET | Refills: 3 | Status: SHIPPED | OUTPATIENT
Start: 2024-08-09 | End: 2024-08-09

## 2024-08-09 RX ORDER — BLOOD SUGAR DIAGNOSTIC
STRIP MISCELLANEOUS
Qty: 100 STRIP | Refills: 2 | Status: SHIPPED | OUTPATIENT
Start: 2024-08-09

## 2024-08-09 RX ORDER — IRBESARTAN 300 MG/1
300 TABLET ORAL AT BEDTIME
Qty: 90 TABLET | Refills: 3 | Status: SHIPPED | OUTPATIENT
Start: 2024-08-09

## 2024-08-09 RX ORDER — METFORMIN HCL 500 MG
1000 TABLET, EXTENDED RELEASE 24 HR ORAL
Qty: 180 TABLET | Refills: 3 | Status: SHIPPED | OUTPATIENT
Start: 2024-08-09

## 2024-08-09 ASSESSMENT — PAIN SCALES - GENERAL: PAINLEVEL: NO PAIN (0)

## 2024-08-09 NOTE — PROGRESS NOTES
Preventive Care Visit  [facility] ROGER Peres MD, Internal Medicine            Subjective   Ashley is a 83 year old, presenting for the following:    She is doing well but has a few issues.    She has had some anal discomfort just recently with constipation.  No bleeding or abdominal pain.    She has diabetes.  She checks her sugars daily and for the most part they have been good.  She is up-to-date on eye exam and has no sores on her feet or toes.    Only at night she can have some muscle aches in her arms and legs.  Not during the day and no weakness.  She had does have a history of statin induced aches in the past.    She is exercising some now.               Past Medical History:      Past Medical History:   Diagnosis Date    Anxiety     rare ativan use    Atypical chest pain 2013    neg est echo    Chest tightness 03/2019    neg est echo    CKD (chronic kidney disease) stage 3, GFR 30-59 ml/min (H)     Dizziness 2014    neg est echo    Glaucoma     Gout attack 06/29/2015, 9/19    elevated uric acid    HTN (hypertension)     stopped hctz 2015 due to gout, side effects with aldactone    Hx of colonoscopy 2008, 2018    tics    Hypercholesteremia     added rx 12/12, aches with zocor, then added lipitor 2015, aches with lipitor 2020, changed to crestor 12/20    OA (osteoarthritis)     Peripheral neuropathy     Stroke (H) 2004    intimal dissection    Type II or unspecified type diabetes mellitus without mention of complication, not stated as uncontrolled     diet controlled; added metformin 9/16/19    Vertigo 2004    hosp fsd, mri pos    Vitamin D deficiency 2013             Past Surgical History:      Past Surgical History:   Procedure Laterality Date    CATARACT EXTRACTION  2024    COLONOSCOPY N/A 08/23/2018    Procedure: COLONOSCOPY;  colonoscopy;  Surgeon: Alexei Lea MD;  Location:  GI    ECTOPIC PREGNANCY SURGERY  80's    HYSTERECTOMY, PAP NO LONGER INDICATED  01/01/1980     fibriods and endometriosis, maggy and bso             Social History:     Social History     Socioeconomic History    Marital status:      Spouse name: Not on file    Number of children: 2    Years of education: Not on file    Highest education level: Not on file   Occupational History    Occupation: retired, worked for [employer]   Tobacco Use    Smoking status: Never    Smokeless tobacco: Never   Substance and Sexual Activity    Alcohol use: No     Alcohol/week: 0.0 standard drinks of alcohol    Drug use: No    Sexual activity: Not Currently   Other Topics Concern    Parent/sibling w/ CABG, MI or angioplasty before 65F 55M? Not Asked   Social History Narrative    Not on file     Social Determinants of Health     Financial Resource Strain: Low Risk  (8/5/2024)    Financial Resource Strain     Within the past 12 months, have you or your family members you live with been unable to get utilities (heat, electricity) when it was really needed?: No   Food Insecurity: Low Risk  (8/5/2024)    Food Insecurity     Within the past 12 months, did you worry that your food would run out before you got money to buy more?: No     Within the past 12 months, did the food you bought just not last and you didn t have money to get more?: No   Transportation Needs: Low Risk  (8/5/2024)    Transportation Needs     Within the past 12 months, has lack of transportation kept you from medical appointments, getting your medicines, non-medical meetings or appointments, work, or from getting things that you need?: No   Physical Activity: Insufficiently Active (8/5/2024)    Exercise Vital Sign     Days of Exercise per Week: 3 days     Minutes of Exercise per Session: 20 min   Stress: No Stress Concern Present (8/5/2024)    [employer] - Occupational Stress Questionnaire     Feeling of Stress : Not at all   Social Connections: Unknown (8/5/2024)    Social Connection and Isolation Panel [NHANES]     Frequency of  "Communication with Friends and Family: Not on file     Frequency of Social Gatherings with Friends and Family: Patient declined     Attends Mandaeism Services: Not on file     Active Member of Clubs or Organizations: Not on file     Attends Club or Organization Meetings: Not on file     Marital Status: Not on file   Interpersonal Safety: Not on file   Housing Stability: Low Risk  (8/5/2024)    Housing Stability     Do you have housing? : Yes     Are you worried about losing your housing?: No             Family History:   reviewed         Allergies:   No Known Allergies          Medications:     Current Outpatient Medications   Medication Sig Dispense Refill    amLODIPine (NORVASC) 10 MG tablet Take 1 tablet (10 mg) by mouth daily 90 tablet 3    aspirin 325 MG EC tablet Take 1 tablet by mouth daily. 100 tablet 3    blood glucose (ACCU-CHEK DEIDRA PLUS) test strip Use to test blood sugar 1 times daily or as directed. 100 strip 2    blood glucose monitoring (ACCU-CHEK FASTCLIX) lancets 1 each daily 90 each 4    brimonidine (ALPHAGAN P) 0.1 % ophthalmic solution Place 1 drop into both eyes 2 times daily.      Cholecalciferol (VITAMIN D) 2000 UNITS CAPS Take 2,000 Units by mouth.      irbesartan (AVAPRO) 300 MG tablet Take 1 tablet (300 mg) by mouth at bedtime 90 tablet 3    metFORMIN (GLUCOPHAGE XR) 500 MG 24 hr tablet Take 2 tablets (1,000 mg) by mouth daily (with dinner) 180 tablet 3    MULTIPLE VITAMIN PO Take  by mouth. 2 childrens multi vitamins daily.       UNABLE TO FIND MEDICATION NAME: VYZULTA                 Review of Systems:     The 10 point Review of Systems is negative other than noted in the HPI           Physical Exam:   Blood pressure 137/71, pulse 66, temperature 97.7  F (36.5  C), temperature source Temporal, resp. rate 16, height 1.658 m (5' 5.28\"), weight 69.9 kg (154 lb), SpO2 100%, not currently breastfeeding.    Exam:  Constitutional: healthy appearing, alert and in no distress  Heent: " Normocephalic. Head without obvious masses or lesions. PERRLDC, EOMI. Mouth exam within normal limits: tongue, mucous membranes, posterior pharynx all normal, no lesions or abnormalities seen.  Tm's and canals within normal limits bilaterally. Neck supple, no nuchal rigidity or masses. No supraclavicular, or cervical adenopathy. Thyroid symmetric, no masses.  Cardiovascular: Regular rate and rhythm, no murmer, rub or gallops.  JVP not elevated, no edema.  Carotids within normal limits bilaterally, no bruits.  Respiratory: Normal respiratory effort.  Lungs clear, normal flow, no wheezing or crackles.  Breasts: Normal bilaterally.  No masses or lesions.  Nipples within normal limits.  No axillary lesions or nodes.  My M.A. Was present during this part of the examination.  Gastrointestinal: Normal active bowel sounds.   Soft, not tender, no masses, guarding or rebound.  No hepatosplenomegaly.   Musculoskeletal: extremities normal, no gross deformities noted.  Skin: no suspicious lesions or rashes   Neurologic: Mental status within normal limits.  Speech fluent.  No gross motor abnormalities and gait intact.  Psychiatric: mentation appears normal and affect normal.  Feet within normal limits bilat, no sores, intact pulses         Data:   Labs sent        Assessment:   Normal complete physical exam  Aches, suspect statin, will check labs and have her stop the Crestor and she will let me know if her symptoms do not resolve.  She really does not tolerate statins so can consider Zetia next time  CKD, labs today, blood pressure fine and on irbesartan  Diabetes, controlled, labs today  Hypertension, controlled  Hyperlipidemia, statin intolerant  Vitamin D deficiency, labs fine in the past  Neuropathy  Gout, no issues  Anxiety, controlled  Healthcare maintenance         Plan:   Vaccines at pharmacy  She does not want bone density  Exercise and diet  Stop Crestor, call if aches not resolving  For the anal discomfort try  Calmoseptine but if symptoms not gone soon let me know  Follow-up in 6 months      Julio César Peres M.D.          No chief complaint on file.          Health Care Directive  Patient does not have a Health Care Directive or Living Will: Advance Directive received and scanned. Click on Code in the patient header to view.    HPI              8/5/2024   General Health   How would you rate your overall physical health? Good   Feel stress (tense, anxious, or unable to sleep) Not at all            8/5/2024   Nutrition   Diet: Low salt    Low fat/cholesterol    Diabetic       Multiple values from one day are sorted in reverse-chronological order         8/5/2024   Exercise   Days per week of moderate/strenous exercise 3 days   Average minutes spent exercising at this level 20 min            8/5/2024   Social Factors   Frequency of gathering with friends or relatives Patient declined   Worry food won't last until get money to buy more No   Food not last or not have enough money for food? No   Do you have housing? (Housing is defined as stable permanent housing and does not include staying ouside in a car, in a tent, in an abandoned building, in an overnight shelter, or couch-surfing.) Yes   Are you worried about losing your housing? No   Lack of transportation? No   Unable to get utilities (heat,electricity)? No            8/5/2024   Fall Risk   Fallen 2 or more times in the past year? No    No   Trouble with walking or balance? No    No       Multiple values from one day are sorted in reverse-chronological order          8/5/2024   Activities of Daily Living- Home Safety   Needs help with the following daily activites None of the above   Safety concerns in the home None of the above            8/5/2024   Dental   Dentist two times every year? Yes            8/5/2024   Hearing Screening   Hearing concerns? None of the above            8/5/2024   Driving Risk Screening   Patient/family members have concerns about driving No             8/5/2024   General Alertness/Fatigue Screening   Have you been more tired than usual lately? (!) YES            8/5/2024   Urinary Incontinence Screening   Bothered by leaking urine in past 6 months No            8/5/2024   TB Screening   Were you born outside of the [place]? No            Today's PHQ-2 Score:       8/9/2024     8:29 AM   PHQ-2 ( 1999 [employer])   Q1: Little interest or pleasure in doing things 0   Q2: Feeling down, depressed or hopeless 0   PHQ-2 Score 0   Q1: Little interest or pleasure in doing things Not at all   Q2: Feeling down, depressed or hopeless Not at all   PHQ-2 Score 0           8/5/2024   Substance Use   Alcohol more than 3/day or more than 7/wk Not Applicable   Do you have a current opioid prescription? No   How severe/bad is pain from 1 to 10? 0/10 (No Pain)   Do you use any other substances recreationally? No        Social History     Tobacco Use    Smoking status: Never    Smokeless tobacco: Never   Substance Use Topics    Alcohol use: No     Alcohol/week: 0.0 standard drinks of alcohol    Drug use: No                        Reviewed and updated as needed this visit by Provider                      Current providers sharing in care for this patient include:  Patient Care Team:  Julio César Peres MD as PCP - General (Internal Medicine)  Julio César Peres MD as Assigned PCP  Raji Quiñonez DO as Assigned Musculoskeletal Provider    The following health maintenance items are reviewed in Epic and correct as of today:  Health Maintenance   Topic Date Due    DEXA  Never done    ANNUAL REVIEW OF HM ORDERS  Never done    RSV VACCINE (Pregnancy & 60+) (1 - 1-dose 60+ series) Never done    COVID-19 Vaccine (7 - 2023-24 season) 10/02/2023    BMP  08/07/2024    LIPID  08/07/2024    MICROALBUMIN  08/07/2024    DIABETIC FOOT EXAM  08/07/2024    MEDICARE ANNUAL WELLNESS VISIT  08/07/2024    HEMOGLOBIN  08/07/2024    INFLUENZA VACCINE (1) 09/01/2024    A1C  10/12/2024    EYE EXAM   "12/21/2024    FALL RISK ASSESSMENT  08/09/2025    ADVANCE CARE PLANNING  08/07/2028    DTAP/TDAP/TD IMMUNIZATION (3 - Td or Tdap) 08/07/2033    PHQ-2 (once per calendar year)  Completed    Pneumococcal Vaccine: 65+ Years  Completed    URINALYSIS  Completed    ZOSTER IMMUNIZATION  Completed    IPV IMMUNIZATION  Aged Out    HPV IMMUNIZATION  Aged Out    MENINGITIS IMMUNIZATION  Aged Out    RSV MONOCLONAL ANTIBODY  Aged Out            Objective    Exam  There were no vitals taken for this visit.   Estimated body mass index is 24.53 kg/m  as calculated from the following:    Height as of 4/12/24: 1.676 m (5' 6\").    Weight as of 4/12/24: 68.9 kg (152 lb).    Physical Exam          8/9/2024   Mini Cog   Clock Draw Score 2 Normal   3 Item Recall 3 objects recalled   Mini Cog Total Score 5                 Signed Electronically by: Julio César Peres MD    "

## 2024-08-09 NOTE — RESULT ENCOUNTER NOTE
It was very nice seeing you.  You should be able to view your test results.    Your CBC or blood count is normal with no signs of anemia or blood disorders.  Your chemistry panel shows well-controlled diabetes with hemoglobin A1c of 6.6.  Your blood salts, kidney test, liver tests, proteins, thyroid test, and urine are all normal.  Your muscle enzyme test or CK is also normal.    Your total cholesterol is very good at 134.  Your HDL or good cholesterol and LDL or bad cholesterol are excellent as well.    I am happy to bring in this report.  Please let me know if you have questions.    Julio César Peres M.D.

## 2024-08-09 NOTE — PATIENT INSTRUCTIONS
Try calmoseptine for the anal soreness, you can use this twice daily, let me know if this does not take care of it.    I would get a covid shot now and the rsv and flu shots in the fall at your pharmacy.    Stop the rosuvastatin and let's see if your aches resolve.  It can take up to 3 to 4 weeks for them to go away, if they do not let me know.     Patient Education   Preventive Care Advice   This is general advice given by our system to help you stay healthy. However, your care team may have specific advice just for you. Please talk to your care team about your preventive care needs.  Nutrition  Eat 5 or more servings of fruits and vegetables each day.  Try wheat bread, brown rice and whole grain pasta (instead of white bread, rice, and pasta).  Get enough calcium and vitamin D. Check the label on foods and aim for 100% of the RDA (recommended daily allowance).  Lifestyle  Exercise at least 150 minutes each week  (30 minutes a day, 5 days a week).  Do muscle strengthening activities 2 days a week. These help control your weight and prevent disease.  No smoking.  Wear sunscreen to prevent skin cancer.  Have a dental exam and cleaning every 6 months.  Yearly exams  See your health care team every year to talk about:  Any changes in your health.  Any medicines your care team has prescribed.  Preventive care, family planning, and ways to prevent chronic diseases.  Shots (vaccines)   HPV shots (up to age 26), if you've never had them before.  Hepatitis B shots (up to age 59), if you've never had them before.  COVID-19 shot: Get this shot when it's due.  Flu shot: Get a flu shot every year.  Tetanus shot: Get a tetanus shot every 10 years.  Pneumococcal, hepatitis A, and RSV shots: Ask your care team if you need these based on your risk.  Shingles shot (for age 50 and up)  General health tests  Diabetes screening:  Starting at age 35, Get screened for diabetes at least every 3 years.  If you are younger than age 35, ask  your care team if you should be screened for diabetes.  Cholesterol test: At age 39, start having a cholesterol test every 5 years, or more often if advised.  Bone density scan (DEXA): At age 50, ask your care team if you should have this scan for osteoporosis (brittle bones).  Hepatitis C: Get tested at least once in your life.  STIs (sexually transmitted infections)  Before age 24: Ask your care team if you should be screened for STIs.  After age 24: Get screened for STIs if you're at risk. You are at risk for STIs (including HIV) if:  You are sexually active with more than one person.  You don't use condoms every time.  You or a partner was diagnosed with a sexually transmitted infection.  If you are at risk for HIV, ask about PrEP medicine to prevent HIV.  Get tested for HIV at least once in your life, whether you are at risk for HIV or not.  Cancer screening tests  Cervical cancer screening: If you have a cervix, begin getting regular cervical cancer screening tests starting at age 21.  Breast cancer scan (mammogram): If you've ever had breasts, begin having regular mammograms starting at age 40. This is a scan to check for breast cancer.  Colon cancer screening: It is important to start screening for colon cancer at age 45.  Have a colonoscopy test every 10 years (or more often if you're at risk) Or, ask your provider about stool tests like a FIT test every year or Cologuard test every 3 years.  To learn more about your testing options, visit:   .  For help making a decision, visit:   https://bit.ly/zr09349.  Prostate cancer screening test: If you have a prostate, ask your care team if a prostate cancer screening test (PSA) at age 55 is right for you.  Lung cancer screening: If you are a current or former smoker ages 50 to 80, ask your care team if ongoing lung cancer screenings are right for you.  For informational purposes only. Not to replace the advice of your health care provider. Copyright   2023  [facility]. All rights reserved. Clinically reviewed by the [facility]. [facility] 190194 - REV 01/24.  Learning About Sleeping Well  What does sleeping well mean?     Sleeping well means getting enough sleep to feel good and stay healthy. How much sleep is enough varies among people.  The number of hours you sleep and how you feel when you wake up are both important. If you do not feel refreshed, you probably need more sleep. Another sign of not getting enough sleep is feeling tired during the day.  Experts recommend that adults get at least 7 or more hours of sleep per day. Children and older adults need more sleep.  Why is getting enough sleep important?  Getting enough quality sleep is a basic part of good health. When your sleep suffers, your physical health, mood, and your thoughts can suffer too. You may find yourself feeling more grumpy or stressed. Not getting enough sleep also can lead to serious problems, including injury, accidents, anxiety, and depression.  What might cause poor sleeping?  Many things can cause sleep problems, including:  Changes to your sleep schedule.  Stress. Stress can be caused by fear about a single event, such as giving a speech. Or you may have ongoing stress, such as worry about work or school.  Depression, anxiety, and other mental or emotional conditions.  Changes in your sleep habits or surroundings. This includes changes that happen where you sleep, such as noise, light, or sleeping in a different bed. It also includes changes in your sleep pattern, such as having jet lag or working a late shift.  Health problems, such as pain, breathing problems, and restless legs syndrome.  Lack of regular exercise.  Using alcohol, nicotine, or caffeine before bed.  How can you help yourself?  Here are some tips that may help you sleep more soundly and wake up feeling more refreshed.  Your sleeping area   Use your bedroom only for sleeping and sex.  "A bit of light reading may help you fall asleep. But if it doesn't, do your reading elsewhere in the house. Try not to use your TV, computer, smartphone, or tablet while you are in bed.  Be sure your bed is big enough to stretch out comfortably, especially if you have a sleep partner.  Keep your bedroom quiet, dark, and cool. Use curtains, blinds, or a sleep mask to block out light. To block out noise, use earplugs, soothing music, or a \"white noise\" machine.  Your evening and bedtime routine   Create a relaxing bedtime routine. You might want to take a warm shower or bath, or listen to soothing music.  Go to bed at the same time every night. And get up at the same time every morning, even if you feel tired.  What to avoid   Limit caffeine (coffee, tea, caffeinated sodas) during the day, and don't have any for at least 6 hours before bedtime.  Avoid drinking alcohol before bedtime. Alcohol can cause you to wake up more often during the night.  Try not to smoke or use tobacco, especially in the evening. Nicotine can keep you awake.  Limit naps during the day, especially close to bedtime.  Avoid lying in bed awake for too long. If you can't fall asleep or if you wake up in the middle of the night and can't get back to sleep within about 20 minutes, get out of bed and go to another room until you feel sleepy.  Avoid taking medicine right before bed that may keep you awake or make you feel hyper or energized. Your doctor can tell you if your medicine may do this and if you can take it earlier in the day.  If you can't sleep   Imagine yourself in a peaceful, pleasant scene. Focus on the details and feelings of being in a place that is relaxing.  Get up and do a quiet or boring activity until you feel sleepy.  Avoid drinking any liquids before going to bed to help prevent waking up often to use the bathroom.  Where can you learn more?  Go to https://www.TabUp[employer].net/patiented  Enter J942 in the search box to learn more " "about \"Learning About Sleeping Well.\"  Current as of: July 10, 2023  Content Version: 14.1 2006-2024 [employer].   Care instructions adapted under license by your healthcare professional. If you have questions about a medical condition or this instruction, always ask your healthcare professional. [employer] disclaims any warranty or liability for your use of this information.       "

## 2024-10-17 ENCOUNTER — TRANSFERRED RECORDS (OUTPATIENT)
Dept: MULTI SPECIALTY CLINIC | Facility: CLINIC | Age: 84
End: 2024-10-17

## 2024-10-17 LAB — RETINOPATHY: NORMAL

## 2025-02-10 ENCOUNTER — APPOINTMENT (OUTPATIENT)
Dept: LAB | Facility: CLINIC | Age: 85
End: 2025-02-10
Payer: MEDICARE

## 2025-02-10 ENCOUNTER — OFFICE VISIT (OUTPATIENT)
Dept: FAMILY MEDICINE | Facility: CLINIC | Age: 85
End: 2025-02-10
Payer: MEDICARE

## 2025-02-10 VITALS
OXYGEN SATURATION: 100 % | RESPIRATION RATE: 16 BRPM | TEMPERATURE: 97.3 F | WEIGHT: 151 LBS | HEART RATE: 86 BPM | HEIGHT: 65 IN | SYSTOLIC BLOOD PRESSURE: 138 MMHG | DIASTOLIC BLOOD PRESSURE: 70 MMHG | BODY MASS INDEX: 25.16 KG/M2

## 2025-02-10 DIAGNOSIS — R10.9 RIGHT FLANK PAIN: ICD-10-CM

## 2025-02-10 DIAGNOSIS — N18.31 STAGE 3A CHRONIC KIDNEY DISEASE (H): Primary | ICD-10-CM

## 2025-02-10 DIAGNOSIS — N18.31 TYPE 2 DIABETES MELLITUS WITH STAGE 3A CHRONIC KIDNEY DISEASE, WITHOUT LONG-TERM CURRENT USE OF INSULIN (H): ICD-10-CM

## 2025-02-10 DIAGNOSIS — E78.5 HYPERLIPIDEMIA LDL GOAL <100: ICD-10-CM

## 2025-02-10 DIAGNOSIS — F41.9 ANXIETY: ICD-10-CM

## 2025-02-10 DIAGNOSIS — G62.9 PERIPHERAL POLYNEUROPATHY: ICD-10-CM

## 2025-02-10 DIAGNOSIS — I10 ESSENTIAL HYPERTENSION, BENIGN: ICD-10-CM

## 2025-02-10 DIAGNOSIS — E55.9 VITAMIN D DEFICIENCY: ICD-10-CM

## 2025-02-10 DIAGNOSIS — E11.22 TYPE 2 DIABETES MELLITUS WITH STAGE 3A CHRONIC KIDNEY DISEASE, WITHOUT LONG-TERM CURRENT USE OF INSULIN (H): ICD-10-CM

## 2025-02-10 LAB
ALBUMIN UR-MCNC: NEGATIVE MG/DL
APPEARANCE UR: ABNORMAL
BACTERIA #/AREA URNS HPF: ABNORMAL /HPF
BILIRUB UR QL STRIP: NEGATIVE
CHOLEST SERPL-MCNC: 182 MG/DL
COLOR UR AUTO: YELLOW
EST. AVERAGE GLUCOSE BLD GHB EST-MCNC: 137 MG/DL
FASTING STATUS PATIENT QL REPORTED: YES
GLUCOSE UR STRIP-MCNC: NEGATIVE MG/DL
HBA1C MFR BLD: 6.4 % (ref 0–5.6)
HDLC SERPL-MCNC: 59 MG/DL
HGB UR QL STRIP: NEGATIVE
KETONES UR STRIP-MCNC: NEGATIVE MG/DL
LDLC SERPL CALC-MCNC: 99 MG/DL
LEUKOCYTE ESTERASE UR QL STRIP: NEGATIVE
NITRATE UR QL: NEGATIVE
NONHDLC SERPL-MCNC: 123 MG/DL
PH UR STRIP: 5.5 [PH] (ref 5–7)
RBC #/AREA URNS AUTO: ABNORMAL /HPF
SP GR UR STRIP: 1.02 (ref 1–1.03)
SQUAMOUS #/AREA URNS AUTO: ABNORMAL /LPF
TRIGL SERPL-MCNC: 121 MG/DL
UROBILINOGEN UR STRIP-ACNC: 0.2 E.U./DL
WBC #/AREA URNS AUTO: ABNORMAL /HPF

## 2025-02-10 PROCEDURE — G2211 COMPLEX E/M VISIT ADD ON: HCPCS | Performed by: INTERNAL MEDICINE

## 2025-02-10 PROCEDURE — 99214 OFFICE O/P EST MOD 30 MIN: CPT | Performed by: INTERNAL MEDICINE

## 2025-02-10 PROCEDURE — 80061 LIPID PANEL: CPT | Performed by: INTERNAL MEDICINE

## 2025-02-10 PROCEDURE — 81001 URINALYSIS AUTO W/SCOPE: CPT | Performed by: INTERNAL MEDICINE

## 2025-02-10 PROCEDURE — 83036 HEMOGLOBIN GLYCOSYLATED A1C: CPT | Performed by: INTERNAL MEDICINE

## 2025-02-10 PROCEDURE — 36415 COLL VENOUS BLD VENIPUNCTURE: CPT | Performed by: INTERNAL MEDICINE

## 2025-02-10 ASSESSMENT — PAIN SCALES - GENERAL: PAINLEVEL_OUTOF10: NO PAIN (0)

## 2025-02-10 NOTE — PROGRESS NOTES
This is an 84-year-old with multiple medical problems here for follow-up.  Patient has diabetes with CKD.  Her last labs 6 months ago showed no proteinuria.  Her medications are listed.  Her blood pressure at home is even better than she gets here.  She checks her sugars daily and they are very good, last A1c of 6.6.  She has neuropathy.  She has mild anxiety.  She has a history of vitamin D deficiency.    The patient has hyperlipidemia.  She has been statin intolerant.  She is off of them now.  She was having aches in her knees and arms and still has some at night but much better.  During the day she does not have them.    She has had right flank discomfort.  She is unclear how long.  It is mild.  It comes and goes.  No radiating pain except for some low back pain that can radiate down the right buttock and leg.    She has no chest pain or shortness of breath.  She is up-to-date on immunizations.  She is up-to-date on her eye exam.    Past Medical History:   Diagnosis Date    Anxiety     rare ativan use    Atypical chest pain 2013    neg est echo    Chest tightness 03/2019    neg est echo    CKD (chronic kidney disease) stage 3, GFR 30-59 ml/min (H)     Dizziness 2014    neg est echo    Glaucoma     Gout attack 06/29/2015, 9/19    elevated uric acid    HTN (hypertension)     stopped hctz 2015 due to gout, side effects with aldactone    Hx of colonoscopy 2008, 2018    tics    Hypercholesteremia     added rx 12/12, aches with zocor, then added lipitor 2015, aches with lipitor 2020, changed to crestor 12/20    OA (osteoarthritis)     Peripheral neuropathy     Stroke (H) 2004    intimal dissection    Type II or unspecified type diabetes mellitus without mention of complication, not stated as uncontrolled     diet controlled; added metformin 9/16/19    Vertigo 2004    hosp fsd, mri pos    Vitamin D deficiency 2013     Past Surgical History:   Procedure Laterality Date    CATARACT EXTRACTION  2024    COLONOSCOPY N/A  08/23/2018    Procedure: COLONOSCOPY;  colonoscopy;  Surgeon: Alexei Lea MD;  Location:  GI    ECTOPIC PREGNANCY SURGERY  80's    HYSTERECTOMY, PAP NO LONGER INDICATED  01/01/1980    fibriods and endometriosis, maggy and bso     Social History     Socioeconomic History    Marital status:      Spouse name: Not on file    Number of children: 2    Years of education: Not on file    Highest education level: Not on file   Occupational History    Occupation: retired, worked for [employer]   Tobacco Use    Smoking status: Never    Smokeless tobacco: Never   Substance and Sexual Activity    Alcohol use: No     Alcohol/week: 0.0 standard drinks of alcohol    Drug use: No    Sexual activity: Not Currently   Other Topics Concern    Parent/sibling w/ CABG, MI or angioplasty before 65F 55M? Not Asked   Social History Narrative    Not on file     Social Drivers of Health     Financial Resource Strain: Low Risk  (8/5/2024)    Financial Resource Strain     Within the past 12 months, have you or your family members you live with been unable to get utilities (heat, electricity) when it was really needed?: No   Food Insecurity: Low Risk  (8/5/2024)    Food Insecurity     Within the past 12 months, did you worry that your food would run out before you got money to buy more?: No     Within the past 12 months, did the food you bought just not last and you didn t have money to get more?: No   Transportation Needs: Low Risk  (8/5/2024)    Transportation Needs     Within the past 12 months, has lack of transportation kept you from medical appointments, getting your medicines, non-medical meetings or appointments, work, or from getting things that you need?: No   Physical Activity: Insufficiently Active (8/5/2024)    Exercise Vital Sign     Days of Exercise per Week: 3 days     Minutes of Exercise per Session: 20 min   Stress: No Stress Concern Present (8/5/2024)    [place] [place] [employer] - Occupational Stress  "Questionnaire     Feeling of Stress : Not at all   Social Connections: Unknown (8/5/2024)    Social Connection and Isolation Panel [NHANES]     Frequency of Communication with Friends and Family: Not on file     Frequency of Social Gatherings with Friends and Family: Patient declined     Attends [employer] Services: Not on file     Active Member of Clubs or Organizations: Not on file     Attends Club or Organization Meetings: Not on file     Marital Status: Not on file   Interpersonal Safety: Not on file   Housing Stability: Low Risk  (8/5/2024)    Housing Stability     Do you have housing? : Yes     Are you worried about losing your housing?: No     Current Outpatient Medications   Medication Sig Dispense Refill    amLODIPine (NORVASC) 10 MG tablet Take 1 tablet (10 mg) by mouth daily 90 tablet 3    aspirin 325 MG EC tablet Take 1 tablet by mouth daily. 100 tablet 3    blood glucose (ACCU-CHEK DEIDRA PLUS) test strip Use to test blood sugar 1 times daily or as directed. 100 strip 2    blood glucose monitoring (ACCU-CHEK FASTCLIX) lancets 1 each daily 90 each 4    brimonidine (ALPHAGAN P) 0.1 % ophthalmic solution Place 1 drop into both eyes 2 times daily.      Cholecalciferol (VITAMIN D) 2000 UNITS CAPS Take 2,000 Units by mouth.      irbesartan (AVAPRO) 300 MG tablet Take 1 tablet (300 mg) by mouth at bedtime 90 tablet 3    metFORMIN (GLUCOPHAGE XR) 500 MG 24 hr tablet Take 2 tablets (1,000 mg) by mouth daily (with dinner) 180 tablet 3    MULTIPLE VITAMIN PO Take  by mouth. 2 childrens multi vitamins daily.       UNABLE TO FIND MEDICATION NAME: VYZULTA       No Known Allergies  FAMILY HISTORY NOTED AND REVIEWED    REVIEW OF SYSTEMS: above    PHYSICAL EXAM    /70   Pulse 86   Temp 97.3  F (36.3  C)   Resp 16   Ht 1.658 m (5' 5.28\")   Wt 68.5 kg (151 lb)   SpO2 100%   BMI 24.91 kg/m      Patient appears non toxic  Lungs - clear, normal flow  Cardiovascular - regular rate and rhythm, no murmer, rub or " gallop, no jvp or edema, carotids within normal limits, no bruits.  Abdomen - normal active bowel sounds, soft, non tender, no masses, guarding or rebound, no hepatosplenomegaly  No flank masses or tenderness    Labs sent    ASSESSMENT:  Type 2 diabetes with CKD, stable, on ARB, no urinary protein.  A1c under good control  Peripheral neuropathy, stable  Flank pain, suspect musculoskeletal but I will check urine.  Call if worsens  Muscle aches, not due to statin, mild, follow  Hyperlipidemia, off statin therapy, consider Zetia  Hypertension, controlled  Vitamin D deficiency  Mild anxiety  Healthcare maintenance    PLAN:  She is up-to-date on immunizations and labs  Call if flank discomfort worsens or is not gone soon but will check urine  Letter with labs  Consider Zetia  Follow-up in 6 months      Julio César Peres M.D.

## 2025-02-10 NOTE — RESULT ENCOUNTER NOTE
12/11/2024     E-Consult has been accepted.    Interprofessional consultation requested by:  Carol Dickinson MD      Clinical Question/Purpose: MY CLINICAL QUESTION IS: nafld. Is there more to do, does she need referral to gi/ hepatology.     Patient assessment and information reviewed:   69-year-old female with a BMI of 26, prediabetes and dyslipidemia.  Of note the patient has binge eating disorder.    The patient has never had abnormal AST, ALT, alkaline phosphatase or total bilirubin dating back to 2018; no records prior to that. The patient has a platelet count greater than 150 which argues against portal hypertension.  The patient has an albumin of 4.3 which suggest intact hepatic synthetic function.  There is no INR for review.    Other Labs:  TSH 1.83  Hepatitis C antibody negative in 2019    Imaging:  Abdominal ultrasound in October 2016 demonstrated hepatic steatosis.  No spleen was visualized given it was a limited ultrasound.    I am unable to find any additional imaging after 2016 of the patient's liver.    Overall: The patient was demonstrated to have hepatic steatosis 8 years ago on abdominal imaging but no additional imaging since that time.  The patient has normal liver studies.  The patient has multiple metabolic risk factors including overweight status, prediabetes and dyslipidemia, therefore this is likely MASLD (metabolic associated steatotic liver disease).  Alcohol use is documented as social but I am unclear what that means.    The patient has a FIB-4 of 1.81; the cutoff for advanced fibrosis in patients over 65 is 2.0.  This suggests against advanced fibrosis.  Lab studies did not demonstrate any concern for portal hypertension    Recommendations:   Investigation  -- Labs:   Hepatitis B surface antigen, antibody and hepatitis B core antibody; if nonimmune recommend vaccination   Iron studies and ferritin; if greater than 45% recommend HFE testing  TTG IgA/IgG and IgA testing for celiac  It was very nice to see you.  Your hemoglobin A1c is very good at 6.4.  Your urine does not have any blood in it.  There is some suggestion of a possible urine infection.  However, if you are not having symptoms such as frequency, burning or discoloration there really is nothing to do.  If you do have those symptoms just let me know.    Your total cholesterol is 182 with the normal range being below 200.  Your HDL or good cholesterol is 59 with the normal range being above 50.  Your LDL or bad cholesterol is 99 with the normal range being below 130.  These numbers are very good.    If you have any questions please let me know.    Julio César Peres M.D.       disease  -- Fibroscan to assess for fibrosis   * If there is no evidence of advanced fibrosis (F3 or higher) then the patient would benefit from aggressive metabolic syndrome management and repeat FIB-4 at least yearly   * If there is evidence of advanced fibrosis (F3 or higher), recommend formal referral to hepatology  -- Asked the patient exactly how many alcohol-containing beverages she consumes on average per week; if any concerns, can perform phosphatidylethanol (PEth) testing    Counseling  -- Lifestyle and dietary changes with the goal of 7-10% weight loss; given the patient has binge eating disorder she may benefit from a referral to the metabolic weight management clinic for dietitian/nutrition and health  counseling.  -- Limit portion sizes and exclude MASLD promoting components: carbohydrates, sugars, processed food, foods & beverages high in added fructose  -- Recommend aerobic exercise and resistance training at least three days a week with gradual increase over time  -- Minimize alcohol; women should drink no more than 1 alcohol-containing beverage per day    The recommendations provided in this E-Consult are based on a review of clinical data pertinent to the clinical question presented, without a review of the patient's complete medical record or, the benefit of a comprehensive in-person or virtual patient evaluation. This consultation should not replace the clinical judgement and evaluation of the provider ordering this E-Consult. Any new clinical issues, or changes in patient status since the filing of this E-Consult will need to be taken into account when assessing these recommendations. Please contact me if you have further questions.    My total time spent reviewing clinical information and formulating assessment was 20 minutes.

## 2025-03-22 ENCOUNTER — OFFICE VISIT (OUTPATIENT)
Dept: URGENT CARE | Facility: URGENT CARE | Age: 85
End: 2025-03-22
Payer: MEDICARE

## 2025-03-22 VITALS
OXYGEN SATURATION: 99 % | TEMPERATURE: 96.2 F | HEART RATE: 72 BPM | BODY MASS INDEX: 25.08 KG/M2 | WEIGHT: 152 LBS | DIASTOLIC BLOOD PRESSURE: 77 MMHG | SYSTOLIC BLOOD PRESSURE: 153 MMHG

## 2025-03-22 DIAGNOSIS — I95.9 HYPOTENSION, UNSPECIFIED HYPOTENSION TYPE: Primary | ICD-10-CM

## 2025-03-22 LAB
ALBUMIN UR-MCNC: NEGATIVE MG/DL
ANION GAP SERPL CALCULATED.3IONS-SCNC: 13 MMOL/L (ref 3–14)
APPEARANCE UR: CLEAR
BASOPHILS # BLD AUTO: 0.1 10E3/UL (ref 0–0.2)
BASOPHILS NFR BLD AUTO: 1 %
BILIRUB UR QL STRIP: NEGATIVE
BUN SERPL-MCNC: 15 MG/DL (ref 7–30)
CALCIUM SERPL-MCNC: 10.4 MG/DL (ref 8.5–10.1)
CHLORIDE BLD-SCNC: 103 MMOL/L (ref 94–109)
CO2 SERPL-SCNC: 25 MMOL/L (ref 20–32)
COLOR UR AUTO: YELLOW
CREAT SERPL-MCNC: 1.1 MG/DL (ref 0.52–1.04)
EGFRCR SERPLBLD CKD-EPI 2021: 50 ML/MIN/1.73M2
EOSINOPHIL # BLD AUTO: 0.2 10E3/UL (ref 0–0.7)
EOSINOPHIL NFR BLD AUTO: 3 %
ERYTHROCYTE [DISTWIDTH] IN BLOOD BY AUTOMATED COUNT: 14.1 % (ref 10–15)
GLUCOSE BLD-MCNC: 113 MG/DL (ref 70–99)
GLUCOSE UR STRIP-MCNC: NEGATIVE MG/DL
HCT VFR BLD AUTO: 41.7 % (ref 35–47)
HGB BLD-MCNC: 13.6 G/DL (ref 11.7–15.7)
HGB UR QL STRIP: NEGATIVE
IMM GRANULOCYTES # BLD: 0 10E3/UL
IMM GRANULOCYTES NFR BLD: 0 %
KETONES UR STRIP-MCNC: NEGATIVE MG/DL
LEUKOCYTE ESTERASE UR QL STRIP: NEGATIVE
LYMPHOCYTES # BLD AUTO: 2.9 10E3/UL (ref 0.8–5.3)
LYMPHOCYTES NFR BLD AUTO: 47 %
MCH RBC QN AUTO: 30.3 PG (ref 26.5–33)
MCHC RBC AUTO-ENTMCNC: 32.6 G/DL (ref 31.5–36.5)
MCV RBC AUTO: 93 FL (ref 78–100)
MONOCYTES # BLD AUTO: 0.6 10E3/UL (ref 0–1.3)
MONOCYTES NFR BLD AUTO: 9 %
NEUTROPHILS # BLD AUTO: 2.5 10E3/UL (ref 1.6–8.3)
NEUTROPHILS NFR BLD AUTO: 40 %
NITRATE UR QL: NEGATIVE
NRBC # BLD AUTO: 0 10E3/UL
NRBC BLD AUTO-RTO: 0 /100
PH UR STRIP: 5.5 [PH] (ref 5–7)
PLATELET # BLD AUTO: 311 10E3/UL (ref 150–450)
POTASSIUM BLD-SCNC: 4.4 MMOL/L (ref 3.4–5.3)
RBC # BLD AUTO: 4.49 10E6/UL (ref 3.8–5.2)
SODIUM SERPL-SCNC: 141 MMOL/L (ref 135–145)
SP GR UR STRIP: <=1.005 (ref 1–1.03)
UROBILINOGEN UR STRIP-ACNC: 0.2 E.U./DL
WBC # BLD AUTO: 6.3 10E3/UL (ref 4–11)

## 2025-03-22 PROCEDURE — 80048 BASIC METABOLIC PNL TOTAL CA: CPT | Performed by: FAMILY MEDICINE

## 2025-03-22 PROCEDURE — 93005 ELECTROCARDIOGRAM TRACING: CPT | Performed by: FAMILY MEDICINE

## 2025-03-22 PROCEDURE — 3077F SYST BP >= 140 MM HG: CPT | Performed by: FAMILY MEDICINE

## 2025-03-22 PROCEDURE — 3078F DIAST BP <80 MM HG: CPT | Performed by: FAMILY MEDICINE

## 2025-03-22 PROCEDURE — 99214 OFFICE O/P EST MOD 30 MIN: CPT | Performed by: FAMILY MEDICINE

## 2025-03-22 PROCEDURE — 36415 COLL VENOUS BLD VENIPUNCTURE: CPT | Performed by: FAMILY MEDICINE

## 2025-03-22 PROCEDURE — 81003 URINALYSIS AUTO W/O SCOPE: CPT | Performed by: FAMILY MEDICINE

## 2025-03-22 PROCEDURE — 85025 COMPLETE CBC W/AUTO DIFF WBC: CPT | Performed by: FAMILY MEDICINE

## 2025-03-22 RX ORDER — ASPIRIN 81 MG/1
81 TABLET ORAL DAILY
COMMUNITY

## 2025-03-22 NOTE — PATIENT INSTRUCTIONS
Your blood work looks okay and urine    Decrease to 5mg norvasc    Follow up with Dr. Peres next week

## 2025-03-22 NOTE — PROGRESS NOTES
"Assessment & Plan     Hypotension, unspecified hypotension type  EKG no acute  CBC sent out  HGB wnl  BMP unremarkable/no change  Decrease norvasc to 5 mg daily  Follow up with PCP next week  - EKG 12-lead, tracing only  - Basic metabolic panel  (Ca, Cl, CO2, Creat, Gluc, K, Na, BUN)  - CBC with platelets and differential  - UA Macroscopic with reflex to Microscopic and Culture - Clinic Collect  - Basic metabolic panel  (Ca, Cl, CO2, Creat, Gluc, K, Na, BUN)  - CBC with platelets and differential             No follow-ups on file.    Reji Silverman MD  [facility]ER Ramirez is a 84 year old female who presents to clinic today for the following health issues:  Chief Complaint   Patient presents with    Hypotension     She says her BP dropped low. Called nurse and was told to come in to be seen. BP check was 90/57, 88/57. She fainted from low blood pressure on March 4th She did not see her provider after fainting       HPI    Here with concern about BP dropped.  Some pain in right side of back.  No fever  Feels \"different\"  Not dizzy now.  No pain in chest or trouble breathing.    Has book with BP readings. All are about 110's  She is on avapro and norvasc daily    She did faint earlier this month.        Review of Systems        Objective    BP (!) 153/77   Pulse 72   Temp (!) 96.2  F (35.7  C)   Wt 68.9 kg (152 lb)   SpO2 99%   BMI 25.08 kg/m    Physical Exam  Vitals and nursing note reviewed.   Constitutional:       Appearance: Normal appearance.   HENT:      Right Ear: Tympanic membrane and ear canal normal.      Left Ear: Tympanic membrane and ear canal normal.      Mouth/Throat:      Mouth: Mucous membranes are moist.   Eyes:      Pupils: Pupils are equal, round, and reactive to light.   Cardiovascular:      Rate and Rhythm: Normal rate and regular rhythm.      Pulses: Normal pulses.      Heart sounds: Normal heart sounds.   Pulmonary:      Effort: Pulmonary effort " is normal.      Breath sounds: Normal breath sounds.   Musculoskeletal:      Cervical back: Neck supple.   Neurological:      Mental Status: She is alert.

## 2025-03-31 ENCOUNTER — OFFICE VISIT (OUTPATIENT)
Dept: FAMILY MEDICINE | Facility: CLINIC | Age: 85
End: 2025-03-31
Payer: MEDICARE

## 2025-03-31 VITALS
OXYGEN SATURATION: 100 % | SYSTOLIC BLOOD PRESSURE: 148 MMHG | HEART RATE: 76 BPM | BODY MASS INDEX: 24.93 KG/M2 | WEIGHT: 149.6 LBS | DIASTOLIC BLOOD PRESSURE: 74 MMHG | HEIGHT: 65 IN | TEMPERATURE: 96 F | RESPIRATION RATE: 16 BRPM

## 2025-03-31 DIAGNOSIS — I10 ESSENTIAL HYPERTENSION, BENIGN: ICD-10-CM

## 2025-03-31 DIAGNOSIS — R42 DIZZINESS: Primary | ICD-10-CM

## 2025-03-31 DIAGNOSIS — I95.9 HYPOTENSION, UNSPECIFIED HYPOTENSION TYPE: ICD-10-CM

## 2025-03-31 DIAGNOSIS — R55 SYNCOPE, UNSPECIFIED SYNCOPE TYPE: ICD-10-CM

## 2025-03-31 LAB
ANION GAP SERPL CALCULATED.3IONS-SCNC: 12 MMOL/L (ref 7–15)
BUN SERPL-MCNC: 15.1 MG/DL (ref 8–23)
CALCIUM SERPL-MCNC: 10.4 MG/DL (ref 8.8–10.4)
CHLORIDE SERPL-SCNC: 106 MMOL/L (ref 98–107)
CREAT SERPL-MCNC: 1.07 MG/DL (ref 0.51–0.95)
EGFRCR SERPLBLD CKD-EPI 2021: 52 ML/MIN/1.73M2
ERYTHROCYTE [DISTWIDTH] IN BLOOD BY AUTOMATED COUNT: 13.6 % (ref 10–15)
GLUCOSE SERPL-MCNC: 125 MG/DL (ref 70–99)
HCO3 SERPL-SCNC: 25 MMOL/L (ref 22–29)
HCT VFR BLD AUTO: 40.8 % (ref 35–47)
HGB BLD-MCNC: 13.3 G/DL (ref 11.7–15.7)
MCH RBC QN AUTO: 30.4 PG (ref 26.5–33)
MCHC RBC AUTO-ENTMCNC: 32.6 G/DL (ref 31.5–36.5)
MCV RBC AUTO: 93 FL (ref 78–100)
PLATELET # BLD AUTO: 317 10E3/UL (ref 150–450)
POTASSIUM SERPL-SCNC: 4.5 MMOL/L (ref 3.4–5.3)
RBC # BLD AUTO: 4.38 10E6/UL (ref 3.8–5.2)
SODIUM SERPL-SCNC: 143 MMOL/L (ref 135–145)
WBC # BLD AUTO: 5.3 10E3/UL (ref 4–11)

## 2025-03-31 PROCEDURE — 99214 OFFICE O/P EST MOD 30 MIN: CPT | Performed by: INTERNAL MEDICINE

## 2025-03-31 PROCEDURE — 1126F AMNT PAIN NOTED NONE PRSNT: CPT | Performed by: INTERNAL MEDICINE

## 2025-03-31 PROCEDURE — 3077F SYST BP >= 140 MM HG: CPT | Performed by: INTERNAL MEDICINE

## 2025-03-31 PROCEDURE — 80048 BASIC METABOLIC PNL TOTAL CA: CPT | Performed by: INTERNAL MEDICINE

## 2025-03-31 PROCEDURE — 36415 COLL VENOUS BLD VENIPUNCTURE: CPT | Performed by: INTERNAL MEDICINE

## 2025-03-31 PROCEDURE — 3078F DIAST BP <80 MM HG: CPT | Performed by: INTERNAL MEDICINE

## 2025-03-31 PROCEDURE — 85027 COMPLETE CBC AUTOMATED: CPT | Performed by: INTERNAL MEDICINE

## 2025-03-31 PROCEDURE — G2211 COMPLEX E/M VISIT ADD ON: HCPCS | Performed by: INTERNAL MEDICINE

## 2025-03-31 ASSESSMENT — PAIN SCALES - GENERAL: PAINLEVEL_OUTOF10: NO PAIN (0)

## 2025-04-01 NOTE — RESULT ENCOUNTER NOTE
Your labs look very good.  This includes your complete blood count as well as chemistries.  Please let me know if you have questions.    It was nice to see you.  Your should be able to view the lab results.    Julio César Peres M.D.

## 2025-04-15 ENCOUNTER — HOSPITAL ENCOUNTER (OUTPATIENT)
Dept: CARDIOLOGY | Facility: CLINIC | Age: 85
Discharge: HOME | End: 2025-04-15
Attending: INTERNAL MEDICINE
Payer: MEDICARE

## 2025-04-15 DIAGNOSIS — R55 SYNCOPE, UNSPECIFIED SYNCOPE TYPE: ICD-10-CM

## 2025-04-15 DIAGNOSIS — I10 ESSENTIAL HYPERTENSION, BENIGN: ICD-10-CM

## 2025-04-15 DIAGNOSIS — R42 DIZZINESS: ICD-10-CM

## 2025-04-15 PROCEDURE — C8928 TTE W OR W/O FOL W/CON,STRES: HCPCS

## 2025-04-15 PROCEDURE — 255N000002 HC RX 255 OP 636: Performed by: INTERNAL MEDICINE

## 2025-04-15 PROCEDURE — 93325 DOPPLER ECHO COLOR FLOW MAPG: CPT | Mod: TC

## 2025-04-15 RX ADMIN — HUMAN ALBUMIN MICROSPHERES AND PERFLUTREN 9 ML: 10; .22 INJECTION, SOLUTION INTRAVENOUS at 10:16

## 2025-04-15 NOTE — RESULT ENCOUNTER NOTE
I am very happy to report that your stress test is normal indicating no signs of any significant blockages.  Please let me know if you are having ongoing symptoms.    Julio César Peres M.D.

## 2025-07-30 DIAGNOSIS — I10 ESSENTIAL HYPERTENSION, BENIGN: ICD-10-CM

## 2025-07-30 RX ORDER — AMLODIPINE BESYLATE 10 MG/1
10 TABLET ORAL DAILY
Qty: 90 TABLET | Refills: 0 | Status: SHIPPED | OUTPATIENT
Start: 2025-07-30

## (undated) RX ORDER — FENTANYL CITRATE 50 UG/ML
INJECTION, SOLUTION INTRAMUSCULAR; INTRAVENOUS
Status: DISPENSED
Start: 2018-08-23